# Patient Record
Sex: FEMALE | Race: WHITE | Employment: OTHER | URBAN - NONMETROPOLITAN AREA
[De-identification: names, ages, dates, MRNs, and addresses within clinical notes are randomized per-mention and may not be internally consistent; named-entity substitution may affect disease eponyms.]

---

## 2017-10-02 ENCOUNTER — DOCTOR'S OFFICE (OUTPATIENT)
Dept: URBAN - NONMETROPOLITAN AREA CLINIC 1 | Facility: CLINIC | Age: 77
Setting detail: OPHTHALMOLOGY
End: 2017-10-02

## 2017-10-02 ENCOUNTER — DOCTOR'S OFFICE (OUTPATIENT)
Dept: URBAN - NONMETROPOLITAN AREA CLINIC 1 | Facility: CLINIC | Age: 77
Setting detail: OPHTHALMOLOGY
End: 2017-10-02
Payer: COMMERCIAL

## 2017-10-02 ENCOUNTER — RX ONLY (RX ONLY)
Age: 77
End: 2017-10-02

## 2017-10-02 DIAGNOSIS — H52.4: ICD-10-CM

## 2017-10-02 DIAGNOSIS — H35.3221: ICD-10-CM

## 2017-10-02 DIAGNOSIS — H35.3112: ICD-10-CM

## 2017-10-02 DIAGNOSIS — H25.13: ICD-10-CM

## 2017-10-02 PROCEDURE — 99205 OFFICE O/P NEW HI 60 MIN: CPT | Performed by: OPHTHALMOLOGY

## 2017-10-02 PROCEDURE — VITAEYE VITA EYE VITAMINS: Performed by: OPHTHALMOLOGY

## 2017-10-02 PROCEDURE — 92134 CPTRZ OPH DX IMG PST SGM RTA: CPT | Performed by: OPHTHALMOLOGY

## 2017-10-02 ASSESSMENT — SPHEQUIV_DERIVED
OD_SPHEQUIV: 2.5
OS_SPHEQUIV: 0.5

## 2017-10-02 ASSESSMENT — REFRACTION_CURRENTRX
OD_AXIS: 146
OS_AXIS: 052
OD_OVR_VA: 20/
OD_VPRISM_DIRECTION: PROGS
OS_OVR_VA: 20/
OS_ADD: +2.50
OS_OVR_VA: 20/
OD_CYLINDER: -2.00
OD_OVR_VA: 20/
OS_VPRISM_DIRECTION: PROGS
OS_SPHERE: +2.00
OD_SPHERE: +2.25
OS_OVR_VA: 20/
OD_ADD: +2.50
OD_OVR_VA: 20/
OS_CYLINDER: -1.75

## 2017-10-02 ASSESSMENT — REFRACTION_AUTOREFRACTION
OD_AXIS: 118
OS_CYLINDER: -1.50
OD_CYLINDER: -2.50
OS_AXIS: 175
OS_SPHERE: +1.25
OD_SPHERE: +3.75

## 2017-10-02 ASSESSMENT — REFRACTION_MANIFEST
OS_VA1: 20/
OS_VA1: 20/
OU_VA: 20/
OD_VA3: 20/
OD_VA2: 20/
OU_VA: 20/
OD_VA2: 20/
OS_VA3: 20/
OS_VA2: 20/
OU_VA: 20/
OD_VA2: 20/
OD_VA1: 20/
OS_VA2: 20/
OS_VA3: 20/
OS_VA1: 20/
OS_VA3: 20/
OS_VA2: 20/
OD_VA3: 20/
OD_VA1: 20/
OD_VA1: 20/
OD_VA3: 20/

## 2017-10-02 ASSESSMENT — VISUAL ACUITY
OS_BCVA: 20/40+1
OD_BCVA: 20/70+1

## 2017-10-02 ASSESSMENT — CONFRONTATIONAL VISUAL FIELD TEST (CVF)
OD_FINDINGS: FULL
OS_FINDINGS: FULL

## 2017-10-09 ENCOUNTER — DOCTOR'S OFFICE (OUTPATIENT)
Dept: URBAN - NONMETROPOLITAN AREA CLINIC 1 | Facility: CLINIC | Age: 77
Setting detail: OPHTHALMOLOGY
End: 2017-10-09
Payer: COMMERCIAL

## 2017-10-09 DIAGNOSIS — H35.3232: ICD-10-CM

## 2017-10-09 DIAGNOSIS — H25.13: ICD-10-CM

## 2017-10-09 PROCEDURE — 92235 FLUORESCEIN ANGRPH MLTIFRAME: CPT | Performed by: OPHTHALMOLOGY

## 2017-10-09 PROCEDURE — 92250 FUNDUS PHOTOGRAPHY W/I&R: CPT | Performed by: OPHTHALMOLOGY

## 2017-10-09 PROCEDURE — 92014 COMPRE OPH EXAM EST PT 1/>: CPT | Performed by: OPHTHALMOLOGY

## 2017-10-09 ASSESSMENT — SPHEQUIV_DERIVED
OD_SPHEQUIV: 2.5
OS_SPHEQUIV: 0.5

## 2017-10-09 ASSESSMENT — CONFRONTATIONAL VISUAL FIELD TEST (CVF)
OS_FINDINGS: FULL
OD_FINDINGS: FULL

## 2017-10-09 ASSESSMENT — REFRACTION_CURRENTRX
OD_OVR_VA: 20/
OD_SPHERE: +2.25
OD_OVR_VA: 20/
OS_OVR_VA: 20/
OS_VPRISM_DIRECTION: PROGS
OD_ADD: +2.50
OD_OVR_VA: 20/
OS_CYLINDER: -1.75
OS_AXIS: 052
OS_OVR_VA: 20/
OS_SPHERE: +2.00
OD_VPRISM_DIRECTION: PROGS
OD_AXIS: 146
OD_CYLINDER: -2.00
OS_ADD: +2.50
OS_OVR_VA: 20/

## 2017-10-09 ASSESSMENT — REFRACTION_MANIFEST
OD_VA1: 20/
OD_VA2: 20/
OS_VA2: 20/
OS_VA3: 20/
OS_VA3: 20/
OU_VA: 20/
OD_VA2: 20/
OS_VA1: 20/
OS_VA1: 20/
OD_VA3: 20/
OU_VA: 20/
OS_VA2: 20/
OS_VA3: 20/
OD_VA1: 20/
OU_VA: 20/
OD_VA3: 20/
OD_VA3: 20/
OD_VA1: 20/
OS_VA2: 20/
OD_VA2: 20/
OS_VA1: 20/

## 2017-10-09 ASSESSMENT — VISUAL ACUITY
OS_BCVA: 20/20-1
OD_BCVA: 20/50+2

## 2017-10-09 ASSESSMENT — REFRACTION_AUTOREFRACTION
OS_CYLINDER: -1.50
OD_SPHERE: +3.75
OS_AXIS: 175
OD_AXIS: 118
OS_SPHERE: +1.25
OD_CYLINDER: -2.50

## 2017-11-17 ENCOUNTER — DOCTOR'S OFFICE (OUTPATIENT)
Dept: URBAN - NONMETROPOLITAN AREA CLINIC 1 | Facility: CLINIC | Age: 77
Setting detail: OPHTHALMOLOGY
End: 2017-11-17
Payer: COMMERCIAL

## 2017-11-17 DIAGNOSIS — H35.3112: ICD-10-CM

## 2017-11-17 DIAGNOSIS — H35.3222: ICD-10-CM

## 2017-11-17 DIAGNOSIS — H25.13: ICD-10-CM

## 2017-11-17 DIAGNOSIS — H43.813: ICD-10-CM

## 2017-11-17 DIAGNOSIS — H43.811: ICD-10-CM

## 2017-11-17 DIAGNOSIS — H43.812: ICD-10-CM

## 2017-11-17 PROCEDURE — 99214 OFFICE O/P EST MOD 30 MIN: CPT | Performed by: OPHTHALMOLOGY

## 2017-11-17 PROCEDURE — 92225 OPHTHALMOSCOPY EXTENDED INITIAL: CPT | Performed by: OPHTHALMOLOGY

## 2017-11-17 PROCEDURE — 92134 CPTRZ OPH DX IMG PST SGM RTA: CPT | Performed by: OPHTHALMOLOGY

## 2017-11-17 ASSESSMENT — REFRACTION_CURRENTRX
OS_OVR_VA: 20/
OD_VPRISM_DIRECTION: PROGS
OD_CYLINDER: -2.00
OS_CYLINDER: -1.75
OD_AXIS: 146
OS_VPRISM_DIRECTION: PROGS
OS_AXIS: 052
OS_OVR_VA: 20/
OS_OVR_VA: 20/
OS_SPHERE: +2.00
OD_OVR_VA: 20/
OD_OVR_VA: 20/
OD_SPHERE: +2.25
OD_OVR_VA: 20/
OS_ADD: +2.50
OD_ADD: +2.50

## 2017-11-17 ASSESSMENT — REFRACTION_MANIFEST
OD_VA1: 20/
OU_VA: 20/
OD_VA1: 20/
OD_VA3: 20/
OS_VA1: 20/
OD_VA3: 20/
OS_VA2: 20/
OS_VA3: 20/
OD_VA1: 20/
OS_VA1: 20/
OS_VA1: 20/
OS_VA3: 20/
OS_VA3: 20/
OD_VA2: 20/
OS_VA2: 20/
OD_VA3: 20/
OS_VA2: 20/
OD_VA2: 20/
OD_VA2: 20/

## 2017-11-17 ASSESSMENT — CONFRONTATIONAL VISUAL FIELD TEST (CVF)
OD_FINDINGS: FULL
OS_FINDINGS: FULL

## 2017-11-17 ASSESSMENT — REFRACTION_AUTOREFRACTION
OS_AXIS: 175
OS_CYLINDER: -1.50
OD_AXIS: 118
OS_SPHERE: +1.25
OD_CYLINDER: -2.50
OD_SPHERE: +3.75

## 2017-11-17 ASSESSMENT — VISUAL ACUITY
OS_BCVA: 20/30+2
OD_BCVA: 20/40

## 2017-11-17 ASSESSMENT — SPHEQUIV_DERIVED
OS_SPHEQUIV: 0.5
OD_SPHEQUIV: 2.5

## 2018-01-19 ENCOUNTER — DOCTOR'S OFFICE (OUTPATIENT)
Dept: URBAN - NONMETROPOLITAN AREA CLINIC 1 | Facility: CLINIC | Age: 78
Setting detail: OPHTHALMOLOGY
End: 2018-01-19

## 2018-01-19 DIAGNOSIS — H52.7: ICD-10-CM

## 2018-01-19 PROCEDURE — VITAEYE VITA EYE VITAMINS: Performed by: OPHTHALMOLOGY

## 2018-02-19 ENCOUNTER — DOCTOR'S OFFICE (OUTPATIENT)
Dept: URBAN - NONMETROPOLITAN AREA CLINIC 1 | Facility: CLINIC | Age: 78
Setting detail: OPHTHALMOLOGY
End: 2018-02-19
Payer: COMMERCIAL

## 2018-02-19 DIAGNOSIS — H35.3232: ICD-10-CM

## 2018-02-19 DIAGNOSIS — H04.122: ICD-10-CM

## 2018-02-19 DIAGNOSIS — H04.121: ICD-10-CM

## 2018-02-19 DIAGNOSIS — H04.123: ICD-10-CM

## 2018-02-19 DIAGNOSIS — H43.813: ICD-10-CM

## 2018-02-19 DIAGNOSIS — H43.812: ICD-10-CM

## 2018-02-19 DIAGNOSIS — H25.13: ICD-10-CM

## 2018-02-19 DIAGNOSIS — H43.811: ICD-10-CM

## 2018-02-19 PROCEDURE — 83861 MICROFLUID ANALY TEARS: CPT | Performed by: OPHTHALMOLOGY

## 2018-02-19 PROCEDURE — 92014 COMPRE OPH EXAM EST PT 1/>: CPT | Performed by: OPHTHALMOLOGY

## 2018-02-19 PROCEDURE — 92134 CPTRZ OPH DX IMG PST SGM RTA: CPT | Performed by: OPHTHALMOLOGY

## 2018-02-19 PROCEDURE — 92226 OPHTHALMOSCOPY EXT SUBSEQUENT: CPT | Performed by: OPHTHALMOLOGY

## 2018-02-19 ASSESSMENT — VISUAL ACUITY
OD_BCVA: 20/50+2
OS_BCVA: 20/30+2

## 2018-02-19 ASSESSMENT — REFRACTION_CURRENTRX
OD_OVR_VA: 20/
OD_SPHERE: +2.25
OS_OVR_VA: 20/
OS_OVR_VA: 20/
OD_ADD: +2.50
OD_AXIS: 146
OD_OVR_VA: 20/
OD_VPRISM_DIRECTION: PROGS
OS_AXIS: 052
OS_CYLINDER: -1.75
OD_OVR_VA: 20/
OS_SPHERE: +2.00
OS_ADD: +2.50
OD_CYLINDER: -2.00
OS_VPRISM_DIRECTION: PROGS
OS_OVR_VA: 20/

## 2018-02-19 ASSESSMENT — REFRACTION_AUTOREFRACTION
OD_CYLINDER: -2.50
OS_SPHERE: +1.25
OS_AXIS: 175
OD_SPHERE: +3.75
OD_AXIS: 118
OS_CYLINDER: -1.50

## 2018-02-19 ASSESSMENT — SPHEQUIV_DERIVED
OS_SPHEQUIV: 0.5
OD_SPHEQUIV: 2.5

## 2018-02-19 ASSESSMENT — REFRACTION_MANIFEST
OD_VA1: 20/
OU_VA: 20/
OS_VA1: 20/
OS_VA2: 20/
OS_VA1: 20/
OS_VA2: 20/
OU_VA: 20/
OD_VA3: 20/
OD_VA2: 20/
OS_VA2: 20/
OS_VA3: 20/
OU_VA: 20/
OS_VA3: 20/
OS_VA3: 20/
OD_VA1: 20/
OD_VA2: 20/
OS_VA1: 20/
OD_VA2: 20/
OD_VA1: 20/

## 2018-02-19 ASSESSMENT — CONFRONTATIONAL VISUAL FIELD TEST (CVF)
OS_FINDINGS: FULL
OD_FINDINGS: FULL

## 2018-04-23 ENCOUNTER — DOCTOR'S OFFICE (OUTPATIENT)
Dept: URBAN - NONMETROPOLITAN AREA CLINIC 1 | Facility: CLINIC | Age: 78
Setting detail: OPHTHALMOLOGY
End: 2018-04-23

## 2018-04-23 DIAGNOSIS — H52.7: ICD-10-CM

## 2018-04-23 PROCEDURE — VITAEYE VITA EYE VITAMINS: Performed by: OPHTHALMOLOGY

## 2018-06-21 ENCOUNTER — DOCTOR'S OFFICE (OUTPATIENT)
Dept: URBAN - NONMETROPOLITAN AREA CLINIC 1 | Facility: CLINIC | Age: 78
Setting detail: OPHTHALMOLOGY
End: 2018-06-21
Payer: COMMERCIAL

## 2018-06-21 DIAGNOSIS — H04.123: ICD-10-CM

## 2018-06-21 DIAGNOSIS — H35.3232: ICD-10-CM

## 2018-06-21 DIAGNOSIS — H04.121: ICD-10-CM

## 2018-06-21 DIAGNOSIS — H25.13: ICD-10-CM

## 2018-06-21 DIAGNOSIS — H43.812: ICD-10-CM

## 2018-06-21 DIAGNOSIS — H43.811: ICD-10-CM

## 2018-06-21 DIAGNOSIS — H04.122: ICD-10-CM

## 2018-06-21 DIAGNOSIS — H43.813: ICD-10-CM

## 2018-06-21 PROCEDURE — 92014 COMPRE OPH EXAM EST PT 1/>: CPT | Performed by: OPHTHALMOLOGY

## 2018-06-21 PROCEDURE — 83861 MICROFLUID ANALY TEARS: CPT | Performed by: OPHTHALMOLOGY

## 2018-06-21 PROCEDURE — 92226 OPHTHALMOSCOPY EXT SUBSEQUENT: CPT | Performed by: OPHTHALMOLOGY

## 2018-06-21 PROCEDURE — 92134 CPTRZ OPH DX IMG PST SGM RTA: CPT | Performed by: OPHTHALMOLOGY

## 2018-06-21 ASSESSMENT — REFRACTION_CURRENTRX
OS_ADD: +2.50
OS_AXIS: 052
OD_OVR_VA: 20/
OD_SPHERE: +2.25
OS_OVR_VA: 20/
OS_VPRISM_DIRECTION: PROGS
OS_CYLINDER: -1.75
OS_OVR_VA: 20/
OS_SPHERE: +2.00
OD_ADD: +2.50
OD_AXIS: 146
OD_OVR_VA: 20/
OD_OVR_VA: 20/
OS_OVR_VA: 20/
OD_VPRISM_DIRECTION: PROGS
OD_CYLINDER: -2.00

## 2018-06-21 ASSESSMENT — REFRACTION_MANIFEST
OU_VA: 20/
OD_VA3: 20/
OS_VA3: 20/
OU_VA: 20/
OD_VA2: 20/
OD_VA3: 20/
OD_VA1: 20/
OD_VA1: 20/
OS_VA1: 20/
OS_VA2: 20/
OS_VA1: 20/
OD_VA2: 20/
OD_VA1: 20/
OS_VA3: 20/
OS_VA2: 20/
OU_VA: 20/
OS_VA1: 20/
OS_VA3: 20/
OD_VA3: 20/
OD_VA2: 20/
OS_VA2: 20/

## 2018-06-21 ASSESSMENT — CONFRONTATIONAL VISUAL FIELD TEST (CVF)
OS_FINDINGS: FULL
OD_FINDINGS: FULL

## 2018-06-21 ASSESSMENT — REFRACTION_AUTOREFRACTION
OD_CYLINDER: -2.50
OS_AXIS: 175
OS_CYLINDER: -1.50
OD_SPHERE: +3.75
OD_AXIS: 118
OS_SPHERE: +1.25

## 2018-06-21 ASSESSMENT — SPHEQUIV_DERIVED
OD_SPHEQUIV: 2.5
OS_SPHEQUIV: 0.5

## 2018-06-21 ASSESSMENT — VISUAL ACUITY
OS_BCVA: 20/25-1
OD_BCVA: 20/50-2

## 2018-08-17 ENCOUNTER — DOCTOR'S OFFICE (OUTPATIENT)
Dept: URBAN - NONMETROPOLITAN AREA CLINIC 1 | Facility: CLINIC | Age: 78
Setting detail: OPHTHALMOLOGY
End: 2018-08-17
Payer: COMMERCIAL

## 2018-08-17 DIAGNOSIS — H04.121: ICD-10-CM

## 2018-08-17 DIAGNOSIS — H04.122: ICD-10-CM

## 2018-08-17 DIAGNOSIS — H35.3222: ICD-10-CM

## 2018-08-17 DIAGNOSIS — H35.3112: ICD-10-CM

## 2018-08-17 PROCEDURE — 83861 MICROFLUID ANALY TEARS: CPT | Performed by: OPHTHALMOLOGY

## 2018-08-17 PROCEDURE — 92014 COMPRE OPH EXAM EST PT 1/>: CPT | Performed by: OPHTHALMOLOGY

## 2018-08-17 PROCEDURE — 92235 FLUORESCEIN ANGRPH MLTIFRAME: CPT | Performed by: OPHTHALMOLOGY

## 2018-08-17 PROCEDURE — 92250 FUNDUS PHOTOGRAPHY W/I&R: CPT | Performed by: OPHTHALMOLOGY

## 2018-08-17 ASSESSMENT — CONFRONTATIONAL VISUAL FIELD TEST (CVF)
OD_FINDINGS: FULL
OS_FINDINGS: FULL

## 2018-08-17 ASSESSMENT — REFRACTION_MANIFEST
OD_VA3: 20/
OD_VA3: 20/
OD_VA2: 20/
OS_VA2: 20/
OU_VA: 20/
OS_VA3: 20/
OU_VA: 20/
OS_VA2: 20/
OD_VA1: 20/
OD_VA1: 20/
OD_VA3: 20/
OS_VA3: 20/
OS_VA3: 20/
OD_VA2: 20/
OS_VA1: 20/
OS_VA1: 20/
OS_VA2: 20/
OU_VA: 20/
OD_VA2: 20/
OD_VA1: 20/
OS_VA1: 20/

## 2018-08-17 ASSESSMENT — REFRACTION_CURRENTRX
OD_VPRISM_DIRECTION: PROGS
OS_CYLINDER: -1.75
OS_VPRISM_DIRECTION: PROGS
OS_AXIS: 052
OS_OVR_VA: 20/
OS_OVR_VA: 20/
OD_OVR_VA: 20/
OD_SPHERE: +2.25
OD_CYLINDER: -2.00
OD_AXIS: 146
OD_ADD: +2.50
OS_OVR_VA: 20/
OS_SPHERE: +2.00
OD_OVR_VA: 20/
OD_OVR_VA: 20/
OS_ADD: +2.50

## 2018-08-17 ASSESSMENT — REFRACTION_AUTOREFRACTION
OS_CYLINDER: -1.50
OD_SPHERE: +3.75
OS_AXIS: 175
OS_SPHERE: +1.25
OD_AXIS: 118
OD_CYLINDER: -2.50

## 2018-08-17 ASSESSMENT — SPHEQUIV_DERIVED
OS_SPHEQUIV: 0.5
OD_SPHEQUIV: 2.5

## 2018-08-17 ASSESSMENT — VISUAL ACUITY
OS_BCVA: 20/25-1
OD_BCVA: 20/30-2

## 2018-09-21 ENCOUNTER — DOCTOR'S OFFICE (OUTPATIENT)
Dept: URBAN - NONMETROPOLITAN AREA CLINIC 1 | Facility: CLINIC | Age: 78
Setting detail: OPHTHALMOLOGY
End: 2018-09-21
Payer: COMMERCIAL

## 2018-09-21 ENCOUNTER — RX ONLY (RX ONLY)
Age: 78
End: 2018-09-21

## 2018-09-21 DIAGNOSIS — H04.122: ICD-10-CM

## 2018-09-21 DIAGNOSIS — H35.3232: ICD-10-CM

## 2018-09-21 DIAGNOSIS — H04.123: ICD-10-CM

## 2018-09-21 DIAGNOSIS — H53.123: ICD-10-CM

## 2018-09-21 DIAGNOSIS — H43.811: ICD-10-CM

## 2018-09-21 DIAGNOSIS — H04.121: ICD-10-CM

## 2018-09-21 DIAGNOSIS — H43.813: ICD-10-CM

## 2018-09-21 PROCEDURE — 92226 OPHTHALMOSCOPY EXT SUBSEQUENT: CPT | Performed by: OPHTHALMOLOGY

## 2018-09-21 PROCEDURE — 83861 MICROFLUID ANALY TEARS: CPT | Performed by: OPHTHALMOLOGY

## 2018-09-21 PROCEDURE — 92134 CPTRZ OPH DX IMG PST SGM RTA: CPT | Performed by: OPHTHALMOLOGY

## 2018-09-21 PROCEDURE — 92014 COMPRE OPH EXAM EST PT 1/>: CPT | Performed by: OPHTHALMOLOGY

## 2018-09-21 ASSESSMENT — REFRACTION_CURRENTRX
OS_OVR_VA: 20/
OD_OVR_VA: 20/
OS_CYLINDER: -1.75
OS_VPRISM_DIRECTION: PROGS
OD_OVR_VA: 20/
OD_OVR_VA: 20/
OD_AXIS: 146
OS_OVR_VA: 20/
OD_SPHERE: +2.25
OS_SPHERE: +2.00
OD_VPRISM_DIRECTION: PROGS
OS_OVR_VA: 20/
OS_AXIS: 052
OD_ADD: +2.50
OD_CYLINDER: -2.00
OS_ADD: +2.50

## 2018-09-21 ASSESSMENT — REFRACTION_MANIFEST
OD_VA1: 20/
OD_VA1: 20/
OS_VA2: 20/
OD_VA3: 20/
OS_VA3: 20/
OD_VA3: 20/
OD_VA2: 20/
OS_VA3: 20/
OS_VA1: 20/
OS_VA1: 20/
OS_VA2: 20/
OD_VA2: 20/
OU_VA: 20/
OU_VA: 20/

## 2018-09-21 ASSESSMENT — VISUAL ACUITY
OS_BCVA: 20/30-2
OD_BCVA: 20/40-1

## 2018-09-21 ASSESSMENT — REFRACTION_AUTOREFRACTION
OS_AXIS: 175
OS_CYLINDER: -1.50
OD_CYLINDER: -2.50
OD_SPHERE: +3.75
OD_AXIS: 118
OS_SPHERE: +1.25

## 2018-09-21 ASSESSMENT — SPHEQUIV_DERIVED
OD_SPHEQUIV: 2.5
OS_SPHEQUIV: 0.5

## 2018-09-21 ASSESSMENT — CONFRONTATIONAL VISUAL FIELD TEST (CVF)
OD_FINDINGS: FULL
OS_FINDINGS: FULL

## 2018-10-22 ENCOUNTER — DOCTOR'S OFFICE (OUTPATIENT)
Dept: URBAN - NONMETROPOLITAN AREA CLINIC 1 | Facility: CLINIC | Age: 78
Setting detail: OPHTHALMOLOGY
End: 2018-10-22
Payer: COMMERCIAL

## 2018-10-22 ENCOUNTER — RX ONLY (RX ONLY)
Age: 78
End: 2018-10-22

## 2018-10-22 DIAGNOSIS — H35.3232: ICD-10-CM

## 2018-10-22 DIAGNOSIS — H04.122: ICD-10-CM

## 2018-10-22 DIAGNOSIS — H04.123: ICD-10-CM

## 2018-10-22 DIAGNOSIS — H04.121: ICD-10-CM

## 2018-10-22 DIAGNOSIS — H43.813: ICD-10-CM

## 2018-10-22 PROCEDURE — 92014 COMPRE OPH EXAM EST PT 1/>: CPT | Performed by: OPHTHALMOLOGY

## 2018-10-22 PROCEDURE — 92134 CPTRZ OPH DX IMG PST SGM RTA: CPT | Performed by: OPHTHALMOLOGY

## 2018-10-22 PROCEDURE — 83861 MICROFLUID ANALY TEARS: CPT | Performed by: OPHTHALMOLOGY

## 2018-10-22 ASSESSMENT — REFRACTION_MANIFEST
OU_VA: 20/
OD_VA3: 20/
OD_VA1: 20/
OS_VA2: 20/
OS_VA3: 20/
OS_VA1: 20/
OU_VA: 20/
OD_VA2: 20/
OS_VA3: 20/
OS_VA2: 20/
OD_VA2: 20/
OS_VA1: 20/
OD_VA1: 20/
OD_VA3: 20/

## 2018-10-22 ASSESSMENT — REFRACTION_AUTOREFRACTION
OS_SPHERE: +1.25
OD_AXIS: 118
OS_CYLINDER: -1.50
OD_CYLINDER: -2.50
OD_SPHERE: +3.75
OS_AXIS: 175

## 2018-10-22 ASSESSMENT — REFRACTION_CURRENTRX
OD_OVR_VA: 20/
OD_ADD: +2.50
OS_OVR_VA: 20/
OD_OVR_VA: 20/
OS_OVR_VA: 20/
OS_VPRISM_DIRECTION: PROGS
OD_AXIS: 146
OS_AXIS: 052
OD_VPRISM_DIRECTION: PROGS
OD_OVR_VA: 20/
OD_CYLINDER: -2.00
OS_SPHERE: +2.00
OS_CYLINDER: -1.75
OS_OVR_VA: 20/
OS_ADD: +2.50
OD_SPHERE: +2.25

## 2018-10-22 ASSESSMENT — SPHEQUIV_DERIVED
OS_SPHEQUIV: 0.5
OD_SPHEQUIV: 2.5

## 2018-10-22 ASSESSMENT — VISUAL ACUITY
OD_BCVA: 20/50-2
OS_BCVA: 20/25-1

## 2018-10-22 ASSESSMENT — CONFRONTATIONAL VISUAL FIELD TEST (CVF)
OS_FINDINGS: FULL
OD_FINDINGS: FULL

## 2018-11-07 ENCOUNTER — DOCTOR'S OFFICE (OUTPATIENT)
Dept: URBAN - NONMETROPOLITAN AREA CLINIC 1 | Facility: CLINIC | Age: 78
Setting detail: OPHTHALMOLOGY
End: 2018-11-07
Payer: COMMERCIAL

## 2018-11-07 DIAGNOSIS — H04.122: ICD-10-CM

## 2018-11-07 DIAGNOSIS — H04.121: ICD-10-CM

## 2018-11-07 PROCEDURE — 68761 CLOSE TEAR DUCT OPENING: CPT | Performed by: PHYSICIAN ASSISTANT

## 2018-11-07 ASSESSMENT — PUNCTA - ASSESSMENT
OD_PUNCTA: COL PLUG RLL RUL MED
OS_PUNCTA: COL PLUG LLL LUL MED

## 2018-11-08 ASSESSMENT — REFRACTION_CURRENTRX
OD_AXIS: 146
OS_OVR_VA: 20/
OS_VPRISM_DIRECTION: PROGS
OD_ADD: +2.50
OS_OVR_VA: 20/
OD_VPRISM_DIRECTION: PROGS
OD_OVR_VA: 20/
OS_SPHERE: +2.00
OD_OVR_VA: 20/
OS_AXIS: 052
OD_CYLINDER: -2.00
OS_ADD: +2.50
OS_OVR_VA: 20/
OD_OVR_VA: 20/
OD_SPHERE: +2.25
OS_CYLINDER: -1.75

## 2018-11-08 ASSESSMENT — SPHEQUIV_DERIVED
OS_SPHEQUIV: 0.5
OD_SPHEQUIV: 2.5

## 2018-11-08 ASSESSMENT — REFRACTION_AUTOREFRACTION
OD_AXIS: 118
OD_SPHERE: +3.75
OS_AXIS: 175
OS_CYLINDER: -1.50
OS_SPHERE: +1.25
OD_CYLINDER: -2.50

## 2018-11-08 ASSESSMENT — REFRACTION_MANIFEST
OD_VA1: 20/
OS_VA3: 20/
OD_VA2: 20/
OD_VA3: 20/
OD_VA2: 20/
OU_VA: 20/
OD_VA3: 20/
OU_VA: 20/
OS_VA1: 20/
OS_VA1: 20/
OS_VA2: 20/
OS_VA3: 20/
OD_VA1: 20/
OS_VA2: 20/

## 2018-11-08 ASSESSMENT — VISUAL ACUITY
OS_BCVA: 20/30
OD_BCVA: 20/50-2

## 2018-11-26 ENCOUNTER — DOCTOR'S OFFICE (OUTPATIENT)
Dept: URBAN - NONMETROPOLITAN AREA CLINIC 1 | Facility: CLINIC | Age: 78
Setting detail: OPHTHALMOLOGY
End: 2018-11-26
Payer: COMMERCIAL

## 2018-11-26 DIAGNOSIS — H43.813: ICD-10-CM

## 2018-11-26 DIAGNOSIS — H04.121: ICD-10-CM

## 2018-11-26 DIAGNOSIS — H25.13: ICD-10-CM

## 2018-11-26 DIAGNOSIS — H04.122: ICD-10-CM

## 2018-11-26 DIAGNOSIS — H35.3232: ICD-10-CM

## 2018-11-26 DIAGNOSIS — H04.123: ICD-10-CM

## 2018-11-26 PROCEDURE — 92014 COMPRE OPH EXAM EST PT 1/>: CPT | Performed by: OPHTHALMOLOGY

## 2018-11-26 PROCEDURE — 83861 MICROFLUID ANALY TEARS: CPT | Performed by: OPHTHALMOLOGY

## 2018-11-26 PROCEDURE — 92134 CPTRZ OPH DX IMG PST SGM RTA: CPT | Performed by: OPHTHALMOLOGY

## 2018-11-26 ASSESSMENT — REFRACTION_MANIFEST
OU_VA: 20/
OD_VA2: 20/
OS_VA1: 20/
OD_VA3: 20/
OS_VA2: 20/
OS_VA2: 20/
OD_VA3: 20/
OD_VA1: 20/
OD_VA1: 20/
OS_VA1: 20/
OS_VA3: 20/
OS_VA3: 20/
OU_VA: 20/
OD_VA2: 20/

## 2018-11-26 ASSESSMENT — REFRACTION_CURRENTRX
OS_SPHERE: +2.00
OS_VPRISM_DIRECTION: PROGS
OD_SPHERE: +2.25
OD_OVR_VA: 20/
OD_OVR_VA: 20/
OS_AXIS: 052
OS_CYLINDER: -1.75
OS_ADD: +2.50
OD_OVR_VA: 20/
OD_VPRISM_DIRECTION: PROGS
OD_CYLINDER: -2.00
OS_OVR_VA: 20/
OD_AXIS: 146
OS_OVR_VA: 20/
OS_OVR_VA: 20/
OD_ADD: +2.50

## 2018-11-26 ASSESSMENT — CONFRONTATIONAL VISUAL FIELD TEST (CVF)
OS_FINDINGS: FULL
OD_FINDINGS: FULL

## 2018-11-26 ASSESSMENT — VISUAL ACUITY
OS_BCVA: 20/30
OD_BCVA: 20/50-2

## 2018-11-26 ASSESSMENT — REFRACTION_AUTOREFRACTION
OD_SPHERE: +3.75
OD_AXIS: 118
OD_CYLINDER: -2.50
OS_CYLINDER: -1.50
OS_SPHERE: +1.25
OS_AXIS: 175

## 2018-11-26 ASSESSMENT — SPHEQUIV_DERIVED
OD_SPHEQUIV: 2.5
OS_SPHEQUIV: 0.5

## 2018-11-26 ASSESSMENT — LID EXAM ASSESSMENTS
OD_BLEPHARITIS: 1+ 2+
OS_BLEPHARITIS: 1+ 2+

## 2018-11-26 ASSESSMENT — TEAR BREAK UP TIME (TBUT)
OD_TBUT: 1+ 2+
OS_TBUT: 1+ 2+

## 2018-12-03 ENCOUNTER — DOCTOR'S OFFICE (OUTPATIENT)
Dept: URBAN - NONMETROPOLITAN AREA CLINIC 1 | Facility: CLINIC | Age: 78
Setting detail: OPHTHALMOLOGY
End: 2018-12-03
Payer: COMMERCIAL

## 2018-12-03 DIAGNOSIS — H01.002: ICD-10-CM

## 2018-12-03 DIAGNOSIS — H04.123: ICD-10-CM

## 2018-12-03 DIAGNOSIS — H01.004: ICD-10-CM

## 2018-12-03 DIAGNOSIS — H01.001: ICD-10-CM

## 2018-12-03 PROCEDURE — 99214 OFFICE O/P EST MOD 30 MIN: CPT | Performed by: PHYSICIAN ASSISTANT

## 2018-12-03 ASSESSMENT — DRY EYES - PHYSICIAN NOTES
OD_GENERALCOMMENTS: INFERIOR
OS_GENERALCOMMENTS: VERY TRACE INFERIOR SPK

## 2018-12-03 ASSESSMENT — TEAR BREAK UP TIME (TBUT)
OS_TBUT: 1+ 2+
OD_TBUT: 1+ 2+

## 2018-12-03 ASSESSMENT — LID EXAM ASSESSMENTS
OS_BLEPHARITIS: LLL LUL T
OD_BLEPHARITIS: RLL RUL T

## 2018-12-03 ASSESSMENT — SUPERFICIAL PUNCTATE KERATITIS (SPK): OD_SPK: T

## 2018-12-04 ASSESSMENT — REFRACTION_CURRENTRX
OS_ADD: +2.50
OD_OVR_VA: 20/
OS_OVR_VA: 20/
OD_OVR_VA: 20/
OS_SPHERE: +2.00
OD_OVR_VA: 20/
OS_OVR_VA: 20/
OS_VPRISM_DIRECTION: PROGS
OS_CYLINDER: -1.75
OS_AXIS: 052
OD_ADD: +2.50
OD_VPRISM_DIRECTION: PROGS
OD_CYLINDER: -2.00
OD_SPHERE: +2.25
OS_OVR_VA: 20/
OD_AXIS: 146

## 2018-12-04 ASSESSMENT — VISUAL ACUITY
OS_BCVA: 20/30
OD_BCVA: 20/50-2

## 2018-12-04 ASSESSMENT — REFRACTION_MANIFEST
OD_VA1: 20/
OS_VA1: 20/
OS_VA3: 20/
OD_VA3: 20/
OD_VA2: 20/
OU_VA: 20/
OD_VA2: 20/
OS_VA1: 20/
OD_VA3: 20/
OS_VA2: 20/
OS_VA3: 20/
OS_VA2: 20/
OD_VA1: 20/
OU_VA: 20/

## 2018-12-04 ASSESSMENT — REFRACTION_AUTOREFRACTION
OS_AXIS: 175
OD_AXIS: 118
OS_CYLINDER: -1.50
OS_SPHERE: +1.25
OD_CYLINDER: -2.50
OD_SPHERE: +3.75

## 2018-12-04 ASSESSMENT — SPHEQUIV_DERIVED
OD_SPHEQUIV: 2.5
OS_SPHEQUIV: 0.5

## 2019-01-28 ENCOUNTER — DOCTOR'S OFFICE (OUTPATIENT)
Dept: URBAN - NONMETROPOLITAN AREA CLINIC 1 | Facility: CLINIC | Age: 79
Setting detail: OPHTHALMOLOGY
End: 2019-01-28
Payer: COMMERCIAL

## 2019-01-28 DIAGNOSIS — H35.3112: ICD-10-CM

## 2019-01-28 DIAGNOSIS — H43.813: ICD-10-CM

## 2019-01-28 DIAGNOSIS — H04.121: ICD-10-CM

## 2019-01-28 DIAGNOSIS — H04.123: ICD-10-CM

## 2019-01-28 DIAGNOSIS — H04.122: ICD-10-CM

## 2019-01-28 DIAGNOSIS — H35.3222: ICD-10-CM

## 2019-01-28 PROCEDURE — 92134 CPTRZ OPH DX IMG PST SGM RTA: CPT | Performed by: OPHTHALMOLOGY

## 2019-01-28 PROCEDURE — 83861 MICROFLUID ANALY TEARS: CPT | Performed by: OPHTHALMOLOGY

## 2019-01-28 PROCEDURE — 92014 COMPRE OPH EXAM EST PT 1/>: CPT | Performed by: OPHTHALMOLOGY

## 2019-01-28 ASSESSMENT — REFRACTION_AUTOREFRACTION
OD_AXIS: 118
OD_CYLINDER: -2.50
OD_SPHERE: +3.75
OS_AXIS: 175
OS_CYLINDER: -1.50
OS_SPHERE: +1.25

## 2019-01-28 ASSESSMENT — SPHEQUIV_DERIVED
OS_SPHEQUIV: 0.5
OD_SPHEQUIV: 2.5

## 2019-01-28 ASSESSMENT — REFRACTION_CURRENTRX
OD_OVR_VA: 20/
OS_VPRISM_DIRECTION: PROGS
OS_ADD: +2.50
OS_OVR_VA: 20/
OS_AXIS: 052
OD_AXIS: 146
OD_CYLINDER: -2.00
OS_OVR_VA: 20/
OS_CYLINDER: -1.75
OS_SPHERE: +2.00
OD_OVR_VA: 20/
OD_VPRISM_DIRECTION: PROGS
OD_ADD: +2.50
OD_SPHERE: +2.25
OD_OVR_VA: 20/
OS_OVR_VA: 20/

## 2019-01-28 ASSESSMENT — REFRACTION_MANIFEST
OD_VA3: 20/
OS_VA1: 20/
OD_VA2: 20/
OD_VA1: 20/
OD_VA3: 20/
OS_VA2: 20/
OU_VA: 20/
OS_VA2: 20/
OD_VA2: 20/
OS_VA1: 20/
OD_VA1: 20/
OU_VA: 20/
OS_VA3: 20/
OS_VA3: 20/

## 2019-01-28 ASSESSMENT — LID EXAM ASSESSMENTS
OD_BLEPHARITIS: RLL RUL T
OS_BLEPHARITIS: LLL LUL T

## 2019-01-28 ASSESSMENT — DRY EYES - PHYSICIAN NOTES
OS_GENERALCOMMENTS: VERY TRACE INFERIOR SPK
OD_GENERALCOMMENTS: INFERIOR

## 2019-01-28 ASSESSMENT — TEAR BREAK UP TIME (TBUT)
OD_TBUT: 1+ 2+
OS_TBUT: 1+ 2+

## 2019-01-28 ASSESSMENT — CONFRONTATIONAL VISUAL FIELD TEST (CVF)
OS_FINDINGS: FULL
OD_FINDINGS: FULL

## 2019-01-28 ASSESSMENT — VISUAL ACUITY
OD_BCVA: 20/50-2
OS_BCVA: 20/30+2

## 2019-01-28 ASSESSMENT — SUPERFICIAL PUNCTATE KERATITIS (SPK): OD_SPK: T

## 2019-03-11 ENCOUNTER — DOCTOR'S OFFICE (OUTPATIENT)
Dept: URBAN - NONMETROPOLITAN AREA CLINIC 1 | Facility: CLINIC | Age: 79
Setting detail: OPHTHALMOLOGY
End: 2019-03-11
Payer: COMMERCIAL

## 2019-03-11 DIAGNOSIS — H25.13: ICD-10-CM

## 2019-03-11 DIAGNOSIS — H43.811: ICD-10-CM

## 2019-03-11 DIAGNOSIS — H04.123: ICD-10-CM

## 2019-03-11 DIAGNOSIS — H43.813: ICD-10-CM

## 2019-03-11 DIAGNOSIS — H04.122: ICD-10-CM

## 2019-03-11 DIAGNOSIS — H35.3232: ICD-10-CM

## 2019-03-11 DIAGNOSIS — H04.121: ICD-10-CM

## 2019-03-11 PROCEDURE — 92226 OPHTHALMOSCOPY EXT SUBSEQUENT: CPT | Performed by: OPHTHALMOLOGY

## 2019-03-11 PROCEDURE — 83861 MICROFLUID ANALY TEARS: CPT | Performed by: OPHTHALMOLOGY

## 2019-03-11 PROCEDURE — 92235 FLUORESCEIN ANGRPH MLTIFRAME: CPT | Performed by: OPHTHALMOLOGY

## 2019-03-11 PROCEDURE — 92250 FUNDUS PHOTOGRAPHY W/I&R: CPT | Performed by: OPHTHALMOLOGY

## 2019-03-11 PROCEDURE — 92014 COMPRE OPH EXAM EST PT 1/>: CPT | Performed by: OPHTHALMOLOGY

## 2019-03-11 ASSESSMENT — REFRACTION_CURRENTRX
OD_VPRISM_DIRECTION: PROGS
OD_OVR_VA: 20/
OD_SPHERE: +2.25
OS_AXIS: 052
OD_OVR_VA: 20/
OD_OVR_VA: 20/
OD_ADD: +2.50
OS_VPRISM_DIRECTION: PROGS
OS_OVR_VA: 20/
OS_ADD: +2.50
OS_OVR_VA: 20/
OD_CYLINDER: -2.00
OS_CYLINDER: -1.75
OD_AXIS: 146
OS_OVR_VA: 20/
OS_SPHERE: +2.00

## 2019-03-11 ASSESSMENT — REFRACTION_MANIFEST
OD_VA3: 20/
OD_VA2: 20/
OS_VA3: 20/
OD_VA3: 20/
OS_VA2: 20/
OS_VA2: 20/
OS_VA3: 20/
OU_VA: 20/
OS_VA1: 20/
OD_VA2: 20/
OS_VA1: 20/
OD_VA1: 20/
OD_VA1: 20/
OU_VA: 20/

## 2019-03-11 ASSESSMENT — CONFRONTATIONAL VISUAL FIELD TEST (CVF)
OD_FINDINGS: FULL
OS_FINDINGS: FULL

## 2019-03-11 ASSESSMENT — REFRACTION_AUTOREFRACTION
OS_SPHERE: +1.25
OS_CYLINDER: -1.50
OD_SPHERE: +3.75
OS_AXIS: 175
OD_AXIS: 118
OD_CYLINDER: -2.50

## 2019-03-11 ASSESSMENT — DRY EYES - PHYSICIAN NOTES
OD_GENERALCOMMENTS: INFERIOR
OS_GENERALCOMMENTS: VERY TRACE INFERIOR SPK

## 2019-03-11 ASSESSMENT — SUPERFICIAL PUNCTATE KERATITIS (SPK): OD_SPK: T

## 2019-03-11 ASSESSMENT — SPHEQUIV_DERIVED
OD_SPHEQUIV: 2.5
OS_SPHEQUIV: 0.5

## 2019-03-11 ASSESSMENT — LID EXAM ASSESSMENTS
OS_BLEPHARITIS: LLL LUL T
OD_BLEPHARITIS: RLL RUL T

## 2019-03-11 ASSESSMENT — VISUAL ACUITY
OD_BCVA: 20/40-2
OS_BCVA: 20/30-1

## 2019-03-11 ASSESSMENT — TEAR BREAK UP TIME (TBUT)
OD_TBUT: 1+ 2+
OS_TBUT: 1+ 2+

## 2019-04-15 ENCOUNTER — DOCTOR'S OFFICE (OUTPATIENT)
Dept: URBAN - NONMETROPOLITAN AREA CLINIC 1 | Facility: CLINIC | Age: 79
Setting detail: OPHTHALMOLOGY
End: 2019-04-15
Payer: COMMERCIAL

## 2019-04-15 ENCOUNTER — RX ONLY (RX ONLY)
Age: 79
End: 2019-04-15

## 2019-04-15 DIAGNOSIS — H35.3222: ICD-10-CM

## 2019-04-15 DIAGNOSIS — H43.811: ICD-10-CM

## 2019-04-15 DIAGNOSIS — H35.3112: ICD-10-CM

## 2019-04-15 DIAGNOSIS — H43.812: ICD-10-CM

## 2019-04-15 DIAGNOSIS — H04.122: ICD-10-CM

## 2019-04-15 DIAGNOSIS — H43.813: ICD-10-CM

## 2019-04-15 DIAGNOSIS — H25.13: ICD-10-CM

## 2019-04-15 DIAGNOSIS — H04.121: ICD-10-CM

## 2019-04-15 PROCEDURE — 92226 OPHTHALMOSCOPY EXT SUBSEQUENT: CPT | Performed by: OPHTHALMOLOGY

## 2019-04-15 PROCEDURE — 92134 CPTRZ OPH DX IMG PST SGM RTA: CPT | Performed by: OPHTHALMOLOGY

## 2019-04-15 PROCEDURE — 83861 MICROFLUID ANALY TEARS: CPT | Performed by: OPHTHALMOLOGY

## 2019-04-15 PROCEDURE — 92014 COMPRE OPH EXAM EST PT 1/>: CPT | Performed by: OPHTHALMOLOGY

## 2019-04-15 ASSESSMENT — REFRACTION_CURRENTRX
OS_CYLINDER: -1.75
OS_OVR_VA: 20/
OS_ADD: +2.50
OD_OVR_VA: 20/
OD_SPHERE: +2.25
OD_AXIS: 146
OS_AXIS: 052
OD_ADD: +2.50
OS_OVR_VA: 20/
OS_SPHERE: +2.00
OD_CYLINDER: -2.00
OD_VPRISM_DIRECTION: PROGS
OS_OVR_VA: 20/
OS_VPRISM_DIRECTION: PROGS

## 2019-04-15 ASSESSMENT — REFRACTION_MANIFEST
OD_VA2: 20/
OS_VA3: 20/
OD_VA1: 20/
OS_VA3: 20/
OS_VA1: 20/
OD_VA3: 20/
OS_VA1: 20/
OD_VA2: 20/
OU_VA: 20/
OS_VA2: 20/
OD_VA1: 20/
OD_VA3: 20/
OU_VA: 20/
OS_VA2: 20/

## 2019-04-15 ASSESSMENT — SPHEQUIV_DERIVED
OD_SPHEQUIV: 2.5
OS_SPHEQUIV: 0.5

## 2019-04-15 ASSESSMENT — VISUAL ACUITY
OS_BCVA: 20/25-2
OD_BCVA: 20/40-2

## 2019-04-15 ASSESSMENT — REFRACTION_AUTOREFRACTION
OD_AXIS: 118
OS_AXIS: 175
OD_CYLINDER: -2.50
OS_CYLINDER: -1.50
OS_SPHERE: +1.25
OD_SPHERE: +3.75

## 2019-04-15 ASSESSMENT — SUPERFICIAL PUNCTATE KERATITIS (SPK): OD_SPK: T

## 2019-04-15 ASSESSMENT — LID EXAM ASSESSMENTS
OD_BLEPHARITIS: RLL RUL T
OS_BLEPHARITIS: LLL LUL T

## 2019-04-15 ASSESSMENT — TEAR BREAK UP TIME (TBUT)
OS_TBUT: 1+ 2+
OD_TBUT: 1+ 2+

## 2019-04-15 ASSESSMENT — CONFRONTATIONAL VISUAL FIELD TEST (CVF)
OD_FINDINGS: FULL
OS_FINDINGS: FULL

## 2019-04-15 ASSESSMENT — DRY EYES - PHYSICIAN NOTES
OD_GENERALCOMMENTS: INFERIOR
OS_GENERALCOMMENTS: VERY TRACE INFERIOR SPK

## 2019-06-07 ENCOUNTER — DOCTOR'S OFFICE (OUTPATIENT)
Dept: URBAN - NONMETROPOLITAN AREA CLINIC 1 | Facility: CLINIC | Age: 79
Setting detail: OPHTHALMOLOGY
End: 2019-06-07
Payer: COMMERCIAL

## 2019-06-07 DIAGNOSIS — H04.121: ICD-10-CM

## 2019-06-07 DIAGNOSIS — H04.123: ICD-10-CM

## 2019-06-07 DIAGNOSIS — H35.3222: ICD-10-CM

## 2019-06-07 DIAGNOSIS — H43.811: ICD-10-CM

## 2019-06-07 DIAGNOSIS — H35.3112: ICD-10-CM

## 2019-06-07 DIAGNOSIS — H43.812: ICD-10-CM

## 2019-06-07 DIAGNOSIS — H04.122: ICD-10-CM

## 2019-06-07 DIAGNOSIS — H43.813: ICD-10-CM

## 2019-06-07 PROCEDURE — 83861 MICROFLUID ANALY TEARS: CPT | Performed by: OPHTHALMOLOGY

## 2019-06-07 PROCEDURE — 92134 CPTRZ OPH DX IMG PST SGM RTA: CPT | Performed by: OPHTHALMOLOGY

## 2019-06-07 PROCEDURE — 92226 OPHTHALMOSCOPY EXT SUBSEQUENT: CPT | Performed by: OPHTHALMOLOGY

## 2019-06-07 PROCEDURE — 92014 COMPRE OPH EXAM EST PT 1/>: CPT | Performed by: OPHTHALMOLOGY

## 2019-06-07 ASSESSMENT — REFRACTION_CURRENTRX
OS_VPRISM_DIRECTION: PROGS
OS_OVR_VA: 20/
OS_OVR_VA: 20/
OD_VPRISM_DIRECTION: PROGS
OD_ADD: +2.50
OD_OVR_VA: 20/
OS_SPHERE: +2.00
OD_OVR_VA: 20/
OD_CYLINDER: -2.00
OS_CYLINDER: -1.75
OS_OVR_VA: 20/
OS_ADD: +2.50
OD_SPHERE: +2.25
OD_AXIS: 146
OD_OVR_VA: 20/
OS_AXIS: 052

## 2019-06-07 ASSESSMENT — REFRACTION_MANIFEST
OS_VA2: 20/
OU_VA: 20/
OD_VA1: 20/
OD_VA2: 20/
OD_VA2: 20/
OD_VA3: 20/
OD_VA1: 20/
OS_VA3: 20/
OU_VA: 20/
OD_VA3: 20/
OS_VA2: 20/
OS_VA3: 20/
OS_VA1: 20/
OS_VA1: 20/

## 2019-06-07 ASSESSMENT — VISUAL ACUITY
OS_BCVA: 20/30-2
OD_BCVA: 20/50-2

## 2019-06-07 ASSESSMENT — REFRACTION_AUTOREFRACTION
OD_SPHERE: +3.75
OD_CYLINDER: -2.50
OS_AXIS: 175
OS_CYLINDER: -1.50
OD_AXIS: 118
OS_SPHERE: +1.25

## 2019-06-07 ASSESSMENT — CONFRONTATIONAL VISUAL FIELD TEST (CVF)
OS_FINDINGS: FULL
OD_FINDINGS: FULL

## 2019-06-07 ASSESSMENT — SPHEQUIV_DERIVED
OS_SPHEQUIV: 0.5
OD_SPHEQUIV: 2.5

## 2019-06-07 ASSESSMENT — LID EXAM ASSESSMENTS
OD_BLEPHARITIS: RLL RUL T
OS_BLEPHARITIS: LLL LUL T

## 2019-06-07 ASSESSMENT — DRY EYES - PHYSICIAN NOTES
OD_GENERALCOMMENTS: INFERIOR
OS_GENERALCOMMENTS: VERY TRACE INFERIOR SPK

## 2019-06-07 ASSESSMENT — SUPERFICIAL PUNCTATE KERATITIS (SPK): OD_SPK: T

## 2019-06-07 ASSESSMENT — TEAR BREAK UP TIME (TBUT)
OS_TBUT: 1+ 2+
OD_TBUT: 1+ 2+

## 2019-06-12 ENCOUNTER — DOCTOR'S OFFICE (OUTPATIENT)
Dept: URBAN - NONMETROPOLITAN AREA CLINIC 1 | Facility: CLINIC | Age: 79
Setting detail: OPHTHALMOLOGY
End: 2019-06-12
Payer: COMMERCIAL

## 2019-06-12 DIAGNOSIS — H04.121: ICD-10-CM

## 2019-06-12 DIAGNOSIS — H04.122: ICD-10-CM

## 2019-06-12 PROCEDURE — 68761 CLOSE TEAR DUCT OPENING: CPT | Performed by: PHYSICIAN ASSISTANT

## 2019-06-13 ASSESSMENT — REFRACTION_CURRENTRX
OS_SPHERE: +2.00
OS_AXIS: 052
OD_SPHERE: +2.25
OD_OVR_VA: 20/
OS_VPRISM_DIRECTION: PROGS
OD_ADD: +2.50
OD_OVR_VA: 20/
OS_OVR_VA: 20/
OD_OVR_VA: 20/
OS_ADD: +2.50
OD_AXIS: 146
OS_OVR_VA: 20/
OS_CYLINDER: -1.75
OD_CYLINDER: -2.00
OD_VPRISM_DIRECTION: PROGS
OS_OVR_VA: 20/

## 2019-06-13 ASSESSMENT — REFRACTION_MANIFEST
OU_VA: 20/
OS_VA1: 20/
OD_VA2: 20/
OS_VA1: 20/
OS_VA3: 20/
OD_VA3: 20/
OD_VA1: 20/
OS_VA2: 20/
OD_VA1: 20/
OD_VA3: 20/
OU_VA: 20/
OD_VA2: 20/
OS_VA2: 20/
OS_VA3: 20/

## 2019-06-13 ASSESSMENT — SPHEQUIV_DERIVED
OS_SPHEQUIV: 0.5
OD_SPHEQUIV: 2.5

## 2019-06-13 ASSESSMENT — REFRACTION_AUTOREFRACTION
OS_SPHERE: +1.25
OD_CYLINDER: -2.50
OD_SPHERE: +3.75
OS_AXIS: 175
OS_CYLINDER: -1.50
OD_AXIS: 118

## 2019-06-13 ASSESSMENT — VISUAL ACUITY
OS_BCVA: 20/30-2
OD_BCVA: 20/50-2

## 2019-07-29 ENCOUNTER — DOCTOR'S OFFICE (OUTPATIENT)
Dept: URBAN - NONMETROPOLITAN AREA CLINIC 1 | Facility: CLINIC | Age: 79
Setting detail: OPHTHALMOLOGY
End: 2019-07-29
Payer: COMMERCIAL

## 2019-07-29 DIAGNOSIS — H04.121: ICD-10-CM

## 2019-07-29 DIAGNOSIS — H43.813: ICD-10-CM

## 2019-07-29 DIAGNOSIS — H35.3222: ICD-10-CM

## 2019-07-29 DIAGNOSIS — H35.3112: ICD-10-CM

## 2019-07-29 DIAGNOSIS — H04.122: ICD-10-CM

## 2019-07-29 DIAGNOSIS — H35.052: ICD-10-CM

## 2019-07-29 PROCEDURE — 92134 CPTRZ OPH DX IMG PST SGM RTA: CPT | Performed by: OPHTHALMOLOGY

## 2019-07-29 PROCEDURE — 92014 COMPRE OPH EXAM EST PT 1/>: CPT | Performed by: OPHTHALMOLOGY

## 2019-07-29 PROCEDURE — 83861 MICROFLUID ANALY TEARS: CPT | Performed by: OPHTHALMOLOGY

## 2019-07-29 ASSESSMENT — SUPERFICIAL PUNCTATE KERATITIS (SPK): OD_SPK: T

## 2019-07-29 ASSESSMENT — CONFRONTATIONAL VISUAL FIELD TEST (CVF)
OS_FINDINGS: FULL
OD_FINDINGS: FULL

## 2019-07-29 ASSESSMENT — REFRACTION_MANIFEST
OU_VA: 20/
OS_VA1: 20/
OD_VA1: 20/
OS_VA3: 20/
OD_VA1: 20/
OS_VA3: 20/
OD_VA2: 20/
OS_VA2: 20/
OS_VA1: 20/
OD_VA3: 20/
OD_VA2: 20/
OD_VA3: 20/
OU_VA: 20/
OS_VA2: 20/

## 2019-07-29 ASSESSMENT — DRY EYES - PHYSICIAN NOTES
OD_GENERALCOMMENTS: INFERIOR
OS_GENERALCOMMENTS: VERY TRACE INFERIOR SPK

## 2019-07-29 ASSESSMENT — REFRACTION_CURRENTRX
OS_OVR_VA: 20/
OD_OVR_VA: 20/
OS_AXIS: 052
OD_CYLINDER: -2.00
OS_SPHERE: +2.00
OS_OVR_VA: 20/
OD_OVR_VA: 20/
OD_SPHERE: +2.25
OD_AXIS: 146
OS_CYLINDER: -1.75
OS_ADD: +2.50
OS_OVR_VA: 20/
OD_OVR_VA: 20/
OS_VPRISM_DIRECTION: PROGS
OD_ADD: +2.50
OD_VPRISM_DIRECTION: PROGS

## 2019-07-29 ASSESSMENT — LID EXAM ASSESSMENTS
OD_BLEPHARITIS: RLL RUL T
OS_BLEPHARITIS: LLL LUL T

## 2019-07-29 ASSESSMENT — REFRACTION_AUTOREFRACTION
OS_SPHERE: +1.25
OD_SPHERE: +3.75
OS_CYLINDER: -1.50
OD_AXIS: 118
OD_CYLINDER: -2.50
OS_AXIS: 175

## 2019-07-29 ASSESSMENT — VISUAL ACUITY
OD_BCVA: 20/40-2
OS_BCVA: 20/30-2

## 2019-07-29 ASSESSMENT — SPHEQUIV_DERIVED
OS_SPHEQUIV: 0.5
OD_SPHEQUIV: 2.5

## 2019-07-29 ASSESSMENT — TEAR BREAK UP TIME (TBUT)
OS_TBUT: 1+ 2+
OD_TBUT: 1+ 2+

## 2019-08-02 ENCOUNTER — DOCTOR'S OFFICE (OUTPATIENT)
Dept: URBAN - NONMETROPOLITAN AREA CLINIC 1 | Facility: CLINIC | Age: 79
Setting detail: OPHTHALMOLOGY
End: 2019-08-02
Payer: COMMERCIAL

## 2019-08-02 DIAGNOSIS — H04.121: ICD-10-CM

## 2019-08-02 DIAGNOSIS — H04.122: ICD-10-CM

## 2019-08-02 PROCEDURE — 68761 CLOSE TEAR DUCT OPENING: CPT | Performed by: OPHTHALMOLOGY

## 2019-08-02 ASSESSMENT — REFRACTION_MANIFEST
OU_VA: 20/
OD_VA1: 20/
OS_VA3: 20/
OS_VA3: 20/
OS_VA1: 20/
OS_VA2: 20/
OD_VA3: 20/
OD_VA3: 20/
OS_VA1: 20/
OU_VA: 20/
OS_VA2: 20/
OD_VA2: 20/
OD_VA1: 20/
OD_VA2: 20/

## 2019-08-02 ASSESSMENT — SPHEQUIV_DERIVED
OS_SPHEQUIV: 0.5
OD_SPHEQUIV: 2.5

## 2019-08-02 ASSESSMENT — REFRACTION_CURRENTRX
OS_CYLINDER: -1.75
OS_OVR_VA: 20/
OS_AXIS: 052
OS_OVR_VA: 20/
OS_ADD: +2.50
OD_AXIS: 146
OS_OVR_VA: 20/
OD_OVR_VA: 20/
OD_ADD: +2.50
OS_SPHERE: +2.00
OD_SPHERE: +2.25
OD_OVR_VA: 20/
OD_OVR_VA: 20/
OD_VPRISM_DIRECTION: PROGS
OS_VPRISM_DIRECTION: PROGS
OD_CYLINDER: -2.00

## 2019-08-02 ASSESSMENT — REFRACTION_AUTOREFRACTION
OS_CYLINDER: -1.50
OD_SPHERE: +3.75
OS_SPHERE: +1.25
OD_AXIS: 118
OD_CYLINDER: -2.50
OS_AXIS: 175

## 2019-08-02 ASSESSMENT — VISUAL ACUITY
OD_BCVA: 20/40-2
OS_BCVA: 20/30-2

## 2019-09-12 ENCOUNTER — DOCTOR'S OFFICE (OUTPATIENT)
Dept: URBAN - NONMETROPOLITAN AREA CLINIC 1 | Facility: CLINIC | Age: 79
Setting detail: OPHTHALMOLOGY
End: 2019-09-12
Payer: COMMERCIAL

## 2019-09-12 DIAGNOSIS — H43.813: ICD-10-CM

## 2019-09-12 DIAGNOSIS — H04.121: ICD-10-CM

## 2019-09-12 DIAGNOSIS — H43.811: ICD-10-CM

## 2019-09-12 DIAGNOSIS — H35.3112: ICD-10-CM

## 2019-09-12 DIAGNOSIS — H43.812: ICD-10-CM

## 2019-09-12 DIAGNOSIS — H04.122: ICD-10-CM

## 2019-09-12 DIAGNOSIS — H25.13: ICD-10-CM

## 2019-09-12 DIAGNOSIS — H35.3222: ICD-10-CM

## 2019-09-12 PROCEDURE — 83861 MICROFLUID ANALY TEARS: CPT | Performed by: OPHTHALMOLOGY

## 2019-09-12 PROCEDURE — 92014 COMPRE OPH EXAM EST PT 1/>: CPT | Performed by: OPHTHALMOLOGY

## 2019-09-12 PROCEDURE — 92226 OPHTHALMOSCOPY EXT SUBSEQUENT: CPT | Performed by: OPHTHALMOLOGY

## 2019-09-12 PROCEDURE — 92134 CPTRZ OPH DX IMG PST SGM RTA: CPT | Performed by: OPHTHALMOLOGY

## 2019-09-12 ASSESSMENT — LID EXAM ASSESSMENTS
OS_BLEPHARITIS: LLL LUL T
OD_BLEPHARITIS: RLL RUL T

## 2019-09-12 ASSESSMENT — CONFRONTATIONAL VISUAL FIELD TEST (CVF)
OS_FINDINGS: FULL
OD_FINDINGS: FULL

## 2019-09-12 ASSESSMENT — REFRACTION_CURRENTRX
OS_VPRISM_DIRECTION: PROGS
OS_ADD: +2.50
OD_ADD: +2.50
OD_OVR_VA: 20/
OS_OVR_VA: 20/
OD_AXIS: 146
OS_CYLINDER: -1.75
OS_OVR_VA: 20/
OD_VPRISM_DIRECTION: PROGS
OD_OVR_VA: 20/
OD_SPHERE: +2.25
OD_OVR_VA: 20/
OS_SPHERE: +2.00
OS_AXIS: 052
OS_OVR_VA: 20/
OD_CYLINDER: -2.00

## 2019-09-12 ASSESSMENT — REFRACTION_AUTOREFRACTION
OS_CYLINDER: -1.50
OS_SPHERE: +1.25
OD_SPHERE: +3.75
OD_CYLINDER: -2.50
OS_AXIS: 175
OD_AXIS: 118

## 2019-09-12 ASSESSMENT — SPHEQUIV_DERIVED
OD_SPHEQUIV: 2.5
OS_SPHEQUIV: 0.5

## 2019-09-12 ASSESSMENT — REFRACTION_MANIFEST
OD_VA1: 20/
OD_VA1: 20/
OS_VA1: 20/
OD_VA2: 20/
OS_VA2: 20/
OS_VA3: 20/
OS_VA1: 20/
OS_VA3: 20/
OD_VA3: 20/
OS_VA2: 20/
OD_VA3: 20/
OU_VA: 20/
OU_VA: 20/
OD_VA2: 20/

## 2019-09-12 ASSESSMENT — DRY EYES - PHYSICIAN NOTES
OS_GENERALCOMMENTS: VERY TRACE INFERIOR SPK
OD_GENERALCOMMENTS: INFERIOR

## 2019-09-12 ASSESSMENT — TEAR BREAK UP TIME (TBUT)
OS_TBUT: 1+ 2+
OD_TBUT: 1+ 2+

## 2019-09-12 ASSESSMENT — VISUAL ACUITY
OS_BCVA: 20/40+2
OD_BCVA: 20/40-2

## 2019-09-12 ASSESSMENT — SUPERFICIAL PUNCTATE KERATITIS (SPK): OD_SPK: T

## 2019-09-19 ENCOUNTER — DOCTOR'S OFFICE (OUTPATIENT)
Dept: URBAN - NONMETROPOLITAN AREA CLINIC 1 | Facility: CLINIC | Age: 79
Setting detail: OPHTHALMOLOGY
End: 2019-09-19
Payer: COMMERCIAL

## 2019-09-19 DIAGNOSIS — H04.123: ICD-10-CM

## 2019-09-19 PROCEDURE — 68761 CLOSE TEAR DUCT OPENING: CPT | Performed by: OPHTHALMOLOGY

## 2019-09-19 ASSESSMENT — REFRACTION_CURRENTRX
OS_AXIS: 052
OD_CYLINDER: -2.00
OS_OVR_VA: 20/
OD_OVR_VA: 20/
OD_VPRISM_DIRECTION: PROGS
OS_ADD: +2.50
OD_OVR_VA: 20/
OS_SPHERE: +2.00
OD_AXIS: 146
OS_CYLINDER: -1.75
OD_ADD: +2.50
OD_SPHERE: +2.25
OS_OVR_VA: 20/
OS_VPRISM_DIRECTION: PROGS
OD_OVR_VA: 20/
OS_OVR_VA: 20/

## 2019-09-19 ASSESSMENT — REFRACTION_MANIFEST
OS_VA3: 20/
OS_VA2: 20/
OD_VA2: 20/
OS_VA3: 20/
OD_VA3: 20/
OS_VA2: 20/
OD_VA1: 20/
OS_VA1: 20/
OS_VA1: 20/
OD_VA3: 20/
OU_VA: 20/
OU_VA: 20/
OD_VA2: 20/
OD_VA1: 20/

## 2019-09-19 ASSESSMENT — REFRACTION_AUTOREFRACTION
OD_CYLINDER: -2.50
OD_SPHERE: +3.75
OS_CYLINDER: -1.50
OS_AXIS: 175
OS_SPHERE: +1.25
OD_AXIS: 118

## 2019-09-19 ASSESSMENT — SPHEQUIV_DERIVED
OS_SPHEQUIV: 0.5
OD_SPHEQUIV: 2.5

## 2019-09-19 ASSESSMENT — VISUAL ACUITY
OS_BCVA: 20/40+2
OD_BCVA: 20/40-2

## 2019-10-14 ENCOUNTER — DOCTOR'S OFFICE (OUTPATIENT)
Dept: URBAN - NONMETROPOLITAN AREA CLINIC 1 | Facility: CLINIC | Age: 79
Setting detail: OPHTHALMOLOGY
End: 2019-10-14
Payer: COMMERCIAL

## 2019-10-14 DIAGNOSIS — H04.122: ICD-10-CM

## 2019-10-14 DIAGNOSIS — H43.813: ICD-10-CM

## 2019-10-14 DIAGNOSIS — H35.3112: ICD-10-CM

## 2019-10-14 DIAGNOSIS — H35.3222: ICD-10-CM

## 2019-10-14 DIAGNOSIS — H43.812: ICD-10-CM

## 2019-10-14 DIAGNOSIS — H43.811: ICD-10-CM

## 2019-10-14 DIAGNOSIS — H35.052: ICD-10-CM

## 2019-10-14 DIAGNOSIS — H04.121: ICD-10-CM

## 2019-10-14 PROCEDURE — 92014 COMPRE OPH EXAM EST PT 1/>: CPT | Performed by: OPHTHALMOLOGY

## 2019-10-14 PROCEDURE — 83861 MICROFLUID ANALY TEARS: CPT | Performed by: OPHTHALMOLOGY

## 2019-10-14 PROCEDURE — 92226 OPHTHALMOSCOPY EXT SUBSEQUENT: CPT | Performed by: OPHTHALMOLOGY

## 2019-10-14 PROCEDURE — 92134 CPTRZ OPH DX IMG PST SGM RTA: CPT | Performed by: OPHTHALMOLOGY

## 2019-10-14 ASSESSMENT — REFRACTION_CURRENTRX
OD_SPHERE: +2.25
OD_OVR_VA: 20/
OD_CYLINDER: -2.00
OD_AXIS: 146
OS_OVR_VA: 20/
OS_OVR_VA: 20/
OD_ADD: +2.50
OD_OVR_VA: 20/
OS_ADD: +2.50
OS_VPRISM_DIRECTION: PROGS
OD_OVR_VA: 20/
OD_VPRISM_DIRECTION: PROGS
OS_SPHERE: +2.00
OS_AXIS: 052
OS_OVR_VA: 20/
OS_CYLINDER: -1.75

## 2019-10-14 ASSESSMENT — REFRACTION_MANIFEST
OS_VA2: 20/
OS_VA3: 20/
OD_VA1: 20/
OD_VA3: 20/
OS_VA2: 20/
OS_VA1: 20/
OS_VA1: 20/
OD_VA2: 20/
OU_VA: 20/
OU_VA: 20/
OD_VA3: 20/
OD_VA1: 20/
OD_VA2: 20/
OS_VA3: 20/

## 2019-10-14 ASSESSMENT — REFRACTION_AUTOREFRACTION
OD_AXIS: 118
OD_CYLINDER: -2.50
OS_AXIS: 175
OD_SPHERE: +3.75
OS_SPHERE: +1.25
OS_CYLINDER: -1.50

## 2019-10-14 ASSESSMENT — LID EXAM ASSESSMENTS
OS_BLEPHARITIS: LLL LUL T
OD_BLEPHARITIS: RLL RUL T

## 2019-10-14 ASSESSMENT — DRY EYES - PHYSICIAN NOTES
OD_GENERALCOMMENTS: INFERIOR
OS_GENERALCOMMENTS: VERY TRACE INFERIOR SPK

## 2019-10-14 ASSESSMENT — TEAR BREAK UP TIME (TBUT)
OD_TBUT: 1+ 2+
OS_TBUT: 1+ 2+

## 2019-10-14 ASSESSMENT — VISUAL ACUITY
OD_BCVA: 20/50-2
OS_BCVA: 20/40-2

## 2019-10-14 ASSESSMENT — SPHEQUIV_DERIVED
OD_SPHEQUIV: 2.5
OS_SPHEQUIV: 0.5

## 2019-10-14 ASSESSMENT — CONFRONTATIONAL VISUAL FIELD TEST (CVF)
OD_FINDINGS: FULL
OS_FINDINGS: FULL

## 2019-10-14 ASSESSMENT — SUPERFICIAL PUNCTATE KERATITIS (SPK): OD_SPK: T

## 2020-01-20 ENCOUNTER — DOCTOR'S OFFICE (OUTPATIENT)
Dept: URBAN - NONMETROPOLITAN AREA CLINIC 1 | Facility: CLINIC | Age: 80
Setting detail: OPHTHALMOLOGY
End: 2020-01-20
Payer: COMMERCIAL

## 2020-01-20 DIAGNOSIS — S05.02XA: ICD-10-CM

## 2020-01-20 DIAGNOSIS — H35.3222: ICD-10-CM

## 2020-01-20 DIAGNOSIS — H43.813: ICD-10-CM

## 2020-01-20 DIAGNOSIS — H35.3112: ICD-10-CM

## 2020-01-20 DIAGNOSIS — H35.052: ICD-10-CM

## 2020-01-20 PROCEDURE — 92134 CPTRZ OPH DX IMG PST SGM RTA: CPT | Performed by: OPHTHALMOLOGY

## 2020-01-20 PROCEDURE — 92014 COMPRE OPH EXAM EST PT 1/>: CPT | Performed by: OPHTHALMOLOGY

## 2020-01-20 ASSESSMENT — REFRACTION_CURRENTRX
OS_ADD: +2.50
OD_SPHERE: +2.25
OD_ADD: +2.50
OD_OVR_VA: 20/
OD_CYLINDER: -2.00
OD_AXIS: 146
OS_OVR_VA: 20/
OS_SPHERE: +2.00
OS_VPRISM_DIRECTION: PROGS
OS_CYLINDER: -1.75
OD_VPRISM_DIRECTION: PROGS
OS_AXIS: 052

## 2020-01-20 ASSESSMENT — REFRACTION_AUTOREFRACTION
OD_CYLINDER: -2.50
OS_AXIS: 175
OD_AXIS: 118
OS_SPHERE: +1.25
OS_CYLINDER: -1.50
OD_SPHERE: +3.75

## 2020-01-20 ASSESSMENT — LID EXAM ASSESSMENTS
OS_BLEPHARITIS: LLL LUL T
OD_BLEPHARITIS: RLL RUL T

## 2020-01-20 ASSESSMENT — CONFRONTATIONAL VISUAL FIELD TEST (CVF)
OS_FINDINGS: FULL
OD_FINDINGS: FULL

## 2020-01-20 ASSESSMENT — TEAR BREAK UP TIME (TBUT)
OS_TBUT: 1+ 2+
OD_TBUT: 1+ 2+

## 2020-01-20 ASSESSMENT — VISUAL ACUITY
OS_BCVA: 20/40-1
OD_BCVA: 20/70

## 2020-01-20 ASSESSMENT — SUPERFICIAL PUNCTATE KERATITIS (SPK): OD_SPK: T

## 2020-01-20 ASSESSMENT — DRY EYES - PHYSICIAN NOTES
OD_GENERALCOMMENTS: INFERIOR
OS_GENERALCOMMENTS: VERY TRACE INFERIOR SPK

## 2020-01-20 ASSESSMENT — SPHEQUIV_DERIVED
OS_SPHEQUIV: 0.5
OD_SPHEQUIV: 2.5

## 2020-01-23 ENCOUNTER — DOCTOR'S OFFICE (OUTPATIENT)
Dept: URBAN - NONMETROPOLITAN AREA CLINIC 1 | Facility: CLINIC | Age: 80
Setting detail: OPHTHALMOLOGY
End: 2020-01-23
Payer: COMMERCIAL

## 2020-01-23 ENCOUNTER — RX ONLY (RX ONLY)
Age: 80
End: 2020-01-23

## 2020-01-23 DIAGNOSIS — H40.032: ICD-10-CM

## 2020-01-23 DIAGNOSIS — H04.121: ICD-10-CM

## 2020-01-23 DIAGNOSIS — H04.122: ICD-10-CM

## 2020-01-23 DIAGNOSIS — S05.02XA: ICD-10-CM

## 2020-01-23 PROCEDURE — 83861 MICROFLUID ANALY TEARS: CPT | Performed by: OPHTHALMOLOGY

## 2020-01-23 PROCEDURE — 92012 INTRM OPH EXAM EST PATIENT: CPT | Performed by: OPHTHALMOLOGY

## 2020-01-23 PROCEDURE — 92133 CPTRZD OPH DX IMG PST SGM ON: CPT | Performed by: OPHTHALMOLOGY

## 2020-01-23 ASSESSMENT — DRY EYES - PHYSICIAN NOTES
OS_GENERALCOMMENTS: VERY TRACE INFERIOR SPK
OD_GENERALCOMMENTS: INFERIOR

## 2020-01-23 ASSESSMENT — TEAR BREAK UP TIME (TBUT)
OD_TBUT: 1+ 2+
OS_TBUT: 1+ 2+

## 2020-01-23 ASSESSMENT — CONFRONTATIONAL VISUAL FIELD TEST (CVF)
OD_FINDINGS: FULL
OS_FINDINGS: FULL

## 2020-01-23 ASSESSMENT — LID EXAM ASSESSMENTS
OS_BLEPHARITIS: LLL LUL T
OD_BLEPHARITIS: RLL RUL T

## 2020-01-23 ASSESSMENT — SUPERFICIAL PUNCTATE KERATITIS (SPK): OD_SPK: T

## 2020-01-30 ENCOUNTER — DOCTOR'S OFFICE (OUTPATIENT)
Dept: URBAN - NONMETROPOLITAN AREA CLINIC 1 | Facility: CLINIC | Age: 80
Setting detail: OPHTHALMOLOGY
End: 2020-01-30
Payer: COMMERCIAL

## 2020-01-30 DIAGNOSIS — S05.02XA: ICD-10-CM

## 2020-01-30 PROCEDURE — 92014 COMPRE OPH EXAM EST PT 1/>: CPT | Performed by: OPHTHALMOLOGY

## 2020-01-30 ASSESSMENT — REFRACTION_AUTOREFRACTION
OD_AXIS: 118
OS_CYLINDER: -1.50
OD_CYLINDER: -2.50
OS_AXIS: 175
OS_CYLINDER: -1.50
OS_SPHERE: +1.25
OS_SPHERE: +1.25
OD_CYLINDER: -2.50
OS_AXIS: 175
OD_SPHERE: +3.75
OD_AXIS: 118
OD_SPHERE: +3.75

## 2020-01-30 ASSESSMENT — TEAR BREAK UP TIME (TBUT)
OD_TBUT: 1+ 2+
OS_TBUT: 1+ 2+

## 2020-01-30 ASSESSMENT — DRY EYES - PHYSICIAN NOTES
OS_GENERALCOMMENTS: VERY TRACE INFERIOR SPK
OD_GENERALCOMMENTS: INFERIOR

## 2020-01-30 ASSESSMENT — REFRACTION_CURRENTRX
OD_OVR_VA: 20/
OS_AXIS: 052
OD_OVR_VA: 20/
OD_ADD: +2.50
OD_VPRISM_DIRECTION: PROGS
OS_ADD: +2.50
OD_VPRISM_DIRECTION: PROGS
OS_AXIS: 052
OS_OVR_VA: 20/
OS_VPRISM_DIRECTION: PROGS
OS_OVR_VA: 20/
OS_SPHERE: +2.00
OD_AXIS: 146
OS_CYLINDER: -1.75
OD_ADD: +2.50
OS_SPHERE: +2.00
OD_AXIS: 146
OD_SPHERE: +2.25
OS_CYLINDER: -1.75
OD_SPHERE: +2.25
OS_VPRISM_DIRECTION: PROGS
OD_CYLINDER: -2.00
OD_CYLINDER: -2.00
OS_ADD: +2.50

## 2020-01-30 ASSESSMENT — VISUAL ACUITY
OS_BCVA: 20/30-2
OD_BCVA: 20/60+2
OD_BCVA: 20/60-2
OS_BCVA: 20/25-2

## 2020-01-30 ASSESSMENT — SUPERFICIAL PUNCTATE KERATITIS (SPK): OD_SPK: T

## 2020-01-30 ASSESSMENT — LID EXAM ASSESSMENTS
OS_BLEPHARITIS: LLL LUL T
OD_BLEPHARITIS: RLL RUL T

## 2020-01-30 ASSESSMENT — SPHEQUIV_DERIVED
OS_SPHEQUIV: 0.5
OD_SPHEQUIV: 2.5
OD_SPHEQUIV: 2.5
OS_SPHEQUIV: 0.5

## 2020-01-30 ASSESSMENT — CORNEAL TRAUMA - ABRASION: OS_ABRASION: ABSENT

## 2020-01-30 ASSESSMENT — CONFRONTATIONAL VISUAL FIELD TEST (CVF)
OS_FINDINGS: FULL
OD_FINDINGS: FULL

## 2020-02-05 ENCOUNTER — AMBUL SURGICAL CARE (OUTPATIENT)
Dept: URBAN - METROPOLITAN AREA SURGERY 29 | Facility: SURGERY | Age: 80
Setting detail: OPHTHALMOLOGY
End: 2020-02-05
Payer: COMMERCIAL

## 2020-02-05 DIAGNOSIS — H40.031: ICD-10-CM

## 2020-02-05 PROCEDURE — 66761 REVISION OF IRIS: CPT | Performed by: CLINIC/CENTER

## 2020-02-05 PROCEDURE — G8907 PT DOC NO EVENTS ON DISCHARG: HCPCS | Performed by: CLINIC/CENTER

## 2020-02-05 PROCEDURE — G8907 PT DOC NO EVENTS ON DISCHARG: HCPCS | Performed by: OPHTHALMOLOGY

## 2020-02-05 PROCEDURE — 66761 REVISION OF IRIS: CPT | Performed by: OPHTHALMOLOGY

## 2020-02-05 PROCEDURE — G8918 PT W/O PREOP ORDER IV AB PRO: HCPCS | Performed by: OPHTHALMOLOGY

## 2020-02-05 PROCEDURE — G8918 PT W/O PREOP ORDER IV AB PRO: HCPCS | Performed by: CLINIC/CENTER

## 2020-02-12 ENCOUNTER — AMBUL SURGICAL CARE (OUTPATIENT)
Dept: URBAN - METROPOLITAN AREA SURGERY 29 | Facility: SURGERY | Age: 80
Setting detail: OPHTHALMOLOGY
End: 2020-02-12
Payer: COMMERCIAL

## 2020-02-12 DIAGNOSIS — H40.032: ICD-10-CM

## 2020-02-12 PROCEDURE — 66761 REVISION OF IRIS: CPT | Performed by: CLINIC/CENTER

## 2020-02-12 PROCEDURE — 66761 REVISION OF IRIS: CPT | Performed by: OPHTHALMOLOGY

## 2020-02-12 PROCEDURE — G8918 PT W/O PREOP ORDER IV AB PRO: HCPCS | Performed by: CLINIC/CENTER

## 2020-02-12 PROCEDURE — G8918 PT W/O PREOP ORDER IV AB PRO: HCPCS | Performed by: OPHTHALMOLOGY

## 2020-02-12 PROCEDURE — G8907 PT DOC NO EVENTS ON DISCHARG: HCPCS | Performed by: OPHTHALMOLOGY

## 2020-02-12 PROCEDURE — G8907 PT DOC NO EVENTS ON DISCHARG: HCPCS | Performed by: CLINIC/CENTER

## 2020-02-20 ENCOUNTER — DOCTOR'S OFFICE (OUTPATIENT)
Dept: URBAN - NONMETROPOLITAN AREA CLINIC 1 | Facility: CLINIC | Age: 80
Setting detail: OPHTHALMOLOGY
End: 2020-02-20
Payer: COMMERCIAL

## 2020-02-20 VITALS — HEIGHT: 55 IN

## 2020-02-20 DIAGNOSIS — H25.12: ICD-10-CM

## 2020-02-20 DIAGNOSIS — H04.122: ICD-10-CM

## 2020-02-20 DIAGNOSIS — H35.3112: ICD-10-CM

## 2020-02-20 DIAGNOSIS — H35.052: ICD-10-CM

## 2020-02-20 DIAGNOSIS — H25.11: ICD-10-CM

## 2020-02-20 DIAGNOSIS — S05.02XA: ICD-10-CM

## 2020-02-20 DIAGNOSIS — H40.032: ICD-10-CM

## 2020-02-20 DIAGNOSIS — H04.121: ICD-10-CM

## 2020-02-20 DIAGNOSIS — H35.3222: ICD-10-CM

## 2020-02-20 DIAGNOSIS — H43.813: ICD-10-CM

## 2020-02-20 PROCEDURE — 83861 MICROFLUID ANALY TEARS: CPT | Performed by: OPHTHALMOLOGY

## 2020-02-20 PROCEDURE — 99024 POSTOP FOLLOW-UP VISIT: CPT | Performed by: OPHTHALMOLOGY

## 2020-02-20 PROCEDURE — 92134 CPTRZ OPH DX IMG PST SGM RTA: CPT | Performed by: OPHTHALMOLOGY

## 2020-02-20 PROCEDURE — 92201 OPSCPY EXTND RTA DRAW UNI/BI: CPT | Performed by: OPHTHALMOLOGY

## 2020-02-20 ASSESSMENT — REFRACTION_CURRENTRX
OD_SPHERE: +2.25
OD_ADD: +2.50
OD_AXIS: 146
OS_OVR_VA: 20/
OS_AXIS: 052
OD_VPRISM_DIRECTION: PROGS
OS_CYLINDER: -1.75
OD_OVR_VA: 20/
OS_ADD: +2.50
OD_CYLINDER: -2.00
OS_VPRISM_DIRECTION: PROGS
OS_SPHERE: +2.00

## 2020-02-20 ASSESSMENT — REFRACTION_AUTOREFRACTION
OS_SPHERE: +1.25
OS_AXIS: 175
OD_CYLINDER: -2.50
OD_SPHERE: +3.75
OS_CYLINDER: -1.50
OD_AXIS: 118

## 2020-02-20 ASSESSMENT — LID EXAM ASSESSMENTS
OS_BLEPHARITIS: LLL LUL T
OD_BLEPHARITIS: RLL RUL T

## 2020-02-20 ASSESSMENT — DRY EYES - PHYSICIAN NOTES
OS_GENERALCOMMENTS: VERY TRACE INFERIOR SPK
OD_GENERALCOMMENTS: INFERIOR

## 2020-02-20 ASSESSMENT — CONFRONTATIONAL VISUAL FIELD TEST (CVF)
OS_FINDINGS: FULL
OD_FINDINGS: FULL

## 2020-02-20 ASSESSMENT — VISUAL ACUITY
OS_BCVA: 20/30-1
OD_BCVA: 20/50-2

## 2020-02-20 ASSESSMENT — SPHEQUIV_DERIVED
OS_SPHEQUIV: 0.5
OD_SPHEQUIV: 2.5

## 2020-02-20 ASSESSMENT — TEAR BREAK UP TIME (TBUT)
OD_TBUT: 1+ 2+
OS_TBUT: 1+ 2+

## 2020-02-20 ASSESSMENT — CORNEAL TRAUMA - ABRASION: OS_ABRASION: ABSENT

## 2020-02-20 ASSESSMENT — SUPERFICIAL PUNCTATE KERATITIS (SPK): OD_SPK: T

## 2020-06-22 ENCOUNTER — DOCTOR'S OFFICE (OUTPATIENT)
Dept: URBAN - NONMETROPOLITAN AREA CLINIC 1 | Facility: CLINIC | Age: 80
Setting detail: OPHTHALMOLOGY
End: 2020-06-22
Payer: COMMERCIAL

## 2020-06-22 DIAGNOSIS — H35.3222: ICD-10-CM

## 2020-06-22 DIAGNOSIS — H25.11: ICD-10-CM

## 2020-06-22 DIAGNOSIS — H04.121: ICD-10-CM

## 2020-06-22 DIAGNOSIS — H35.3112: ICD-10-CM

## 2020-06-22 DIAGNOSIS — H35.052: ICD-10-CM

## 2020-06-22 DIAGNOSIS — H25.12: ICD-10-CM

## 2020-06-22 DIAGNOSIS — H43.813: ICD-10-CM

## 2020-06-22 DIAGNOSIS — H18.59: ICD-10-CM

## 2020-06-22 DIAGNOSIS — H04.122: ICD-10-CM

## 2020-06-22 PROCEDURE — 83861 MICROFLUID ANALY TEARS: CPT | Performed by: OPHTHALMOLOGY

## 2020-06-22 PROCEDURE — 92201 OPSCPY EXTND RTA DRAW UNI/BI: CPT | Performed by: OPHTHALMOLOGY

## 2020-06-22 PROCEDURE — 92134 CPTRZ OPH DX IMG PST SGM RTA: CPT | Performed by: OPHTHALMOLOGY

## 2020-06-22 PROCEDURE — 92014 COMPRE OPH EXAM EST PT 1/>: CPT | Performed by: OPHTHALMOLOGY

## 2020-06-22 ASSESSMENT — DRY EYES - PHYSICIAN NOTES
OD_GENERALCOMMENTS: INFERIOR
OS_GENERALCOMMENTS: VERY TRACE INFERIOR SPK

## 2020-06-22 ASSESSMENT — REFRACTION_CURRENTRX
OD_ADD: +2.50
OD_SPHERE: +2.25
OD_OVR_VA: 20/
OS_CYLINDER: -1.75
OS_OVR_VA: 20/
OD_CYLINDER: -2.00
OS_AXIS: 052
OS_VPRISM_DIRECTION: PROGS
OS_SPHERE: +2.00
OS_ADD: +2.50
OD_VPRISM_DIRECTION: PROGS
OD_AXIS: 146

## 2020-06-22 ASSESSMENT — SPHEQUIV_DERIVED
OD_SPHEQUIV: 2.5
OS_SPHEQUIV: 0.5

## 2020-06-22 ASSESSMENT — CONFRONTATIONAL VISUAL FIELD TEST (CVF)
OS_FINDINGS: FULL
OD_FINDINGS: FULL

## 2020-06-22 ASSESSMENT — REFRACTION_AUTOREFRACTION
OD_CYLINDER: -2.50
OS_AXIS: 175
OS_CYLINDER: -1.50
OS_SPHERE: +1.25
OD_SPHERE: +3.75
OD_AXIS: 118

## 2020-06-22 ASSESSMENT — LID EXAM ASSESSMENTS
OD_BLEPHARITIS: RLL RUL T
OS_BLEPHARITIS: LLL LUL T

## 2020-06-22 ASSESSMENT — TEAR BREAK UP TIME (TBUT)
OD_TBUT: 1+ 2+
OS_TBUT: 1+ 2+

## 2020-06-22 ASSESSMENT — SUPERFICIAL PUNCTATE KERATITIS (SPK): OD_SPK: T

## 2020-06-22 ASSESSMENT — VISUAL ACUITY
OD_BCVA: 20/50-2
OS_BCVA: 20/30-2

## 2020-06-22 ASSESSMENT — CORNEAL TRAUMA - ABRASION: OS_ABRASION: ABSENT

## 2020-07-20 ENCOUNTER — DOCTOR'S OFFICE (OUTPATIENT)
Dept: URBAN - NONMETROPOLITAN AREA CLINIC 1 | Facility: CLINIC | Age: 80
Setting detail: OPHTHALMOLOGY
End: 2020-07-20
Payer: COMMERCIAL

## 2020-07-20 VITALS — HEIGHT: 55 IN

## 2020-07-20 DIAGNOSIS — H43.813: ICD-10-CM

## 2020-07-20 DIAGNOSIS — H35.3112: ICD-10-CM

## 2020-07-20 DIAGNOSIS — H40.032: ICD-10-CM

## 2020-07-20 DIAGNOSIS — H04.121: ICD-10-CM

## 2020-07-20 DIAGNOSIS — H25.11: ICD-10-CM

## 2020-07-20 DIAGNOSIS — H35.3222: ICD-10-CM

## 2020-07-20 DIAGNOSIS — H04.122: ICD-10-CM

## 2020-07-20 DIAGNOSIS — H25.12: ICD-10-CM

## 2020-07-20 DIAGNOSIS — H18.59: ICD-10-CM

## 2020-07-20 DIAGNOSIS — H35.052: ICD-10-CM

## 2020-07-20 PROCEDURE — 92201 OPSCPY EXTND RTA DRAW UNI/BI: CPT | Performed by: OPHTHALMOLOGY

## 2020-07-20 PROCEDURE — 83861 MICROFLUID ANALY TEARS: CPT | Performed by: OPHTHALMOLOGY

## 2020-07-20 PROCEDURE — 92014 COMPRE OPH EXAM EST PT 1/>: CPT | Performed by: OPHTHALMOLOGY

## 2020-07-20 PROCEDURE — 92134 CPTRZ OPH DX IMG PST SGM RTA: CPT | Performed by: OPHTHALMOLOGY

## 2020-07-20 ASSESSMENT — CONFRONTATIONAL VISUAL FIELD TEST (CVF)
OS_FINDINGS: FULL
OD_FINDINGS: FULL

## 2020-07-20 ASSESSMENT — REFRACTION_CURRENTRX
OS_CYLINDER: -1.75
OD_AXIS: 146
OS_SPHERE: +2.00
OS_AXIS: 052
OD_ADD: +2.50
OD_CYLINDER: -2.00
OD_VPRISM_DIRECTION: PROGS
OS_OVR_VA: 20/
OD_OVR_VA: 20/
OS_VPRISM_DIRECTION: PROGS
OD_SPHERE: +2.25
OS_ADD: +2.50

## 2020-07-20 ASSESSMENT — DRY EYES - PHYSICIAN NOTES
OD_GENERALCOMMENTS: INFERIOR
OS_GENERALCOMMENTS: VERY TRACE INFERIOR SPK

## 2020-07-20 ASSESSMENT — REFRACTION_AUTOREFRACTION
OS_CYLINDER: -1.50
OD_CYLINDER: -2.50
OS_SPHERE: +1.25
OS_AXIS: 175
OD_SPHERE: +3.75
OD_AXIS: 118

## 2020-07-20 ASSESSMENT — SPHEQUIV_DERIVED
OD_SPHEQUIV: 2.5
OS_SPHEQUIV: 0.5

## 2020-07-20 ASSESSMENT — TEAR BREAK UP TIME (TBUT)
OD_TBUT: 1+ 2+
OS_TBUT: 1+ 2+

## 2020-07-20 ASSESSMENT — LID EXAM ASSESSMENTS
OD_BLEPHARITIS: RLL RUL T
OS_BLEPHARITIS: LLL LUL T

## 2020-07-20 ASSESSMENT — SUPERFICIAL PUNCTATE KERATITIS (SPK): OD_SPK: T

## 2020-07-20 ASSESSMENT — CORNEAL TRAUMA - ABRASION: OS_ABRASION: ABSENT

## 2020-07-20 ASSESSMENT — VISUAL ACUITY
OS_BCVA: 20/40-2
OD_BCVA: 20/50-2

## 2020-09-28 ENCOUNTER — DOCTOR'S OFFICE (OUTPATIENT)
Dept: URBAN - NONMETROPOLITAN AREA CLINIC 1 | Facility: CLINIC | Age: 80
Setting detail: OPHTHALMOLOGY
End: 2020-09-28
Payer: COMMERCIAL

## 2020-09-28 DIAGNOSIS — H35.052: ICD-10-CM

## 2020-09-28 DIAGNOSIS — H35.3112: ICD-10-CM

## 2020-09-28 DIAGNOSIS — H43.813: ICD-10-CM

## 2020-09-28 DIAGNOSIS — H04.122: ICD-10-CM

## 2020-09-28 DIAGNOSIS — H04.121: ICD-10-CM

## 2020-09-28 DIAGNOSIS — H18.59: ICD-10-CM

## 2020-09-28 DIAGNOSIS — H35.3222: ICD-10-CM

## 2020-09-28 PROCEDURE — 83861 MICROFLUID ANALY TEARS: CPT | Performed by: OPHTHALMOLOGY

## 2020-09-28 PROCEDURE — 92201 OPSCPY EXTND RTA DRAW UNI/BI: CPT | Performed by: OPHTHALMOLOGY

## 2020-09-28 PROCEDURE — 92134 CPTRZ OPH DX IMG PST SGM RTA: CPT | Performed by: OPHTHALMOLOGY

## 2020-09-28 PROCEDURE — 92014 COMPRE OPH EXAM EST PT 1/>: CPT | Performed by: OPHTHALMOLOGY

## 2020-09-28 ASSESSMENT — REFRACTION_AUTOREFRACTION
OS_SPHERE: +1.25
OD_SPHERE: +3.75
OS_AXIS: 175
OS_CYLINDER: -1.50
OD_AXIS: 118
OD_CYLINDER: -2.50

## 2020-09-28 ASSESSMENT — REFRACTION_CURRENTRX
OD_SPHERE: +2.25
OS_VPRISM_DIRECTION: PROGS
OS_OVR_VA: 20/
OD_AXIS: 146
OD_VPRISM_DIRECTION: PROGS
OS_AXIS: 052
OD_ADD: +2.50
OD_OVR_VA: 20/
OD_CYLINDER: -2.00
OS_SPHERE: +2.00
OS_CYLINDER: -1.75
OS_ADD: +2.50

## 2020-09-28 ASSESSMENT — DRY EYES - PHYSICIAN NOTES
OS_GENERALCOMMENTS: VERY TRACE INFERIOR SPK
OD_GENERALCOMMENTS: INFERIOR

## 2020-09-28 ASSESSMENT — TEAR BREAK UP TIME (TBUT)
OS_TBUT: 1+ 2+
OD_TBUT: 1+ 2+

## 2020-09-28 ASSESSMENT — VISUAL ACUITY
OS_BCVA: 20/30-2
OD_BCVA: 20/60-1

## 2020-09-28 ASSESSMENT — SPHEQUIV_DERIVED
OS_SPHEQUIV: 0.5
OD_SPHEQUIV: 2.5

## 2020-09-28 ASSESSMENT — CONFRONTATIONAL VISUAL FIELD TEST (CVF)
OS_FINDINGS: FULL
OD_FINDINGS: FULL

## 2020-09-28 ASSESSMENT — SUPERFICIAL PUNCTATE KERATITIS (SPK): OD_SPK: T

## 2020-09-28 ASSESSMENT — CORNEAL TRAUMA - ABRASION: OS_ABRASION: ABSENT

## 2020-09-28 ASSESSMENT — LID EXAM ASSESSMENTS
OD_BLEPHARITIS: RLL RUL T
OS_BLEPHARITIS: LLL LUL T

## 2020-09-29 ENCOUNTER — OFFICE VISIT (OUTPATIENT)
Dept: CARDIOLOGY CLINIC | Facility: CLINIC | Age: 80
End: 2020-09-29
Payer: MEDICARE

## 2020-09-29 VITALS
SYSTOLIC BLOOD PRESSURE: 160 MMHG | DIASTOLIC BLOOD PRESSURE: 82 MMHG | WEIGHT: 144 LBS | HEIGHT: 64 IN | HEART RATE: 64 BPM | TEMPERATURE: 97.6 F | BODY MASS INDEX: 24.59 KG/M2

## 2020-09-29 DIAGNOSIS — I48.91 ATRIAL FIBRILLATION, UNSPECIFIED TYPE (HCC): Primary | ICD-10-CM

## 2020-09-29 DIAGNOSIS — E78.2 MIXED HYPERLIPIDEMIA: ICD-10-CM

## 2020-09-29 DIAGNOSIS — I10 ESSENTIAL HYPERTENSION: ICD-10-CM

## 2020-09-29 PROCEDURE — 99204 OFFICE O/P NEW MOD 45 MIN: CPT | Performed by: INTERNAL MEDICINE

## 2020-09-29 RX ORDER — LOSARTAN POTASSIUM AND HYDROCHLOROTHIAZIDE 12.5; 5 MG/1; MG/1
1 TABLET ORAL
COMMUNITY
End: 2020-09-29 | Stop reason: ALTCHOICE

## 2020-09-29 RX ORDER — FAMOTIDINE 20 MG/1
20 TABLET, FILM COATED ORAL 2 TIMES DAILY
COMMUNITY
Start: 2020-09-12

## 2020-09-29 RX ORDER — ALPRAZOLAM 1 MG/1
1 TABLET ORAL DAILY PRN
COMMUNITY

## 2020-09-29 RX ORDER — VITAMIN E 268 MG
CAPSULE ORAL
COMMUNITY
End: 2020-09-29 | Stop reason: ALTCHOICE

## 2020-09-29 RX ORDER — MECLIZINE HYDROCHLORIDE 25 MG/1
TABLET ORAL
COMMUNITY

## 2020-09-29 RX ORDER — LOSARTAN POTASSIUM 50 MG/1
50 TABLET ORAL DAILY
COMMUNITY
Start: 2020-08-13

## 2020-09-29 RX ORDER — ROSUVASTATIN CALCIUM 10 MG/1
10 TABLET, COATED ORAL DAILY
COMMUNITY
Start: 2020-08-25 | End: 2022-01-17

## 2020-09-29 RX ORDER — DARIFENACIN HYDROBROMIDE 7.5 MG/1
TABLET, EXTENDED RELEASE ORAL
COMMUNITY
End: 2020-09-29 | Stop reason: ALTCHOICE

## 2020-09-29 RX ORDER — RANITIDINE 150 MG/1
TABLET ORAL
COMMUNITY
End: 2020-09-29 | Stop reason: CLARIF

## 2020-09-29 RX ORDER — OXYBUTYNIN CHLORIDE 5 MG/1
TABLET, EXTENDED RELEASE ORAL
COMMUNITY
End: 2020-09-29 | Stop reason: ALTCHOICE

## 2020-09-29 RX ORDER — HYDROCORTISONE 25 MG/G
CREAM TOPICAL
COMMUNITY
End: 2020-09-29 | Stop reason: ALTCHOICE

## 2020-09-29 RX ORDER — METOPROLOL SUCCINATE 25 MG/1
25 TABLET, EXTENDED RELEASE ORAL DAILY
COMMUNITY
Start: 2020-09-09 | End: 2020-11-03 | Stop reason: DRUGHIGH

## 2020-09-29 RX ORDER — TOLTERODINE 4 MG/1
CAPSULE, EXTENDED RELEASE ORAL
COMMUNITY
End: 2020-09-29 | Stop reason: ALTCHOICE

## 2020-09-29 RX ORDER — WARFARIN SODIUM 2 MG/1
2 TABLET ORAL DAILY
COMMUNITY
Start: 2020-09-09

## 2020-09-30 ENCOUNTER — CLINICAL SUPPORT (OUTPATIENT)
Dept: CARDIOLOGY CLINIC | Facility: CLINIC | Age: 80
End: 2020-09-30
Payer: MEDICARE

## 2020-09-30 DIAGNOSIS — I48.91 ATRIAL FIBRILLATION, UNSPECIFIED TYPE (HCC): ICD-10-CM

## 2020-09-30 PROCEDURE — 0296T PR EXT ECG > 48HR TO 21 DAY RCRD W/CONECT INTL RCRD: CPT | Performed by: INTERNAL MEDICINE

## 2020-09-30 PROCEDURE — 99211 OFF/OP EST MAY X REQ PHY/QHP: CPT

## 2020-09-30 NOTE — PROGRESS NOTES
The patient was seen in the office today for a zio patch to be applied  It was applied and registered

## 2020-10-03 PROCEDURE — 93000 ELECTROCARDIOGRAM COMPLETE: CPT | Performed by: INTERNAL MEDICINE

## 2020-10-05 ENCOUNTER — DOCTOR'S OFFICE (OUTPATIENT)
Dept: URBAN - NONMETROPOLITAN AREA CLINIC 1 | Facility: CLINIC | Age: 80
Setting detail: OPHTHALMOLOGY
End: 2020-10-05
Payer: COMMERCIAL

## 2020-10-05 ENCOUNTER — RX ONLY (RX ONLY)
Age: 80
End: 2020-10-05

## 2020-10-05 VITALS — HEIGHT: 55 IN

## 2020-10-05 DIAGNOSIS — H35.3222: ICD-10-CM

## 2020-10-05 DIAGNOSIS — H35.3112: ICD-10-CM

## 2020-10-05 DIAGNOSIS — H25.12: ICD-10-CM

## 2020-10-05 DIAGNOSIS — H25.11: ICD-10-CM

## 2020-10-05 DIAGNOSIS — H35.052: ICD-10-CM

## 2020-10-05 PROCEDURE — 92235 FLUORESCEIN ANGRPH MLTIFRAME: CPT | Performed by: OPHTHALMOLOGY

## 2020-10-05 PROCEDURE — 92250 FUNDUS PHOTOGRAPHY W/I&R: CPT | Performed by: OPHTHALMOLOGY

## 2020-10-05 PROCEDURE — 92014 COMPRE OPH EXAM EST PT 1/>: CPT | Performed by: OPHTHALMOLOGY

## 2020-10-05 ASSESSMENT — LID EXAM ASSESSMENTS
OD_BLEPHARITIS: RLL RUL T
OS_BLEPHARITIS: LLL LUL T

## 2020-10-05 ASSESSMENT — REFRACTION_CURRENTRX
OD_SPHERE: +2.25
OS_OVR_VA: 20/
OD_AXIS: 146
OS_AXIS: 052
OD_VPRISM_DIRECTION: PROGS
OD_OVR_VA: 20/
OS_CYLINDER: -1.75
OS_SPHERE: +2.00
OD_ADD: +2.50
OS_ADD: +2.50
OD_CYLINDER: -2.00
OS_VPRISM_DIRECTION: PROGS

## 2020-10-05 ASSESSMENT — REFRACTION_AUTOREFRACTION
OS_AXIS: 175
OS_CYLINDER: -1.50
OD_AXIS: 118
OD_SPHERE: +3.75
OD_CYLINDER: -2.50
OS_SPHERE: +1.25

## 2020-10-05 ASSESSMENT — CONFRONTATIONAL VISUAL FIELD TEST (CVF)
OD_FINDINGS: FULL
OS_FINDINGS: FULL

## 2020-10-05 ASSESSMENT — TEAR BREAK UP TIME (TBUT)
OS_TBUT: 1+ 2+
OD_TBUT: 1+ 2+

## 2020-10-05 ASSESSMENT — SPHEQUIV_DERIVED
OD_SPHEQUIV: 2.5
OS_SPHEQUIV: 0.5

## 2020-10-05 ASSESSMENT — VISUAL ACUITY
OD_BCVA: 20/50
OS_BCVA: 20/25-2

## 2020-10-05 ASSESSMENT — CORNEAL TRAUMA - ABRASION: OS_ABRASION: ABSENT

## 2020-10-05 ASSESSMENT — DRY EYES - PHYSICIAN NOTES
OS_GENERALCOMMENTS: VERY TRACE INFERIOR SPK
OD_GENERALCOMMENTS: INFERIOR

## 2020-10-05 ASSESSMENT — SUPERFICIAL PUNCTATE KERATITIS (SPK): OD_SPK: T

## 2020-10-26 ENCOUNTER — CLINICAL SUPPORT (OUTPATIENT)
Dept: CARDIOLOGY CLINIC | Facility: CLINIC | Age: 80
End: 2020-10-26
Payer: MEDICARE

## 2020-10-26 DIAGNOSIS — I48.91 ATRIAL FIBRILLATION, UNSPECIFIED TYPE (HCC): ICD-10-CM

## 2020-10-26 DIAGNOSIS — E78.2 MIXED HYPERLIPIDEMIA: ICD-10-CM

## 2020-10-26 DIAGNOSIS — I10 ESSENTIAL HYPERTENSION: ICD-10-CM

## 2020-10-26 PROCEDURE — 0298T PR EXT ECG > 48HR TO 21 DAY REVIEW AND INTERPRETATN: CPT

## 2020-10-27 ENCOUNTER — HOSPITAL ENCOUNTER (OUTPATIENT)
Dept: NON INVASIVE DIAGNOSTICS | Facility: HOSPITAL | Age: 80
Discharge: HOME | End: 2020-10-27
Attending: INTERNAL MEDICINE
Payer: MEDICARE

## 2020-10-27 ENCOUNTER — HOSPITAL ENCOUNTER (OUTPATIENT)
Dept: NUCLEAR MEDICINE | Facility: HOSPITAL | Age: 80
Discharge: HOME | End: 2020-10-27
Attending: INTERNAL MEDICINE
Payer: MEDICARE

## 2020-10-27 DIAGNOSIS — E78.2 MIXED HYPERLIPIDEMIA: ICD-10-CM

## 2020-10-27 DIAGNOSIS — I10 ESSENTIAL HYPERTENSION: ICD-10-CM

## 2020-10-27 DIAGNOSIS — I48.91 ATRIAL FIBRILLATION, UNSPECIFIED TYPE (HCC): ICD-10-CM

## 2020-10-27 PROCEDURE — 78452 HT MUSCLE IMAGE SPECT MULT: CPT | Performed by: INTERNAL MEDICINE

## 2020-10-27 PROCEDURE — 93306 TTE W/DOPPLER COMPLETE: CPT

## 2020-10-27 PROCEDURE — 93016 CV STRESS TEST SUPVJ ONLY: CPT | Performed by: INTERNAL MEDICINE

## 2020-10-27 PROCEDURE — 93017 CV STRESS TEST TRACING ONLY: CPT

## 2020-10-27 PROCEDURE — A9502 TC99M TETROFOSMIN: HCPCS

## 2020-10-27 PROCEDURE — 78452 HT MUSCLE IMAGE SPECT MULT: CPT

## 2020-10-27 PROCEDURE — 93306 TTE W/DOPPLER COMPLETE: CPT | Performed by: INTERNAL MEDICINE

## 2020-10-27 PROCEDURE — G1004 CDSM NDSC: HCPCS

## 2020-10-27 PROCEDURE — 93018 CV STRESS TEST I&R ONLY: CPT | Performed by: INTERNAL MEDICINE

## 2020-10-27 RX ADMIN — REGADENOSON 0.4 MG: 0.08 INJECTION, SOLUTION INTRAVENOUS at 09:20

## 2020-10-29 LAB
ARRHY DURING EX: NORMAL
CHEST PAIN STATEMENT: NORMAL
MAX DIASTOLIC BP: 82 MMHG
MAX HEART RATE: 117 BPM
MAX PREDICTED HEART RATE: 139 BPM
MAX. SYSTOLIC BP: 188 MMHG
PROTOCOL NAME: NORMAL
REASON FOR TERMINATION: NORMAL
TARGET HR FORMULA: NORMAL
TIME IN EXERCISE PHASE: NORMAL

## 2020-10-30 ENCOUNTER — TELEPHONE (OUTPATIENT)
Dept: CARDIOLOGY CLINIC | Facility: CLINIC | Age: 80
End: 2020-10-30

## 2020-11-02 ENCOUNTER — DOCTOR'S OFFICE (OUTPATIENT)
Dept: URBAN - NONMETROPOLITAN AREA CLINIC 1 | Facility: CLINIC | Age: 80
Setting detail: OPHTHALMOLOGY
End: 2020-11-02
Payer: COMMERCIAL

## 2020-11-02 DIAGNOSIS — H40.032: ICD-10-CM

## 2020-11-02 DIAGNOSIS — H35.3222: ICD-10-CM

## 2020-11-02 DIAGNOSIS — H25.12: ICD-10-CM

## 2020-11-02 DIAGNOSIS — H35.3112: ICD-10-CM

## 2020-11-02 DIAGNOSIS — H04.122: ICD-10-CM

## 2020-11-02 DIAGNOSIS — H43.813: ICD-10-CM

## 2020-11-02 DIAGNOSIS — H35.052: ICD-10-CM

## 2020-11-02 DIAGNOSIS — H25.11: ICD-10-CM

## 2020-11-02 DIAGNOSIS — H04.121: ICD-10-CM

## 2020-11-02 PROCEDURE — 92201 OPSCPY EXTND RTA DRAW UNI/BI: CPT | Performed by: OPHTHALMOLOGY

## 2020-11-02 PROCEDURE — 92014 COMPRE OPH EXAM EST PT 1/>: CPT | Performed by: OPHTHALMOLOGY

## 2020-11-02 PROCEDURE — 92134 CPTRZ OPH DX IMG PST SGM RTA: CPT | Performed by: OPHTHALMOLOGY

## 2020-11-02 ASSESSMENT — LID EXAM ASSESSMENTS
OD_BLEPHARITIS: RLL RUL T
OS_BLEPHARITIS: LLL LUL T

## 2020-11-02 ASSESSMENT — REFRACTION_CURRENTRX
OD_AXIS: 146
OS_ADD: +2.50
OD_SPHERE: +2.25
OS_OVR_VA: 20/
OD_CYLINDER: -2.00
OD_OVR_VA: 20/
OS_CYLINDER: -1.75
OS_SPHERE: +2.00
OS_AXIS: 052
OS_VPRISM_DIRECTION: PROGS
OD_ADD: +2.50
OD_VPRISM_DIRECTION: PROGS

## 2020-11-02 ASSESSMENT — SPHEQUIV_DERIVED
OD_SPHEQUIV: 2.5
OS_SPHEQUIV: 0.5

## 2020-11-02 ASSESSMENT — VISUAL ACUITY
OS_BCVA: 20/60-2
OD_BCVA: 20/70+2

## 2020-11-02 ASSESSMENT — REFRACTION_AUTOREFRACTION
OD_CYLINDER: -2.50
OD_AXIS: 118
OS_AXIS: 175
OD_SPHERE: +3.75
OS_SPHERE: +1.25
OS_CYLINDER: -1.50

## 2020-11-02 ASSESSMENT — CONFRONTATIONAL VISUAL FIELD TEST (CVF)
OD_FINDINGS: FULL
OS_FINDINGS: FULL

## 2020-11-02 ASSESSMENT — DRY EYES - PHYSICIAN NOTES
OD_GENERALCOMMENTS: INFERIOR
OS_GENERALCOMMENTS: VERY TRACE INFERIOR SPK

## 2020-11-02 ASSESSMENT — SUPERFICIAL PUNCTATE KERATITIS (SPK): OD_SPK: T

## 2020-11-02 ASSESSMENT — CORNEAL TRAUMA - ABRASION: OS_ABRASION: ABSENT

## 2020-11-02 ASSESSMENT — TEAR BREAK UP TIME (TBUT)
OD_TBUT: 1+ 2+
OS_TBUT: 1+ 2+

## 2020-11-03 ENCOUNTER — OFFICE VISIT (OUTPATIENT)
Dept: CARDIOLOGY CLINIC | Facility: CLINIC | Age: 80
End: 2020-11-03
Payer: MEDICARE

## 2020-11-03 VITALS
HEART RATE: 74 BPM | HEIGHT: 64 IN | WEIGHT: 144 LBS | OXYGEN SATURATION: 95 % | DIASTOLIC BLOOD PRESSURE: 90 MMHG | BODY MASS INDEX: 24.59 KG/M2 | SYSTOLIC BLOOD PRESSURE: 170 MMHG | TEMPERATURE: 94.1 F

## 2020-11-03 DIAGNOSIS — I48.0 PAROXYSMAL ATRIAL FIBRILLATION (HCC): Primary | ICD-10-CM

## 2020-11-03 DIAGNOSIS — I10 ESSENTIAL HYPERTENSION: ICD-10-CM

## 2020-11-03 DIAGNOSIS — E78.2 MIXED HYPERLIPIDEMIA: ICD-10-CM

## 2020-11-03 PROCEDURE — 99214 OFFICE O/P EST MOD 30 MIN: CPT | Performed by: INTERNAL MEDICINE

## 2020-11-03 RX ORDER — METOPROLOL SUCCINATE 25 MG/1
37.5 TABLET, EXTENDED RELEASE ORAL DAILY
Qty: 45 TABLET | Refills: 11 | Status: SHIPPED | OUTPATIENT
Start: 2020-11-03 | End: 2021-10-25

## 2020-11-06 ENCOUNTER — DOCTOR'S OFFICE (OUTPATIENT)
Dept: URBAN - NONMETROPOLITAN AREA CLINIC 1 | Facility: CLINIC | Age: 80
Setting detail: OPHTHALMOLOGY
End: 2020-11-06
Payer: COMMERCIAL

## 2020-11-06 DIAGNOSIS — H25.11: ICD-10-CM

## 2020-11-06 DIAGNOSIS — H35.3112: ICD-10-CM

## 2020-11-06 DIAGNOSIS — H35.3222: ICD-10-CM

## 2020-11-06 DIAGNOSIS — H35.052: ICD-10-CM

## 2020-11-06 DIAGNOSIS — H43.813: ICD-10-CM

## 2020-11-06 DIAGNOSIS — H25.12: ICD-10-CM

## 2020-11-06 PROCEDURE — 92014 COMPRE OPH EXAM EST PT 1/>: CPT | Performed by: OPHTHALMOLOGY

## 2020-11-06 PROCEDURE — 92240 ICG ANGIOGRAPHY I&R UNI/BI: CPT | Performed by: OPHTHALMOLOGY

## 2020-11-06 ASSESSMENT — DRY EYES - PHYSICIAN NOTES
OS_GENERALCOMMENTS: VERY TRACE INFERIOR SPK
OD_GENERALCOMMENTS: INFERIOR

## 2020-11-06 ASSESSMENT — REFRACTION_AUTOREFRACTION
OS_CYLINDER: -1.50
OD_SPHERE: +3.75
OS_SPHERE: +1.25
OS_AXIS: 175
OD_AXIS: 118
OD_CYLINDER: -2.50

## 2020-11-06 ASSESSMENT — REFRACTION_CURRENTRX
OD_AXIS: 146
OS_VPRISM_DIRECTION: PROGS
OD_VPRISM_DIRECTION: PROGS
OS_OVR_VA: 20/
OS_AXIS: 052
OS_ADD: +2.50
OD_OVR_VA: 20/
OD_SPHERE: +2.25
OS_SPHERE: +2.00
OD_ADD: +2.50
OS_CYLINDER: -1.75
OD_CYLINDER: -2.00

## 2020-11-06 ASSESSMENT — TEAR BREAK UP TIME (TBUT)
OS_TBUT: 1+ 2+
OD_TBUT: 1+ 2+

## 2020-11-06 ASSESSMENT — VISUAL ACUITY
OS_BCVA: 20/30-1
OD_BCVA: 20/60-2

## 2020-11-06 ASSESSMENT — CONFRONTATIONAL VISUAL FIELD TEST (CVF)
OS_FINDINGS: FULL
OD_FINDINGS: FULL

## 2020-11-06 ASSESSMENT — SPHEQUIV_DERIVED
OS_SPHEQUIV: 0.5
OD_SPHEQUIV: 2.5

## 2020-11-06 ASSESSMENT — SUPERFICIAL PUNCTATE KERATITIS (SPK): OD_SPK: T

## 2020-11-06 ASSESSMENT — LID EXAM ASSESSMENTS
OD_BLEPHARITIS: RLL RUL T
OS_BLEPHARITIS: LLL LUL T

## 2020-11-06 ASSESSMENT — CORNEAL TRAUMA - ABRASION: OS_ABRASION: ABSENT

## 2020-11-10 ENCOUNTER — TELEPHONE (OUTPATIENT)
Dept: CARDIOLOGY CLINIC | Facility: CLINIC | Age: 80
End: 2020-11-10

## 2020-12-04 ENCOUNTER — OFFICE VISIT (OUTPATIENT)
Dept: CARDIOLOGY CLINIC | Facility: CLINIC | Age: 80
End: 2020-12-04
Payer: MEDICARE

## 2020-12-04 VITALS
BODY MASS INDEX: 25.4 KG/M2 | HEART RATE: 78 BPM | DIASTOLIC BLOOD PRESSURE: 78 MMHG | TEMPERATURE: 97.8 F | SYSTOLIC BLOOD PRESSURE: 152 MMHG | WEIGHT: 148 LBS

## 2020-12-04 DIAGNOSIS — E78.2 MIXED HYPERLIPIDEMIA: ICD-10-CM

## 2020-12-04 DIAGNOSIS — I10 ESSENTIAL HYPERTENSION: ICD-10-CM

## 2020-12-04 DIAGNOSIS — I48.0 PAROXYSMAL ATRIAL FIBRILLATION (HCC): Primary | ICD-10-CM

## 2020-12-04 PROCEDURE — 99213 OFFICE O/P EST LOW 20 MIN: CPT | Performed by: INTERNAL MEDICINE

## 2020-12-06 ENCOUNTER — TELEPHONE (OUTPATIENT)
Dept: CARDIOLOGY CLINIC | Facility: CLINIC | Age: 80
End: 2020-12-06

## 2020-12-08 ENCOUNTER — CLINICAL SUPPORT (OUTPATIENT)
Dept: CARDIOLOGY CLINIC | Facility: CLINIC | Age: 80
End: 2020-12-08
Payer: MEDICARE

## 2020-12-08 DIAGNOSIS — I48.0 PAROXYSMAL ATRIAL FIBRILLATION (HCC): ICD-10-CM

## 2020-12-08 PROCEDURE — 0298T PR EXT ECG > 48HR TO 21 DAY REVIEW AND INTERPRETATN: CPT | Performed by: INTERNAL MEDICINE

## 2021-01-07 ENCOUNTER — TELEPHONE (OUTPATIENT)
Dept: CARDIOLOGY CLINIC | Facility: CLINIC | Age: 81
End: 2021-01-07

## 2021-01-07 NOTE — TELEPHONE ENCOUNTER
Pt calling stating that she was told to call in if she is having any issues with her legs  States that for the last three days her legs have felt like rubber  States that she is afraid to walk around and go up steps  States that she is also feeling shaky and doesn't feel good  States that other then that, there is no other symptoms  Denies SOB, or chest pain  Pt states that she did have a bloody nose on Monday but she did let her PCP know this

## 2021-01-08 NOTE — TELEPHONE ENCOUNTER
Pt states that she is now having dizziness, diarrhea, and nausea  Per Dr Jim Swanson, she is to go to the ER  Pt agrees to go to [address] [place] [address]  Going there now

## 2021-01-11 ENCOUNTER — CLINICAL SUPPORT (OUTPATIENT)
Dept: CARDIOLOGY CLINIC | Facility: CLINIC | Age: 81
End: 2021-01-11
Payer: MEDICARE

## 2021-01-11 VITALS
TEMPERATURE: 97.1 F | DIASTOLIC BLOOD PRESSURE: 88 MMHG | WEIGHT: 149 LBS | OXYGEN SATURATION: 98 % | HEART RATE: 65 BPM | SYSTOLIC BLOOD PRESSURE: 164 MMHG | BODY MASS INDEX: 25.58 KG/M2

## 2021-01-11 DIAGNOSIS — I48.0 PAROXYSMAL ATRIAL FIBRILLATION (HCC): ICD-10-CM

## 2021-01-11 DIAGNOSIS — I48.91 ATRIAL FIBRILLATION, UNSPECIFIED TYPE (HCC): Primary | ICD-10-CM

## 2021-01-11 PROCEDURE — RECHECK

## 2021-01-11 NOTE — PROGRESS NOTES
The patient is here today under the direction of Dr Rocco Tomas for an EKG  The pt called (see phone encounter) c/o leg weakness " rubber feeling"  The EKG was preformed and Dr Rocco Tomas was notified  Dr Rocco Tomas spoke with the patient and recommended that the patient holds her statin and begins taking vitamin D 1000IU daily and magnesium 250 mg daily  She also recommended that the patients asks her PCP to order labwork including vit d, folate, CBC, BMP, and Vit B12

## 2021-01-11 NOTE — TELEPHONE ENCOUNTER
After speaking with Dr Tri Cabrales it was recommended to ask th ept to come in for an EKG   She is acheduled  For 3PM today

## 2021-01-11 NOTE — TELEPHONE ENCOUNTER
The pt called and stated that she is still having the leg symptoms  She did not go to the ER as directed to on Friday by Dr Alicia Yates  She wanted to verify that Dr Alicia Yates knew about her " rubber feeling" legs  I assured her that she did and that was why she advised her to go to the ER  She states that her GI symptoms have subsided and her mos likely from being upset and anxious about her legs, As per her request I will send Dr Alicia Yates a message but I did inform the pt that Dr Alicia Yates will more than likely continue with her advice from Friday and direct her to the ER

## 2021-01-12 ENCOUNTER — TELEPHONE (OUTPATIENT)
Dept: CARDIOLOGY CLINIC | Facility: CLINIC | Age: 81
End: 2021-01-12

## 2021-01-12 PROCEDURE — 93000 ELECTROCARDIOGRAM COMPLETE: CPT

## 2021-01-20 DIAGNOSIS — Z23 ENCOUNTER FOR IMMUNIZATION: ICD-10-CM

## 2021-03-01 RX ORDER — MULTIVITAMIN WITH IRON
TABLET ORAL
COMMUNITY

## 2021-03-03 ENCOUNTER — TRANSCRIBE ORDERS (OUTPATIENT)
Dept: ADMINISTRATIVE | Facility: HOSPITAL | Age: 81
End: 2021-03-03

## 2021-03-03 ENCOUNTER — APPOINTMENT (OUTPATIENT)
Dept: LAB | Facility: HOSPITAL | Age: 81
End: 2021-03-03
Payer: MEDICARE

## 2021-03-03 DIAGNOSIS — G60.0 PERONEAL MUSCULAR ATROPHY: Primary | ICD-10-CM

## 2021-03-03 DIAGNOSIS — G62.9 PERIPHERAL POLYNEUROPATHY: ICD-10-CM

## 2021-03-03 DIAGNOSIS — G62.9 PERIPHERAL POLYNEUROPATHY: Primary | ICD-10-CM

## 2021-03-03 LAB
25(OH)D3 SERPL-MCNC: 36.5 NG/ML (ref 30–100)
ALBUMIN SERPL BCP-MCNC: 3.7 G/DL (ref 3.5–5)
ALP SERPL-CCNC: 81 U/L (ref 46–116)
ALT SERPL W P-5'-P-CCNC: 24 U/L (ref 12–78)
ANION GAP SERPL CALCULATED.3IONS-SCNC: 9 MMOL/L (ref 4–13)
AST SERPL W P-5'-P-CCNC: 18 U/L (ref 5–45)
BASOPHILS # BLD AUTO: 0.04 THOUSANDS/ΜL (ref 0–0.1)
BASOPHILS NFR BLD AUTO: 0 % (ref 0–1)
BILIRUB SERPL-MCNC: 0.57 MG/DL (ref 0.2–1)
BUN SERPL-MCNC: 28 MG/DL (ref 5–25)
CALCIUM SERPL-MCNC: 9.1 MG/DL (ref 8.3–10.1)
CHLORIDE SERPL-SCNC: 105 MMOL/L (ref 100–108)
CO2 SERPL-SCNC: 28 MMOL/L (ref 21–32)
CREAT SERPL-MCNC: 1.15 MG/DL (ref 0.6–1.3)
EOSINOPHIL # BLD AUTO: 0.08 THOUSAND/ΜL (ref 0–0.61)
EOSINOPHIL NFR BLD AUTO: 1 % (ref 0–6)
ERYTHROCYTE [DISTWIDTH] IN BLOOD BY AUTOMATED COUNT: 14 % (ref 11.6–15.1)
ERYTHROCYTE [SEDIMENTATION RATE] IN BLOOD: 27 MM/HOUR (ref 0–29)
GFR SERPL CREATININE-BSD FRML MDRD: 45 ML/MIN/1.73SQ M
GLUCOSE 2H P 75 G GLC PO SERPL-MCNC: 163 MG/DL (ref 65–139)
GLUCOSE P FAST SERPL-MCNC: 105 MG/DL (ref 65–99)
GLUCOSE P FAST SERPL-MCNC: 110 MG/DL (ref 65–99)
HCT VFR BLD AUTO: 42.3 % (ref 34.8–46.1)
HGB BLD-MCNC: 13.7 G/DL (ref 11.5–15.4)
IMM GRANULOCYTES # BLD AUTO: 0.05 THOUSAND/UL (ref 0–0.2)
IMM GRANULOCYTES NFR BLD AUTO: 1 % (ref 0–2)
LYMPHOCYTES # BLD AUTO: 3.35 THOUSANDS/ΜL (ref 0.6–4.47)
LYMPHOCYTES NFR BLD AUTO: 33 % (ref 14–44)
MCH RBC QN AUTO: 32.5 PG (ref 26.8–34.3)
MCHC RBC AUTO-ENTMCNC: 32.4 G/DL (ref 31.4–37.4)
MCV RBC AUTO: 101 FL (ref 82–98)
MONOCYTES # BLD AUTO: 0.82 THOUSAND/ΜL (ref 0.17–1.22)
MONOCYTES NFR BLD AUTO: 8 % (ref 4–12)
NEUTROPHILS # BLD AUTO: 5.96 THOUSANDS/ΜL (ref 1.85–7.62)
NEUTS SEG NFR BLD AUTO: 57 % (ref 43–75)
NRBC BLD AUTO-RTO: 0 /100 WBCS
PLATELET # BLD AUTO: 295 THOUSANDS/UL (ref 149–390)
PMV BLD AUTO: 10.3 FL (ref 8.9–12.7)
POTASSIUM SERPL-SCNC: 4.1 MMOL/L (ref 3.5–5.3)
PROT SERPL-MCNC: 7.7 G/DL (ref 6.4–8.2)
RBC # BLD AUTO: 4.21 MILLION/UL (ref 3.81–5.12)
SODIUM SERPL-SCNC: 142 MMOL/L (ref 136–145)
TSH SERPL DL<=0.05 MIU/L-ACNC: 2.2 UIU/ML (ref 0.36–3.74)
VIT B12 SERPL-MCNC: 329 PG/ML (ref 100–900)
WBC # BLD AUTO: 10.3 THOUSAND/UL (ref 4.31–10.16)

## 2021-03-03 PROCEDURE — 85652 RBC SED RATE AUTOMATED: CPT

## 2021-03-03 PROCEDURE — 82306 VITAMIN D 25 HYDROXY: CPT

## 2021-03-03 PROCEDURE — 84165 PROTEIN E-PHORESIS SERUM: CPT

## 2021-03-03 PROCEDURE — 82947 ASSAY GLUCOSE BLOOD QUANT: CPT

## 2021-03-03 PROCEDURE — 86038 ANTINUCLEAR ANTIBODIES: CPT

## 2021-03-03 PROCEDURE — 36415 COLL VENOUS BLD VENIPUNCTURE: CPT

## 2021-03-03 PROCEDURE — 84443 ASSAY THYROID STIM HORMONE: CPT

## 2021-03-03 PROCEDURE — 84165 PROTEIN E-PHORESIS SERUM: CPT | Performed by: PATHOLOGY

## 2021-03-03 PROCEDURE — 85025 COMPLETE CBC W/AUTO DIFF WBC: CPT

## 2021-03-03 PROCEDURE — 80053 COMPREHEN METABOLIC PANEL: CPT

## 2021-03-03 PROCEDURE — 82607 VITAMIN B-12: CPT

## 2021-03-03 PROCEDURE — 82950 GLUCOSE TEST: CPT

## 2021-03-04 ENCOUNTER — OFFICE VISIT (OUTPATIENT)
Dept: CARDIOLOGY CLINIC | Facility: CLINIC | Age: 81
End: 2021-03-04
Payer: MEDICARE

## 2021-03-04 ENCOUNTER — TRANSCRIBE ORDERS (OUTPATIENT)
Dept: ADMINISTRATIVE | Facility: HOSPITAL | Age: 81
End: 2021-03-04

## 2021-03-04 VITALS
HEART RATE: 70 BPM | DIASTOLIC BLOOD PRESSURE: 80 MMHG | OXYGEN SATURATION: 99 % | SYSTOLIC BLOOD PRESSURE: 160 MMHG | HEIGHT: 64 IN | BODY MASS INDEX: 24.75 KG/M2 | WEIGHT: 145 LBS

## 2021-03-04 DIAGNOSIS — M54.6 PAIN IN THORACIC SPINE: Primary | ICD-10-CM

## 2021-03-04 DIAGNOSIS — I10 ESSENTIAL HYPERTENSION: ICD-10-CM

## 2021-03-04 DIAGNOSIS — M35.3 POLYMYALGIA RHEUMATICA (HCC): ICD-10-CM

## 2021-03-04 DIAGNOSIS — E78.2 MIXED HYPERLIPIDEMIA: ICD-10-CM

## 2021-03-04 DIAGNOSIS — M47.812 SPONDYLOSIS WITHOUT MYELOPATHY OR RADICULOPATHY, CERVICAL REGION: ICD-10-CM

## 2021-03-04 DIAGNOSIS — I48.0 PAROXYSMAL ATRIAL FIBRILLATION (HCC): Primary | ICD-10-CM

## 2021-03-04 LAB
ALBUMIN SERPL ELPH-MCNC: 4.15 G/DL (ref 3.5–5)
ALBUMIN SERPL ELPH-MCNC: 61.1 % (ref 52–65)
ALPHA1 GLOB SERPL ELPH-MCNC: 0.25 G/DL (ref 0.1–0.4)
ALPHA1 GLOB SERPL ELPH-MCNC: 3.7 % (ref 2.5–5)
ALPHA2 GLOB SERPL ELPH-MCNC: 0.9 G/DL (ref 0.4–1.2)
ALPHA2 GLOB SERPL ELPH-MCNC: 13.2 % (ref 7–13)
BETA GLOB ABNORMAL SERPL ELPH-MCNC: 0.4 G/DL (ref 0.4–0.8)
BETA1 GLOB SERPL ELPH-MCNC: 5.9 % (ref 5–13)
BETA2 GLOB SERPL ELPH-MCNC: 5.1 % (ref 2–8)
BETA2+GAMMA GLOB SERPL ELPH-MCNC: 0.35 G/DL (ref 0.2–0.5)
GAMMA GLOB ABNORMAL SERPL ELPH-MCNC: 0.75 G/DL (ref 0.5–1.6)
GAMMA GLOB SERPL ELPH-MCNC: 11 % (ref 12–22)
IGG/ALB SER: 1.57 {RATIO} (ref 1.1–1.8)
PROT PATTERN SERPL ELPH-IMP: ABNORMAL
PROT SERPL-MCNC: 6.8 G/DL (ref 6.4–8.2)

## 2021-03-04 PROCEDURE — 99213 OFFICE O/P EST LOW 20 MIN: CPT | Performed by: INTERNAL MEDICINE

## 2021-03-04 NOTE — PROGRESS NOTES
Cardiology Office Visit    Elyssa Kaufman  [id number]  1940    [facility] [address]  [facility] [address] [phone]      Dear Grayson Haque DO,    I had the pleasure of seeing your patient at our [address] [facility] [place] office today 3/4/2021  As you know she is a pleasant  female with a medical history as described below  Reason for office visit: Follow up atrial fibrillation, hypertension and hyperlipidemia  1  Paroxysmal atrial fibrillation [facility])  Assessment & Plan:  Newly diagnosed atrial fibrillation 9/9/2020  ECG quality not great but appeared to be atrial fibrillation  Patient was started on anticoagulation with coumadin  CHADsVASc score  4  INR goal 2-3 (managed by PCP)  Discussed possible transition to [address] such as eliquis/pradaxa/xarelto  She is concerned about cost    Will need to check cost at pharmacy to see if feasible  Has had a few episodes of epistaxis on coumadin  Continue metoprolol succinate 37 5 mg daily  She denies any significant palpitations since her last office visit  ECG 9/29/2020: Normal sinus rhythm  HR 67 bpm   Echocardiogram 10/27/2020 was unremarkable  Nuclear lexiscan stress test 10/27/2020 was normal   We discussed that if she has onset of palpitations she can take an extra 1/2 to 1 tablets of metoprolol succinate  14 day ZIO (9/30-10/14/2020) showed no atrial fibrillation but did show 79 episodes of SVT with the longest lasting 19 2 seconds as well as frequent PAC's (6 8%)  ? Atrial tachycardia on some strips  TSH was normal as was magnesium  We discussed adding magnesium 250 mg daily if she has recurrent palpitations (may be a good idea even if no palps)  Will need to screen for CONNOR  2  Essential hypertension  Assessment & Plan:  Blood pressure remains elevated on exam   160/80 on my personal recheck  Blood pressures at home are well controlled     We will continue to monitor for now  If blood pressure remains elevated likely will need to uptitrate losartan  3  Mixed hyperlipidemia  Assessment & Plan:  Lipid panel 9/9/2020: C 191  T 198  H 63  L 92  Patient was on rosuvastatin 10 mg daily but stopped it when her legs started to feel 'rubbery'/weak  Repeat lipid panel 9/2021  I would like her to go back on statin therapy  HPI     Patient has history of atrial fibrillation, hyperlipidemia, hypertension, depression, diverticular disease, polyarthropathy and GERD     9/29/2020: (Initial office evaluation): Patient is very active and appears younger than stated age  She tells me that in mid august she started to notice shortness of breath and tightness in her chest especially while climbing her 16 steps into her apartment  She also felt her heart racing/fast  She was seen by her PCP at which time an ECG showed atrial fibrillation with a controlled heart rate  She was started on coumadin and metoprolol  She tells me that she developed epistaxis on coumadin and her dose was decreased  She tells me that since starting medications she has otherwise been feeling well  She did have one episode of palpitations while in [place] which lasted about 40 minutes then resolved  She does describe occasional chest tightness but upon further questioning this is more of a chest wall tenderness  Prior normal cardiac catheterization in 1997 after abnormal stress test  Occasional reflux symptoms  Two cups of coffee in am and was previously drinking a lot of green tea which she has since stopped  Occasional vertigo  ECG today NSR  TSH 9/9/2020 normal  Mg 2 2 mg/dL  Other labs below  11/3/2020:  Patient presents to the office today for follow-up  She tells me that she was seen by her optometrist in needs cataract surgery  She has had 2 episodes of epistaxis since her last office visit  She denies any overt chest pain    She denies any lightheadedness or dizziness  She has had palpitations intermittently  Patient did undergo ZIO monitor which showed 79 episodes of SVT with the longest lasting 19 2 seconds  Some of these episodes were noted to be possible atrial tachycardia with variable block  Frequent PACs (6 8%) and rare PVCs (< 1%)  I had also recommended echocardiogram and nuclear stress test   Echocardiogram 10/27/2020 was unremarkable  Hood Velazquez nuclear stress test 10/27/2020 showed no evidence of scar or ischemia  12/4/2020:  Patient returns to the office today for follow-up  I had increased her metoprolol succinate from 25 mg daily to 37 5 mg at her last office visit  She tells me that since that time she has been feeling great  She has had no again palpitations since the increase  She denies any chest pain  She denies any shortness of breath  She appears to be doing very well  She continues to have issues with her Coumadin levels but is resistant to change to one of the DOAC's due to cost      3/4/2021:  Patient presents to the office today for follow-up  She tells me that she is seeing Neurology needs to undergo an EMG  He is also followed up with Rheumatology Dr Julito Robles and needs to undergo an MRI  She reports that her legs feel rubbery  She tells me that on January 7th she woke up and her legs felt extremely rubbery at which time she stopped her statin  She did prednisone for a few days  She did do a trial off of her meds both metoprolol and losartan  She describes feeling pins and needles in her fingertips  Her legs overall feel weak  She denies any palpitations  Blood pressures have been well controlled at home        Patient Active Problem List   Diagnosis    [facility]    Essential hypertension    Mixed hyperlipidemia    Chest discomfort    Leg pain     Past Medical History:   Diagnosis Date    Arthritis     At[facility] )     Depression     Diverticular disease     GERD (gastroesophageal reflux disease)     Hyperlipidemia     Hypertension     Polyarthropathy      Social History     Socioeconomic History    Marital status:      Spouse name: Not on file    Number of children: Not on file    Years of education: Not on file    Highest education level: Not on file   Occupational History    Occupation: Retired Pharmacy Tech   Tobacco Use    Smoking status: Never Smoker    Smokeless tobacco: Never Used   Vaping Use    Vaping Use: Never used   Substance and Sexual Activity    Alcohol use: Yes    Drug use: Never    Sexual activity: Not on file     Comment: Defer   Other Topics Concern    Not on file   Social History Narrative    Not on file     Social Determinants of Health     Financial Resource Strain: Not on file   Food Insecurity: Not on file   Transportation Needs: Not on file   Physical Activity: Not on file   Stress: Not on file   Social Connections: Not on file   Intimate Partner Violence: Not on file   Housing Stability: Not on file      Family History   Problem Relation Age of Onset    Coronary artery disease Mother     Colon cancer Father     Coronary artery disease Son     No Known Problems Son      Past Surgical History:   Procedure Laterality Date    APPENDECTOMY      CARDIAC CATHETERIZATION  1997    CHOLECYSTECTOMY      COLECTOMY      DISCECTOMY SPINE CERVICAL ANTERIOR      HYSTERECTOMY W/ SALPINGO-OOPHERECTOMY      LUMBAR LAMINECTOMY      OTHER SURGICAL HISTORY      spur removal    TONSILLECTOMY         * Current medications reviewed      Allergies   Allergen Reactions    Metronidazole Anaphylaxis     Can Take Oral Only Had A Reaction To IV Flagyl      Adhesive  [Medical Tape]     Advil  [Ibuprofen]     Antihistamines, Loratadine-Type     Demerol [Meperidine]     Ketorolac Tromethamine     Lisinopril Cough    Pantoprazole      severe reaction      Tolmetin        Cardiac Test Results:     Echocardiogram 10/27/2020:  Vigorous LV function with ejection fraction of 65-70%  Mild mitral regurgitation  Mild tricuspid regurgitation  Estimated PASP 25 mmHg  William Beals nuclear stress test 10/27/2020:   No evidence of myocardial scar or ischemia  EF 91%  ZIO 9/30-10/14/2020: 14 days:  Min HR 49  Max  (sinus)  Max  with SVT  Avg HR 65 bpm    79 episodes of SVT with longest lasting 19 2 seconds  Some SVT may be AT with variable block  Frequent PAC's (6 8%)  Rare PVC's (<1 0%)  ECG 9/29/2020: Normal sinus rhythm  Minimal voltage criteria for LVH which may be normal variant  ECG 9/9/2020: Atrial fibrillation with controlled ventricular response  Lipid panel 9/9/2020: C 191  T 198  H 63  L 92  Review of Systems:    Review of Systems   Constitutional: Negative for activity change, appetite change and fatigue  HENT: Negative for congestion, hearing loss, tinnitus and trouble swallowing  Eyes: Negative for visual disturbance  Respiratory: Negative for cough, chest tightness, shortness of breath and wheezing  Cardiovascular: Positive for palpitations (none recent)  Negative for chest pain and leg swelling  Gastrointestinal: Negative for abdominal distention, abdominal pain, nausea and vomiting  Reflux   Genitourinary: Negative for difficulty urinating  Musculoskeletal: Negative for arthralgias  Skin: Negative for rash  Neurological: Positive for dizziness (h/o vertigo)  Negative for syncope and light-headedness  Hematological: Does not bruise/bleed easily  Psychiatric/Behavioral: Negative for confusion  The patient is not nervous/anxious  All other systems reviewed and are negative          Vitals:    03/04/21 1503 03/04/21 1550   BP: 162/70 160/80   BP Location: Left arm Left arm   Patient Position: Sitting    Pulse: 70    SpO2: 99%    Weight: 65 8 kg (145 lb)    Height: 5' 4" (1 626 m)      Vitals:    03/04/21 1503   Weight: 65 8 kg (145 lb)     Height: 5' 4" (162 6 cm)     Physical Exam   Constitutional: She is oriented to person, place, and time  She appears well-developed and well-nourished  Appears younger than stated age   HENT:   Head: Normocephalic and atraumatic  Eyes: Pupils are equal, round, and reactive to light  Conjunctivae are normal    Neck: Normal range of motion  No JVD present  Cardiovascular: Normal rate, regular rhythm and normal heart sounds  Exam reveals no gallop and no friction rub  No murmur heard  Pulmonary/Chest: Effort normal and breath sounds normal    Abdominal: Soft  Bowel sounds are normal    Musculoskeletal:         General: No edema  Neurological: She is alert and oriented to person, place, and time  Skin: Skin is warm and dry  Psychiatric: She has a normal mood and affect   Her behavior is normal

## 2021-03-05 LAB — RYE IGE QN: NEGATIVE

## 2021-03-09 ENCOUNTER — APPOINTMENT (OUTPATIENT)
Dept: LAB | Facility: HOSPITAL | Age: 81
End: 2021-03-09
Payer: MEDICARE

## 2021-03-09 ENCOUNTER — HOSPITAL ENCOUNTER (OUTPATIENT)
Dept: MRI IMAGING | Facility: HOSPITAL | Age: 81
Discharge: HOME | End: 2021-03-09
Payer: MEDICARE

## 2021-03-09 ENCOUNTER — TRANSCRIBE ORDERS (OUTPATIENT)
Dept: ADMINISTRATIVE | Facility: HOSPITAL | Age: 81
End: 2021-03-09

## 2021-03-09 DIAGNOSIS — M47.812 SPONDYLOSIS WITHOUT MYELOPATHY OR RADICULOPATHY, CERVICAL REGION: ICD-10-CM

## 2021-03-09 DIAGNOSIS — Z79.01 LONG TERM (CURRENT) USE OF ANTICOAGULANTS: Primary | ICD-10-CM

## 2021-03-09 DIAGNOSIS — M35.3 POLYMYALGIA RHEUMATICA (HCC): ICD-10-CM

## 2021-03-09 DIAGNOSIS — M54.6 PAIN IN THORACIC SPINE: ICD-10-CM

## 2021-03-09 LAB
CRP SERPL QL: 3.2 MG/L
ERYTHROCYTE [SEDIMENTATION RATE] IN BLOOD: 31 MM/HOUR (ref 0–29)
INR PPP: 2.25 (ref 0.84–1.19)
PROTHROMBIN TIME: 24.4 SECONDS (ref 11.6–14.5)

## 2021-03-09 PROCEDURE — 72146 MRI CHEST SPINE W/O DYE: CPT

## 2021-03-09 PROCEDURE — 72141 MRI NECK SPINE W/O DYE: CPT

## 2021-03-09 PROCEDURE — 85610 PROTHROMBIN TIME: CPT

## 2021-03-09 PROCEDURE — 36415 COLL VENOUS BLD VENIPUNCTURE: CPT

## 2021-03-09 PROCEDURE — 85652 RBC SED RATE AUTOMATED: CPT

## 2021-03-09 PROCEDURE — G1004 CDSM NDSC: HCPCS

## 2021-03-09 PROCEDURE — 86140 C-REACTIVE PROTEIN: CPT

## 2021-03-16 ENCOUNTER — IMMUNIZATIONS (OUTPATIENT)
Dept: FAMILY MEDICINE CLINIC | Facility: HOSPITAL | Age: 81
End: 2021-03-16

## 2021-03-16 DIAGNOSIS — Z23 ENCOUNTER FOR IMMUNIZATION: Primary | ICD-10-CM

## 2021-03-16 PROCEDURE — 91300 SARS-COV-2 / COVID-19 MRNA VACCINE (PFIZER-BIONTECH) 30 MCG: CPT

## 2021-03-16 PROCEDURE — 0001A SARS-COV-2 / COVID-19 MRNA VACCINE (PFIZER-BIONTECH) 30 MCG: CPT

## 2021-03-23 ENCOUNTER — TRANSCRIBE ORDERS (OUTPATIENT)
Dept: ADMINISTRATIVE | Facility: HOSPITAL | Age: 81
End: 2021-03-23

## 2021-03-23 DIAGNOSIS — R93.0 ABNORMAL FINDINGS ON DIAGNOSTIC IMAGING OF SKULL AND HEAD, NOT ELSEWHERE CLASSIFIED: Primary | ICD-10-CM

## 2021-03-24 ENCOUNTER — HOSPITAL ENCOUNTER (OUTPATIENT)
Dept: MRI IMAGING | Facility: HOSPITAL | Age: 81
Discharge: HOME | End: 2021-03-24
Payer: MEDICARE

## 2021-03-24 DIAGNOSIS — R93.0 ABNORMAL FINDINGS ON DIAGNOSTIC IMAGING OF SKULL AND HEAD, NOT ELSEWHERE CLASSIFIED: ICD-10-CM

## 2021-03-24 PROCEDURE — A9585 GADOBUTROL INJECTION: HCPCS | Performed by: RADIOLOGY

## 2021-03-24 PROCEDURE — G1004 CDSM NDSC: HCPCS

## 2021-03-24 PROCEDURE — 70553 MRI BRAIN STEM W/O & W/DYE: CPT

## 2021-03-24 RX ADMIN — GADOBUTROL 6 ML: 604.72 INJECTION INTRAVENOUS at 09:21

## 2021-04-02 ENCOUNTER — DOCTOR'S OFFICE (OUTPATIENT)
Dept: URBAN - NONMETROPOLITAN AREA CLINIC 1 | Facility: CLINIC | Age: 81
Setting detail: OPHTHALMOLOGY
End: 2021-04-02
Payer: COMMERCIAL

## 2021-04-02 DIAGNOSIS — H35.3112: ICD-10-CM

## 2021-04-02 DIAGNOSIS — H25.12: ICD-10-CM

## 2021-04-02 DIAGNOSIS — H35.052: ICD-10-CM

## 2021-04-02 DIAGNOSIS — H35.3222: ICD-10-CM

## 2021-04-02 DIAGNOSIS — H43.813: ICD-10-CM

## 2021-04-02 DIAGNOSIS — H25.11: ICD-10-CM

## 2021-04-02 DIAGNOSIS — D44.3: ICD-10-CM

## 2021-04-02 PROBLEM — H04.121: Status: ACTIVE | Noted: 2019-06-12

## 2021-04-02 PROBLEM — H01.002 BLEPHARITIS; RIGHT UPPER LID, RIGHT LOWER LID, LEFT UPPER LID, LEFT LOWER LID: Status: ACTIVE | Noted: 2018-12-03

## 2021-04-02 PROBLEM — H01.001 BLEPHARITIS; RIGHT UPPER LID, RIGHT LOWER LID, LEFT UPPER LID, LEFT LOWER LID: Status: ACTIVE | Noted: 2018-12-03

## 2021-04-02 PROBLEM — H04.122: Status: ACTIVE | Noted: 2019-06-12

## 2021-04-02 PROBLEM — S05.02XA: Status: ACTIVE | Noted: 2020-01-20

## 2021-04-02 PROBLEM — H40.032 NARROW ANGLE; LEFT EYE: Status: ACTIVE | Noted: 2020-01-20

## 2021-04-02 PROBLEM — H01.005 BLEPHARITIS; RIGHT UPPER LID, RIGHT LOWER LID, LEFT UPPER LID, LEFT LOWER LID: Status: ACTIVE | Noted: 2018-12-03

## 2021-04-02 PROBLEM — H53.123: Status: ACTIVE | Noted: 2018-09-21

## 2021-04-02 PROBLEM — H01.004 BLEPHARITIS; RIGHT UPPER LID, RIGHT LOWER LID, LEFT UPPER LID, LEFT LOWER LID: Status: ACTIVE | Noted: 2018-12-03

## 2021-04-02 PROCEDURE — 92201 OPSCPY EXTND RTA DRAW UNI/BI: CPT | Performed by: OPHTHALMOLOGY

## 2021-04-02 PROCEDURE — 92083 EXTENDED VISUAL FIELD XM: CPT | Performed by: OPHTHALMOLOGY

## 2021-04-02 PROCEDURE — 92134 CPTRZ OPH DX IMG PST SGM RTA: CPT | Performed by: OPHTHALMOLOGY

## 2021-04-02 PROCEDURE — 92014 COMPRE OPH EXAM EST PT 1/>: CPT | Performed by: OPHTHALMOLOGY

## 2021-04-02 ASSESSMENT — TEAR BREAK UP TIME (TBUT)
OS_TBUT: 1+ 2+
OD_TBUT: 1+ 2+

## 2021-04-02 ASSESSMENT — REFRACTION_CURRENTRX
OS_SPHERE: +2.00
OS_ADD: +2.50
OS_CYLINDER: -1.75
OD_SPHERE: +2.25
OD_AXIS: 146
OS_OVR_VA: 20/
OD_OVR_VA: 20/
OD_ADD: +2.50
OD_CYLINDER: -2.00
OS_AXIS: 052
OD_VPRISM_DIRECTION: PROGS
OS_VPRISM_DIRECTION: PROGS

## 2021-04-02 ASSESSMENT — CORNEAL TRAUMA - ABRASION: OS_ABRASION: ABSENT

## 2021-04-02 ASSESSMENT — VISUAL ACUITY
OS_BCVA: 20/30-2
OD_BCVA: 20/70

## 2021-04-02 ASSESSMENT — CONFRONTATIONAL VISUAL FIELD TEST (CVF)
OD_FINDINGS: FULL
OS_FINDINGS: FULL

## 2021-04-02 ASSESSMENT — DRY EYES - PHYSICIAN NOTES
OD_GENERALCOMMENTS: INFERIOR
OS_GENERALCOMMENTS: VERY TRACE INFERIOR SPK

## 2021-04-02 ASSESSMENT — REFRACTION_AUTOREFRACTION
OD_CYLINDER: -2.50
OD_SPHERE: +3.75
OD_AXIS: 118
OS_SPHERE: +1.25
OS_CYLINDER: -1.50
OS_AXIS: 175

## 2021-04-02 ASSESSMENT — SPHEQUIV_DERIVED
OD_SPHEQUIV: 2.5
OS_SPHEQUIV: 0.5

## 2021-04-02 ASSESSMENT — LID EXAM ASSESSMENTS
OD_BLEPHARITIS: RLL RUL T
OS_BLEPHARITIS: LLL LUL T

## 2021-04-02 ASSESSMENT — SUPERFICIAL PUNCTATE KERATITIS (SPK): OD_SPK: T

## 2021-04-08 ENCOUNTER — TRANSCRIBE ORDERS (OUTPATIENT)
Dept: ADMINISTRATIVE | Facility: HOSPITAL | Age: 81
End: 2021-04-08

## 2021-04-08 ENCOUNTER — APPOINTMENT (OUTPATIENT)
Dept: LAB | Facility: HOSPITAL | Age: 81
End: 2021-04-08
Payer: MEDICARE

## 2021-04-08 ENCOUNTER — IMMUNIZATIONS (OUTPATIENT)
Dept: FAMILY MEDICINE CLINIC | Facility: HOSPITAL | Age: 81
End: 2021-04-08
Payer: MEDICARE

## 2021-04-08 DIAGNOSIS — Z23 ENCOUNTER FOR IMMUNIZATION: Primary | ICD-10-CM

## 2021-04-08 DIAGNOSIS — E23.7 DISORDER OF PITUITARY GLAND, UNSPECIFIED (HCC): Primary | ICD-10-CM

## 2021-04-08 DIAGNOSIS — E23.7 DISORDER OF PITUITARY GLAND (HCC): Primary | ICD-10-CM

## 2021-04-08 DIAGNOSIS — E23.7 DISEASE OF PITUITARY GLAND (HCC): Primary | ICD-10-CM

## 2021-04-08 DIAGNOSIS — E23.7 DISORDER OF PITUITARY GLAND (HCC): ICD-10-CM

## 2021-04-08 LAB
CORTIS SERPL-MCNC: 11.5 UG/DL
FSH SERPL-ACNC: 15 MIU/ML
LH SERPL-ACNC: 2.8 MIU/ML
PROLACTIN SERPL-MCNC: 41.7 NG/ML
T4 SERPL-MCNC: 10.2 UG/DL (ref 4.7–13.3)
TSH SERPL DL<=0.05 MIU/L-ACNC: 1.8 UIU/ML (ref 0.36–3.74)

## 2021-04-08 PROCEDURE — 82533 TOTAL CORTISOL: CPT

## 2021-04-08 PROCEDURE — 84436 ASSAY OF TOTAL THYROXINE: CPT

## 2021-04-08 PROCEDURE — 83003 ASSAY GROWTH HORMONE (HGH): CPT

## 2021-04-08 PROCEDURE — 83002 ASSAY OF GONADOTROPIN (LH): CPT

## 2021-04-08 PROCEDURE — 36415 COLL VENOUS BLD VENIPUNCTURE: CPT

## 2021-04-08 PROCEDURE — 83001 ASSAY OF GONADOTROPIN (FSH): CPT

## 2021-04-08 PROCEDURE — 84305 ASSAY OF SOMATOMEDIN: CPT

## 2021-04-08 PROCEDURE — 0002A SARS-COV-2 / COVID-19 MRNA VACCINE (PFIZER-BIONTECH) 30 MCG: CPT

## 2021-04-08 PROCEDURE — 84443 ASSAY THYROID STIM HORMONE: CPT

## 2021-04-08 PROCEDURE — 91300 SARS-COV-2 / COVID-19 MRNA VACCINE (PFIZER-BIONTECH) 30 MCG: CPT

## 2021-04-08 PROCEDURE — 84146 ASSAY OF PROLACTIN: CPT

## 2021-04-10 LAB
GH SERPL-MCNC: 0.3 NG/ML (ref 0–10)
IGF-I SERPL-MCNC: 82 NG/ML (ref 39–177)

## 2021-04-12 ENCOUNTER — HOSPITAL ENCOUNTER (OUTPATIENT)
Dept: CT IMAGING | Facility: HOSPITAL | Age: 81
Discharge: HOME | End: 2021-04-12
Payer: MEDICARE

## 2021-04-12 DIAGNOSIS — E23.7 DISORDER OF PITUITARY GLAND, UNSPECIFIED (HCC): ICD-10-CM

## 2021-04-12 PROCEDURE — G1004 CDSM NDSC: HCPCS

## 2021-04-12 PROCEDURE — 70450 CT HEAD/BRAIN W/O DYE: CPT

## 2021-06-15 ENCOUNTER — OFFICE VISIT (OUTPATIENT)
Dept: CARDIOLOGY CLINIC | Facility: CLINIC | Age: 81
End: 2021-06-15
Payer: MEDICARE

## 2021-06-15 VITALS
HEIGHT: 64 IN | BODY MASS INDEX: 24.59 KG/M2 | SYSTOLIC BLOOD PRESSURE: 148 MMHG | WEIGHT: 144 LBS | DIASTOLIC BLOOD PRESSURE: 80 MMHG | HEART RATE: 72 BPM | OXYGEN SATURATION: 100 %

## 2021-06-15 DIAGNOSIS — E78.2 MIXED HYPERLIPIDEMIA: ICD-10-CM

## 2021-06-15 DIAGNOSIS — I10 ESSENTIAL HYPERTENSION: ICD-10-CM

## 2021-06-15 DIAGNOSIS — I48.0 PAROXYSMAL ATRIAL FIBRILLATION (HCC): Primary | ICD-10-CM

## 2021-06-15 PROCEDURE — 99214 OFFICE O/P EST MOD 30 MIN: CPT | Performed by: INTERNAL MEDICINE

## 2021-10-08 ENCOUNTER — OFFICE VISIT (OUTPATIENT)
Dept: CARDIOLOGY CLINIC | Facility: CLINIC | Age: 81
End: 2021-10-08
Payer: MEDICARE

## 2021-10-08 VITALS
BODY MASS INDEX: 24.79 KG/M2 | WEIGHT: 145.2 LBS | SYSTOLIC BLOOD PRESSURE: 154 MMHG | DIASTOLIC BLOOD PRESSURE: 62 MMHG | HEART RATE: 92 BPM | OXYGEN SATURATION: 92 % | HEIGHT: 64 IN

## 2021-10-08 DIAGNOSIS — I10 ESSENTIAL HYPERTENSION: ICD-10-CM

## 2021-10-08 DIAGNOSIS — I48.0 PAROXYSMAL ATRIAL FIBRILLATION (HCC): Primary | ICD-10-CM

## 2021-10-08 DIAGNOSIS — R07.89 CHEST DISCOMFORT: ICD-10-CM

## 2021-10-08 DIAGNOSIS — E78.2 MIXED HYPERLIPIDEMIA: ICD-10-CM

## 2021-10-08 PROCEDURE — 99214 OFFICE O/P EST MOD 30 MIN: CPT | Performed by: INTERNAL MEDICINE

## 2021-10-08 NOTE — PATIENT INSTRUCTIONS
Continue to monitor for worsening symptoms of chest tightness, or left arm numbness/tingling, or increasing fatigue  Call me with any change in symptoms or with any questions

## 2021-11-18 ENCOUNTER — OFFICE VISIT (OUTPATIENT)
Dept: CARDIOLOGY CLINIC | Facility: CLINIC | Age: 81
End: 2021-11-18
Payer: MEDICARE

## 2021-11-18 VITALS
HEART RATE: 78 BPM | OXYGEN SATURATION: 95 % | SYSTOLIC BLOOD PRESSURE: 130 MMHG | WEIGHT: 147 LBS | DIASTOLIC BLOOD PRESSURE: 60 MMHG | HEIGHT: 64 IN | BODY MASS INDEX: 25.1 KG/M2

## 2021-11-18 DIAGNOSIS — E78.2 MIXED HYPERLIPIDEMIA: ICD-10-CM

## 2021-11-18 DIAGNOSIS — R07.89 CHEST DISCOMFORT: ICD-10-CM

## 2021-11-18 DIAGNOSIS — I48.0 PAROXYSMAL ATRIAL FIBRILLATION (HCC): Primary | ICD-10-CM

## 2021-11-18 DIAGNOSIS — I10 ESSENTIAL HYPERTENSION: ICD-10-CM

## 2021-11-18 PROCEDURE — 99214 OFFICE O/P EST MOD 30 MIN: CPT | Performed by: NURSE PRACTITIONER

## 2021-11-20 PROBLEM — R07.89 CHEST DISCOMFORT: Status: ACTIVE | Noted: 2021-11-20

## 2022-01-16 ENCOUNTER — APPOINTMENT (EMERGENCY)
Dept: CT IMAGING | Facility: HOSPITAL | Age: 82
End: 2022-01-16
Payer: MEDICARE

## 2022-01-16 ENCOUNTER — HOSPITAL ENCOUNTER (OUTPATIENT)
Facility: HOSPITAL | Age: 82
Setting detail: OBSERVATION
Discharge: HOME HEALTH | End: 2022-01-17
Attending: EMERGENCY MEDICINE | Admitting: FAMILY MEDICINE
Payer: MEDICARE

## 2022-01-16 ENCOUNTER — APPOINTMENT (EMERGENCY)
Dept: RADIOLOGY | Facility: HOSPITAL | Age: 82
End: 2022-01-16
Payer: MEDICARE

## 2022-01-16 DIAGNOSIS — R26.2 AMBULATORY DYSFUNCTION: ICD-10-CM

## 2022-01-16 DIAGNOSIS — M25.561 ACUTE PAIN OF RIGHT KNEE: Primary | ICD-10-CM

## 2022-01-16 DIAGNOSIS — M79.604 RIGHT LEG PAIN: ICD-10-CM

## 2022-01-16 PROBLEM — M79.606 LEG PAIN: Status: ACTIVE | Noted: 2022-01-16

## 2022-01-16 LAB
ANION GAP SERPL CALCULATED.3IONS-SCNC: 9 MMOL/L (ref 4–13)
APTT PPP: 35 SECONDS (ref 23–37)
BASOPHILS # BLD AUTO: 0.04 THOUSANDS/ΜL (ref 0–0.1)
BASOPHILS NFR BLD AUTO: 0 % (ref 0–1)
BUN SERPL-MCNC: 19 MG/DL (ref 5–25)
CALCIUM SERPL-MCNC: 9 MG/DL (ref 8.3–10.1)
CHLORIDE SERPL-SCNC: 104 MMOL/L (ref 100–108)
CO2 SERPL-SCNC: 27 MMOL/L (ref 21–32)
CREAT SERPL-MCNC: 1.12 MG/DL (ref 0.6–1.3)
CRP SERPL QL: 0.8 MG/L
EOSINOPHIL # BLD AUTO: 0.09 THOUSAND/ΜL (ref 0–0.61)
EOSINOPHIL NFR BLD AUTO: 1 % (ref 0–6)
ERYTHROCYTE [DISTWIDTH] IN BLOOD BY AUTOMATED COUNT: 13.8 % (ref 11.6–15.1)
ERYTHROCYTE [SEDIMENTATION RATE] IN BLOOD: 24 MM/HOUR (ref 0–29)
GFR SERPL CREATININE-BSD FRML MDRD: 45 ML/MIN/1.73SQ M
GLUCOSE SERPL-MCNC: 104 MG/DL (ref 65–140)
HCT VFR BLD AUTO: 38.7 % (ref 34.8–46.1)
HGB BLD-MCNC: 12.8 G/DL (ref 11.5–15.4)
IMM GRANULOCYTES # BLD AUTO: 0.04 THOUSAND/UL (ref 0–0.2)
IMM GRANULOCYTES NFR BLD AUTO: 0 % (ref 0–2)
INR PPP: 1.78 (ref 0.84–1.19)
LYMPHOCYTES # BLD AUTO: 2.54 THOUSANDS/ΜL (ref 0.6–4.47)
LYMPHOCYTES NFR BLD AUTO: 26 % (ref 14–44)
MCH RBC QN AUTO: 32.6 PG (ref 26.8–34.3)
MCHC RBC AUTO-ENTMCNC: 33.1 G/DL (ref 31.4–37.4)
MCV RBC AUTO: 99 FL (ref 82–98)
MONOCYTES # BLD AUTO: 0.71 THOUSAND/ΜL (ref 0.17–1.22)
MONOCYTES NFR BLD AUTO: 7 % (ref 4–12)
NEUTROPHILS # BLD AUTO: 6.51 THOUSANDS/ΜL (ref 1.85–7.62)
NEUTS SEG NFR BLD AUTO: 66 % (ref 43–75)
NRBC BLD AUTO-RTO: 0 /100 WBCS
PLATELET # BLD AUTO: 289 THOUSANDS/UL (ref 149–390)
PMV BLD AUTO: 10 FL (ref 8.9–12.7)
POTASSIUM SERPL-SCNC: 4 MMOL/L (ref 3.5–5.3)
PROTHROMBIN TIME: 20.4 SECONDS (ref 11.6–14.5)
RBC # BLD AUTO: 3.93 MILLION/UL (ref 3.81–5.12)
SODIUM SERPL-SCNC: 140 MMOL/L (ref 136–145)
URATE SERPL-MCNC: 3 MG/DL (ref 2–6.8)
WBC # BLD AUTO: 9.93 THOUSAND/UL (ref 4.31–10.16)

## 2022-01-16 PROCEDURE — 80048 BASIC METABOLIC PNL TOTAL CA: CPT | Performed by: PHYSICIAN ASSISTANT

## 2022-01-16 PROCEDURE — 73700 CT LOWER EXTREMITY W/O DYE: CPT

## 2022-01-16 PROCEDURE — 85730 THROMBOPLASTIN TIME PARTIAL: CPT | Performed by: PHYSICIAN ASSISTANT

## 2022-01-16 PROCEDURE — 99285 EMERGENCY DEPT VISIT HI MDM: CPT

## 2022-01-16 PROCEDURE — 85652 RBC SED RATE AUTOMATED: CPT | Performed by: FAMILY MEDICINE

## 2022-01-16 PROCEDURE — 73560 X-RAY EXAM OF KNEE 1 OR 2: CPT

## 2022-01-16 PROCEDURE — 99285 EMERGENCY DEPT VISIT HI MDM: CPT | Performed by: PHYSICIAN ASSISTANT

## 2022-01-16 PROCEDURE — 85025 COMPLETE CBC W/AUTO DIFF WBC: CPT | Performed by: PHYSICIAN ASSISTANT

## 2022-01-16 PROCEDURE — 85610 PROTHROMBIN TIME: CPT | Performed by: PHYSICIAN ASSISTANT

## 2022-01-16 PROCEDURE — 86140 C-REACTIVE PROTEIN: CPT | Performed by: FAMILY MEDICINE

## 2022-01-16 PROCEDURE — 99220 PR INITIAL OBSERVATION CARE/DAY 70 MINUTES: CPT | Performed by: FAMILY MEDICINE

## 2022-01-16 PROCEDURE — 84550 ASSAY OF BLOOD/URIC ACID: CPT | Performed by: FAMILY MEDICINE

## 2022-01-16 PROCEDURE — 36415 COLL VENOUS BLD VENIPUNCTURE: CPT | Performed by: PHYSICIAN ASSISTANT

## 2022-01-16 RX ORDER — MAGNESIUM CHLORIDE 64 MG
64 TABLET, DELAYED RELEASE (ENTERIC COATED) ORAL DAILY
Status: DISCONTINUED | OUTPATIENT
Start: 2022-01-16 | End: 2022-01-17 | Stop reason: HOSPADM

## 2022-01-16 RX ORDER — METHYLPREDNISOLONE SODIUM SUCCINATE 125 MG/2ML
60 INJECTION, POWDER, LYOPHILIZED, FOR SOLUTION INTRAMUSCULAR; INTRAVENOUS ONCE
Status: COMPLETED | OUTPATIENT
Start: 2022-01-16 | End: 2022-01-16

## 2022-01-16 RX ORDER — ALPRAZOLAM 0.5 MG/1
1 TABLET ORAL DAILY PRN
Status: DISCONTINUED | OUTPATIENT
Start: 2022-01-16 | End: 2022-01-17 | Stop reason: HOSPADM

## 2022-01-16 RX ORDER — ACETAMINOPHEN 325 MG/1
650 TABLET ORAL EVERY 6 HOURS SCHEDULED
Status: DISCONTINUED | OUTPATIENT
Start: 2022-01-16 | End: 2022-01-17 | Stop reason: HOSPADM

## 2022-01-16 RX ORDER — B-COMPLEX WITH VITAMIN C
1 TABLET ORAL
Status: DISCONTINUED | OUTPATIENT
Start: 2022-01-17 | End: 2022-01-17 | Stop reason: HOSPADM

## 2022-01-16 RX ORDER — FAMOTIDINE 20 MG/1
20 TABLET, FILM COATED ORAL 2 TIMES DAILY
Status: DISCONTINUED | OUTPATIENT
Start: 2022-01-16 | End: 2022-01-17 | Stop reason: HOSPADM

## 2022-01-16 RX ORDER — MECLIZINE HCL 12.5 MG/1
12.5 TABLET ORAL EVERY 8 HOURS PRN
Status: DISCONTINUED | OUTPATIENT
Start: 2022-01-16 | End: 2022-01-17 | Stop reason: HOSPADM

## 2022-01-16 RX ORDER — ACETAMINOPHEN 325 MG/1
650 TABLET ORAL EVERY 6 HOURS PRN
Status: DISCONTINUED | OUTPATIENT
Start: 2022-01-16 | End: 2022-01-16

## 2022-01-16 RX ORDER — MULTIVIT-MIN/FERROUS GLUCONATE 9 MG/15 ML
15 LIQUID (ML) ORAL DAILY
Status: DISCONTINUED | OUTPATIENT
Start: 2022-01-16 | End: 2022-01-17 | Stop reason: HOSPADM

## 2022-01-16 RX ORDER — CALCIUM CARBONATE 200(500)MG
1000 TABLET,CHEWABLE ORAL DAILY PRN
Status: DISCONTINUED | OUTPATIENT
Start: 2022-01-16 | End: 2022-01-17 | Stop reason: HOSPADM

## 2022-01-16 RX ORDER — PREDNISONE 20 MG/1
40 TABLET ORAL DAILY
Status: DISCONTINUED | OUTPATIENT
Start: 2022-01-17 | End: 2022-01-17 | Stop reason: HOSPADM

## 2022-01-16 RX ORDER — METOPROLOL SUCCINATE 25 MG/1
37.5 TABLET, EXTENDED RELEASE ORAL DAILY
Status: DISCONTINUED | OUTPATIENT
Start: 2022-01-16 | End: 2022-01-17 | Stop reason: HOSPADM

## 2022-01-16 RX ORDER — LOSARTAN POTASSIUM 50 MG/1
50 TABLET ORAL DAILY
Status: DISCONTINUED | OUTPATIENT
Start: 2022-01-16 | End: 2022-01-17 | Stop reason: HOSPADM

## 2022-01-16 RX ADMIN — ALPRAZOLAM 1 MG: 0.5 TABLET ORAL at 18:47

## 2022-01-16 RX ADMIN — SENNOSIDES A AND B 15 ML: 8.8 SYRUP ORAL at 18:45

## 2022-01-16 RX ADMIN — MAGNESIUM 64 MG (MAGNESIUM CHLORIDE) TABLET,DELAYED RELEASE 64 MG: at 18:45

## 2022-01-16 RX ADMIN — ACETAMINOPHEN 650 MG: 325 TABLET, FILM COATED ORAL at 18:45

## 2022-01-16 RX ADMIN — METHYLPREDNISOLONE SODIUM SUCCINATE 60 MG: 125 INJECTION, POWDER, FOR SOLUTION INTRAMUSCULAR; INTRAVENOUS at 15:45

## 2022-01-16 RX ADMIN — FAMOTIDINE 20 MG: 20 TABLET, FILM COATED ORAL at 18:45

## 2022-01-16 NOTE — H&P
[address]  [place] Jewel Sp[place] 1940, 79 y o  female MRN: [id number]  Unit/Bed#: Z2 H9 Encounter: [id number]  Primary Care Provider: Kevin Streeter DO   Date and time admitted to hospital: 1/16/2022 12:55 PM    * Leg pain  Assessment & Plan  Patient is complaining of right knee pain for last 3-4 days which is progressively worsening to the point where she cannot stand or walk  Denies any falls  Was recently being treated for cellulitis with Keflex  Negative venous Doppler for DVT done a few days ago outpatient  Noted to have mild swelling of the right popliteal fossa and right knee  No redness or warmth noted  Will do CT right lower extremity to rule out effusion, Baker cyst etc  Will give 1 dose of Solu-Medrol 60 mg IV x1  Check ESR, CRP, uric acid  Hold Coumadin in case knee needs to be aspirated  Consult placed to orthopedics and PT OT  Placed on Tylenol for pain control and IV steroid shot given  Patient has side effects to narcotics and hence will try to avoid them    Mixed hyperlipidemia  Assessment & Plan  Crestor on hold    Essential hypertension  Assessment & Plan  Blood pressure elevated secondary to knee pain  Continue losartan and metoprolol    Atrial fibrillation (HCC)  Assessment & Plan   rate controlled with Toprol-XL  Hold Coumadin in case knee needs to be aspirated      VTE Prophylaxis: Warfarin (Coumadin)  / sequential compression device   Code Status: dnr/dni  POLST: There is no POLST form on file for this patient (pre-hospital)  Discussion with family:  Discussed with son    Anticipated Length of Stay:  Patient will be admitted on an Observation basis with an anticipated length of stay of  less than 2 midnights  Justification for Hospital Stay:  Right knee pain    Total Time for Visit, including Counseling / Coordination of Care: 45 minutes  Greater than 50% of this total time spent on direct patient counseling and coordination of care      Chief Complaint:   Right knee pain    History of Present Illness:    Anum Stewart is a 79 y o  female who presents with right knee pain  Patient states that she is normally a very active individual and the last few days has been having progressively worsening right leg pain  Initially it was felt to be cellulitis and treated with oral Keflex also do venous Doppler done which was negative however today got much worse to the point that she cant stand or walk anymore  Pain is 7-8 x 10 with movement  Denies any falls or injuries    Review of Systems:    Review of Systems   Constitutional: Negative for appetite change, chills, fatigue and fever  HENT: Negative for hearing loss, sore throat and trouble swallowing  Eyes: Negative for photophobia, discharge and visual disturbance  Respiratory: Negative for chest tightness and shortness of breath  Cardiovascular: Negative for chest pain and palpitations  Gastrointestinal: Negative for abdominal pain, blood in stool and vomiting  Endocrine: Negative for polydipsia and polyuria  Genitourinary: Negative for difficulty urinating, dysuria, flank pain and hematuria  Musculoskeletal: Positive for arthralgias and gait problem  Negative for back pain  Skin: Negative for rash  Allergic/Immunologic: Negative for environmental allergies and food allergies  Neurological: Negative for dizziness, seizures, syncope and headaches  Hematological: Does not bruise/bleed easily  Psychiatric/Behavioral: Negative for behavioral problems  All other systems reviewed and are negative        Past Medical and Surgical History:     Past Medical History:   Diagnosis Date    Atrial fibrillation ([address] )     Depression     Diverticular disease     GERD (gastroesophageal reflux disease)     Hyperlipidemia     Hypertension     Polyarthropathy        Past Surgical History:   Procedure Laterality Date    APPENDECTOMY      CARDIAC CATHETERIZATION  1997    CHOLECYSTECTOMY      COLECTOMY      DISCECTOMY SPINE CERVICAL ANTERIOR      HYSTERECTOMY W/ SALPINGO-OOPHERECTOMY      LUMBAR LAMINECTOMY      OTHER SURGICAL HISTORY      spur removal    TONSILLECTOMY         Meds/Allergies:    Prior to Admission medications    Medication Sig Start Date End Date Taking? Authorizing Provider   ALPRAZolam Jerryclaribel Quinntana) 1 mg tablet Take 1 mg by mouth daily as needed      Historical Provider, MD   Calcium Carbonate-Vit D-Min (CALTRATE 600+D PLUS PO) Caltrate 600 plus D   1 PO QD    Historical Provider, MD   Cholecalciferol (VITAMIN D3 PO) Take by mouth    Historical Provider, MD   famotidine (PEPCID) 20 mg tablet Take 20 mg by mouth 2 (two) times a day 9/12/20   Historical Provider, MD   losartan (COZAAR) 50 mg tablet Take 50 mg by mouth daily 8/13/20   Historical Provider, MD   Magnesium 250 MG TABS     Historical Provider, MD   meclizine (ANTIVERT) 25 mg tablet meclizine 25 mg tablet   take 1 tablet by mouth three times a day if needed for dizziness    Historical Provider, MD   metoprolol succinate (TOPROL-XL) 25 mg 24 hr tablet TAKE 1 5 TABLETS BY MOUTH DAILY 10/25/21   CHE Iglesias   Multiple Vitamins-Minerals (CENTRUM SILVER PO) Centrum Silver   1 PO QD    Historical Provider, MD   Multiple Vitamins-Minerals (PRESERVISION AREDS 2 PO) Take by mouth    Historical Provider, MD   rosuvastatin (CRESTOR) 10 MG tablet Take 10 mg by mouth daily  Patient not taking: Reported on 11/18/2021 8/25/20   Historical Provider, MD   warfarin (COUMADIN) 2 mg tablet Take 2 mg by mouth daily  9/9/20   Historical Provider, MD     I have reviewed home medications with patient personally  Allergies:    Allergies   Allergen Reactions    Metronidazole Anaphylaxis     Can Take Oral Only Had A Reaction To IV Flagyl      Adhesive  [Medical Tape]     Advil  [Ibuprofen]     Antihistamines, Loratadine-Type     Demerol [Meperidine]     Ketorolac Tromethamine     Lisinopril Cough    Pantoprazole      severe reaction  Tolmetin        Social History:     Marital Status:     Social History     Substance and Sexual Activity   Alcohol Use Yes     Social History     Tobacco Use   Smoking Status Never Smoker   Smokeless Tobacco Never Used     Social History     Substance and Sexual Activity   Drug Use Never       Family History:    Family History   Problem Relation Age of Onset    Coronary artery disease Mother     Colon cancer Father     Coronary artery disease Son     No Known Problems Son        Physical Exam:     Vitals:   Blood Pressure: (!) 179/82 (01/16/22 1457)  Pulse: 94 (01/16/22 1457)  Temperature: 98 6 °F (37 °C) (01/16/22 1259)  Temp Source: Temporal (01/16/22 1259)  Respirations: 16 (01/16/22 1457)  Height: 5' 3" (160 cm) (01/16/22 1259)  Weight - Scale: 63 5 kg (140 lb) (01/16/22 1259)  SpO2: 99 % (01/16/22 1457)    Physical Exam  Vitals and nursing note reviewed  Constitutional:       Appearance: Normal appearance  HENT:      Head: Normocephalic and atraumatic  Right Ear: External ear normal       Left Ear: External ear normal       Nose: Nose normal       Mouth/Throat:      Pharynx: Oropharynx is clear  Eyes:      Pupils: Pupils are equal, round, and reactive to light  Cardiovascular:      Rate and Rhythm: Normal rate and regular rhythm  Heart sounds: Normal heart sounds  Pulmonary:      Effort: Pulmonary effort is normal       Breath sounds: Normal breath sounds  Abdominal:      General: Bowel sounds are normal       Palpations: Abdomen is soft  Tenderness: There is no abdominal tenderness  Musculoskeletal:      Cervical back: Normal range of motion and neck supple  Comments: Tenderness on flexion of the right knee noted with fullness swelling noted of the right popliteal fossa and mild swelling noted of the right knee with no redness or warmth noted   Skin:     General: Skin is warm and dry  Capillary Refill: Capillary refill takes less than 2 seconds  Neurological:      General: No focal deficit present  Mental Status: She is alert and oriented to person, place, and time  Psychiatric:         Mood and Affect: Mood normal            Additional Data:     Lab Results: I have personally reviewed pertinent reports  Results from last 7 days   Lab Units 01/16/22  1337   WBC Thousand/uL 9 93   HEMOGLOBIN g/dL 12 8   HEMATOCRIT % 38 7   PLATELETS Thousands/uL 289   NEUTROS PCT % 66   LYMPHS PCT % 26   MONOS PCT % 7   EOS PCT % 1     Results from last 7 days   Lab Units 01/16/22  1337   SODIUM mmol/L 140   POTASSIUM mmol/L 4 0   CHLORIDE mmol/L 104   CO2 mmol/L 27   BUN mg/dL 19   CREATININE mg/dL 1 12   ANION GAP mmol/L 9   CALCIUM mg/dL 9 0   GLUCOSE RANDOM mg/dL 104     Results from last 7 days   Lab Units 01/16/22  1337   INR  1 78*                   Imaging: I have personally reviewed pertinent reports  XR knee 1 or 2 views right    (Results Pending)   CT lower extremity wo contrast right    (Results Pending)       EKG, Pathology, and Other Studies Reviewed on Admission:   · EKG:  AFib    Allscripts / Epic Records Reviewed: Yes     ** Please Note: This note has been constructed using a voice recognition system   **

## 2022-01-16 NOTE — ASSESSMENT & PLAN NOTE
Patient is complaining of right knee pain for last 3-4 days which is progressively worsening to the point where she cannot stand or walk  Denies any falls  Was recently being treated for cellulitis with Keflex  Negative venous Doppler for DVT done a few days ago outpatient  Noted to have mild swelling of the right popliteal fossa and right knee  No redness or warmth noted  Will do CT right lower extremity to rule out effusion, Baker cyst etc  Will give 1 dose of Solu-Medrol 60 mg IV x1  Check ESR, CRP, uric acid    Hold Coumadin in case knee needs to be aspirated  Consult placed to orthopedics and PT OT  Placed on Tylenol for pain control and IV steroid shot given  Patient has side effects to narcotics and hence will try to avoid them

## 2022-01-16 NOTE — ED PROVIDER NOTES
History  Chief Complaint   Patient presents with    Leg Pain     pt states R leg pain where she can't walk  dx with cellulitis on friday and placed on keflex  states increase in pain  79year old female presents emergency department for evaluation of right leg pain  Patient states pain started on Friday and she was seen by her PCP noticed the right leg was swollen  Patient was sent for x-rays of the right foot, ankle, tib-fib all which were unremarkable  Also had a duplex which was negative for DVT  Patient was started on Keflex for suspected cellulitis  Patient states she has been taking Keflex as prescribed  She reports swelling has improved however states she continues with pain which seems to be worsening  States she is no longer able to ambulate on the leg  Reports she is unable to ambulate with her walker  States she lives at home alone  Patient does take Coumadin and states her levels were recently elevated  States she was instructed to skip her dose on Friday and today  Patient states she does now have 2 bruises on the right lower extremity  She reports pain significantly behind the knee  She denies any fall, trauma, injury  She denies any weakness or numbness  She denies any fevers or chills  History provided by:  Patient  Leg Pain  Location:  Knee  Knee location:  R knee  Pain details:     Quality:  Unable to specify    Severity:  Severe    Onset quality:  Gradual    Progression:  Worsening  Chronicity:  New  Relieved by:  Nothing  Worsened by:  Bearing weight and flexion  Ineffective treatments:  Rest (keflex)  Associated symptoms: decreased ROM    Associated symptoms: no back pain, no fatigue, no fever, no itching, no muscle weakness, no neck pain, no numbness, no stiffness, no swelling and no tingling        Prior to Admission Medications   Prescriptions Last Dose Informant Patient Reported? Taking?    ALPRAZolam (XANAX) 1 mg tablet   Yes No   Sig: Take 1 mg by mouth daily as needed     Calcium Carbonate-Vit D-Min (CALTRATE 600+D PLUS PO)   Yes No   Sig: Caltrate 600 plus D   1 PO QD   Cholecalciferol (VITAMIN D3 PO)   Yes No   Sig: Take by mouth   Magnesium 250 MG TABS   Yes No   Multiple Vitamins-Minerals (CENTRUM SILVER PO)   Yes No   Sig: Centrum Silver   1 PO QD   Multiple Vitamins-Minerals (PRESERVISION AREDS 2 PO)   Yes No   Sig: Take by mouth   famotidine (PEPCID) 20 mg tablet   Yes No   Sig: Take 20 mg by mouth 2 (two) times a day   losartan (COZAAR) 50 mg tablet   Yes No   Sig: Take 50 mg by mouth daily   meclizine (ANTIVERT) 25 mg tablet   Yes No   Sig: meclizine 25 mg tablet   take 1 tablet by mouth three times a day if needed for dizziness   metoprolol succinate (TOPROL-XL) 25 mg 24 hr tablet   No No   Sig: TAKE 1 5 TABLETS BY MOUTH DAILY   rosuvastatin (CRESTOR) 10 MG tablet   Yes No   Sig: Take 10 mg by mouth daily   Patient not taking: Reported on 11/18/2021    warfarin (COUMADIN) 2 mg tablet   Yes No   Sig: Take 2 mg by mouth daily       Facility-Administered Medications: None       Past Medical History:   Diagnosis Date    Atrial fibrillation (HCC)     Depression     Diverticular disease     GERD (gastroesophageal reflux disease)     Hyperlipidemia     Hypertension     Polyarthropathy        Past Surgical History:   Procedure Laterality Date    APPENDECTOMY      CARDIAC CATHETERIZATION  1997    CHOLECYSTECTOMY      COLECTOMY      DISCECTOMY SPINE CERVICAL ANTERIOR      HYSTERECTOMY W/ SALPINGO-OOPHERECTOMY      LUMBAR LAMINECTOMY      OTHER SURGICAL HISTORY      spur removal    TONSILLECTOMY         Family History   Problem Relation Age of Onset    Coronary artery disease Mother     Colon cancer Father     Coronary artery disease Son     No Known Problems Son      I have reviewed and agree with the history as documented      E-Cigarette/Vaping    E-Cigarette Use Never User      E-Cigarette/Vaping Substances    Nicotine No     THC No     CBD No  Flavoring No     Other No     Unknown No      Social History     Tobacco Use    Smoking status: Never Smoker    Smokeless tobacco: Never Used   Vaping Use    Vaping Use: Never used   Substance Use Topics    Alcohol use: Yes    Drug use: Never       Review of Systems   Constitutional: Negative  Negative for appetite change, chills, diaphoresis, fatigue and fever  HENT: Negative  Respiratory: Negative  Cardiovascular: Negative  Gastrointestinal: Negative  Musculoskeletal: Positive for arthralgias, gait problem and joint swelling  Negative for back pain, neck pain and stiffness  Skin: Positive for color change  Negative for itching  All other systems reviewed and are negative  Physical Exam  Physical Exam  Vitals and nursing note reviewed  Constitutional:       General: She is not in acute distress  Appearance: Normal appearance  She is normal weight  She is not ill-appearing, toxic-appearing or diaphoretic  HENT:      Head: Normocephalic  Nose: Nose normal       Mouth/Throat:      Mouth: Mucous membranes are moist       Pharynx: Oropharynx is clear  No oropharyngeal exudate or posterior oropharyngeal erythema  Eyes:      Conjunctiva/sclera: Conjunctivae normal       Pupils: Pupils are equal, round, and reactive to light  Cardiovascular:      Rate and Rhythm: Normal rate and regular rhythm  Pulses:           Dorsalis pedis pulses are 2+ on the right side and 2+ on the left side  Posterior tibial pulses are 2+ on the right side and 2+ on the left side  Pulmonary:      Effort: Pulmonary effort is normal  No respiratory distress  Breath sounds: Normal breath sounds  No stridor  No wheezing, rhonchi or rales  Chest:      Chest wall: No tenderness  Musculoskeletal:         General: Swelling and tenderness present  Cervical back: Normal range of motion  Right knee: Swelling present  Decreased range of motion   Tenderness (behind the knee) present  Comments: Decreased ROM of right knee, pain with flexion   Skin:     General: Skin is warm and dry  Capillary Refill: Capillary refill takes less than 2 seconds  Findings: Bruising (two bruises rt lower extremity, 1 below and 1 above the knee) present  No erythema or rash  Comments: No warmth, erythema, open wounds or signs of infection   Neurological:      General: No focal deficit present  Mental Status: She is alert and oriented to person, place, and time     Psychiatric:         Mood and Affect: Mood normal          Behavior: Behavior normal          Vital Signs  ED Triage Vitals   Temperature Pulse Respirations Blood Pressure SpO2   01/16/22 1259 01/16/22 1259 01/16/22 1259 01/16/22 1300 01/16/22 1259   98 6 °F (37 °C) 91 20 (!) 178/94 97 %      Temp Source Heart Rate Source Patient Position - Orthostatic VS BP Location FiO2 (%)   01/16/22 1259 01/16/22 1259 01/16/22 1259 01/16/22 1259 --   Temporal Apical Sitting Right arm       Pain Score       01/16/22 1259       10 - Worst Possible Pain           Vitals:    01/16/22 1259 01/16/22 1300 01/16/22 1457   BP:  (!) 178/94 (!) 179/82   Pulse: 91  94   Patient Position - Orthostatic VS: Sitting  Lying         Visual Acuity      ED Medications  Medications   ALPRAZolam (XANAX) tablet 1 mg (has no administration in time range)   calcium carbonate-vitamin D (OSCAL-D) 500 mg-200 units per tablet 1 tablet (has no administration in time range)   famotidine (PEPCID) tablet 20 mg (has no administration in time range)   losartan (COZAAR) tablet 50 mg (has no administration in time range)   magnesium chloride (MAG64) EC tablet TBEC 64 mg (has no administration in time range)   meclizine (ANTIVERT) tablet 12 5 mg (has no administration in time range)   metoprolol succinate (TOPROL-XL) 24 hr tablet 37 5 mg (has no administration in time range)   multivitamin with iron-minerals liquid 15 mL (has no administration in time range)   calcium carbonate (TUMS) chewable tablet 1,000 mg (has no administration in time range)   methylPREDNISolone sodium succinate (Solu-MEDROL) injection 60 mg (has no administration in time range)   acetaminophen (TYLENOL) tablet 650 mg (has no administration in time range)   predniSONE tablet 40 mg (has no administration in time range)       Diagnostic Studies  Results Reviewed     Procedure Component Value Units Date/Time    C-reactive protein [[id number]]  (Normal) Collected: 01/16/22 1337    Lab Status: Final result Specimen: Blood from Arm, Right Updated: 01/16/22 1532     CRP 0 8 mg/L     Uric acid [[id number]]  (Normal) Collected: 01/16/22 1337    Lab Status: Final result Specimen: Blood from Arm, Right Updated: 01/16/22 1532     Uric Acid 3 0 mg/dL     Sedimentation rate, automated [[id number]]     Lab Status: No result Specimen: Blood     Protime-INR [[id number]]  (Abnormal) Collected: 01/16/22 1337    Lab Status: Final result Specimen: Blood from Arm, Right Updated: 01/16/22 1405     Protime 20 4 seconds      INR 1 78    APTT [[id number]]  (Normal) Collected: 01/16/22 1337    Lab Status: Final result Specimen: Blood from Arm, Right Updated: 01/16/22 1405     PTT 35 seconds     Basic metabolic panel [[id number]] Collected: 01/16/22 1337    Lab Status: Final result Specimen: Blood from Arm, Right Updated: 01/16/22 1359     Sodium 140 mmol/L      Potassium 4 0 mmol/L      Chloride 104 mmol/L      CO2 27 mmol/L      ANION GAP 9 mmol/L      BUN 19 mg/dL      Creatinine 1 12 mg/dL      Glucose 104 mg/dL      Calcium 9 0 mg/dL      eGFR 45 ml/min/1 73sq m     Narrative:      [place] guidelines for Chronic Kidney Disease (CKD):     Stage 1 with normal or high GFR (GFR > 90 mL/min/1 73 square meters)    Stage 2 Mild CKD (GFR = 60-89 mL/min/1 73 square meters)    Stage 3A Moderate CKD (GFR = 45-59 mL/min/1 73 square meters)    Stage 3B Moderate CKD (GFR = 30-44 mL/min/1 73 square meters)    Stage 4 Severe CKD (GFR = 15-29 mL/min/1 73 square meters)    Stage 5 End Stage CKD (GFR <15 mL/min/1 73 square meters)  Note: GFR calculation is accurate only with a steady state creatinine    CBC and differential [[id number]]  (Abnormal) Collected: 01/16/22 1337    Lab Status: Final result Specimen: Blood from Arm, Right Updated: 01/16/22 1346     WBC 9 93 Thousand/uL      RBC 3 93 Million/uL      Hemoglobin 12 8 g/dL      Hematocrit 38 7 %      MCV 99 fL      MCH 32 6 pg      MCHC 33 1 g/dL      RDW 13 8 %      MPV 10 0 fL      Platelets 663 Thousands/uL      nRBC 0 /100 WBCs      Neutrophils Relative 66 %      Immat GRANS % 0 %      Lymphocytes Relative 26 %      Monocytes Relative 7 %      Eosinophils Relative 1 %      Basophils Relative 0 %      Neutrophils Absolute 6 51 Thousands/µL      Immature Grans Absolute 0 04 Thousand/uL      Lymphocytes Absolute 2 54 Thousands/µL      Monocytes Absolute 0 71 Thousand/µL      Eosinophils Absolute 0 09 Thousand/µL      Basophils Absolute 0 04 Thousands/µL                  CT lower extremity wo contrast right   Final Result by Davi Browne MD (01/16 1547)      No acute osseous abnormality  Mild tricompartmental osteoarthritis of right knee with an 8 mm intra-articular loose body posterior to medial femoral condyle  Workstation performed: [id number]         XR knee 1 or 2 views right    (Results Pending)              Procedures  Procedures         ED Course  ED Course as of 01/16/22 1623   Sun Jan 16, 2022   1405 WBC: 9 93   1426 ED interpretation of x-ray negative for acute osseous injury   1426 Patient is unable to bend leg  She is unable to bear any weight on the leg  Unable to ambulate     [address] text sent to medicine physician requesting admission   1433 Dr Diana Gallardo requesting CT scan   [phone] Patient accepted by medicine service                               SBIRT 22yo+      Most Recent Value   SBIRT (25 yo +)    In order to provide better care to our patients, we are screening all of our patients for alcohol and drug use  Would it be okay to ask you these screening questions? Yes Filed at: 01/16/2022 9683   Initial Alcohol Screen: US AUDIT-C     1  How often do you have a drink containing alcohol? 0 Filed at: 01/16/2022 1338   2  How many drinks containing alcohol do you have on a typical day you are drinking? 0 Filed at: 01/16/2022 1338   3a  Male UNDER 65: How often do you have five or more drinks on one occasion? 0 Filed at: 01/16/2022 1338   3b  FEMALE Any Age, or MALE 65+: How often do you have 4 or more drinks on one occassion? 0 Filed at: 01/16/2022 1338   Audit-C Score 0 Filed at: 01/16/2022 1334   MICHAEL: How many times in the past year have you    Used an illegal drug or used a prescription medication for non-medical reasons? Never Filed at: 01/16/2022 1338                    MDM  Number of Diagnoses or Management Options  Ambulatory dysfunction: new and requires workup  Right leg pain: new and requires workup  Diagnosis management comments: 79year old female presents to the emergency department for evaluation of right leg pain since Friday, ambulatory dysfunction  Vitals and medical record reviewed  Negative outpatient duplex and outpatient x-rays  Laboratory findings unremarkable  ED interpretation knee x-ray negative for acute osseous injury  On exam patient has tenderness and fullness in the right knee  Neurovascularly intact in the rt LE  No obvious effusion  Are 2 small bruises noted  No redness or warmth or concerning signs of infection  Tenderness behind the right knee  Discussed with Medicine due to patient's ambulatory dysfunction and living home alone  She was accepted to the medicine service    CT pending to rule out further etiologies       Amount and/or Complexity of Data Reviewed  Clinical lab tests: ordered and reviewed  Tests in the radiology section of CPT®: ordered and reviewed  Review and summarize past medical records: yes  Discuss the patient with other providers: yes  Independent visualization of images, tracings, or specimens: yes        Disposition  Final diagnoses:   Right leg pain   Ambulatory dysfunction     Time reflects when diagnosis was documented in both MDM as applicable and the Disposition within this note     Time User Action Codes Description Comment    1/16/2022  2:57 PM Cayla Alves Add [M25 561] Acute pain of right knee     1/16/2022  4:20 PM Crystal Sol Add [D06 135] Right leg pain     1/16/2022  4:20 PM Crystal Sol Add [R26 2] Ambulatory dysfunction       ED Disposition     ED Disposition Condition Date/Time Comment    Admit Stable Mireya Jan 16, 2022  4:20 PM Case was discussed with Dr Filiberto Rangel and the patient's admission status was agreed to be Admission Status: inpatient status to the service of Dr Filiberto Rangel   Follow-up Information    None         Patient's Medications   Discharge Prescriptions    No medications on file       No discharge procedures on file      PDMP Review     None          ED Provider  Electronically Signed by           Malia Mares PA-C  01/16/22 0865

## 2022-01-16 NOTE — ASSESSMENT & PLAN NOTE
Father called stating he wants the rx changed to [facility] on [address] instead of [facility]. Please advise.    rate controlled with Toprol-XL    Hold Coumadin in case knee needs to be aspirated

## 2022-01-17 VITALS
BODY MASS INDEX: 24.8 KG/M2 | DIASTOLIC BLOOD PRESSURE: 70 MMHG | HEIGHT: 63 IN | OXYGEN SATURATION: 99 % | HEART RATE: 70 BPM | WEIGHT: 140 LBS | RESPIRATION RATE: 20 BRPM | SYSTOLIC BLOOD PRESSURE: 151 MMHG | TEMPERATURE: 98.1 F

## 2022-01-17 LAB
ANION GAP SERPL CALCULATED.3IONS-SCNC: 8 MMOL/L (ref 4–13)
BUN SERPL-MCNC: 19 MG/DL (ref 5–25)
CALCIUM SERPL-MCNC: 8.7 MG/DL (ref 8.3–10.1)
CHLORIDE SERPL-SCNC: 107 MMOL/L (ref 100–108)
CO2 SERPL-SCNC: 26 MMOL/L (ref 21–32)
CREAT SERPL-MCNC: 1.18 MG/DL (ref 0.6–1.3)
ERYTHROCYTE [DISTWIDTH] IN BLOOD BY AUTOMATED COUNT: 13.9 % (ref 11.6–15.1)
GFR SERPL CREATININE-BSD FRML MDRD: 43 ML/MIN/1.73SQ M
GLUCOSE P FAST SERPL-MCNC: 84 MG/DL (ref 65–99)
GLUCOSE SERPL-MCNC: 84 MG/DL (ref 65–140)
HCT VFR BLD AUTO: 36.4 % (ref 34.8–46.1)
HGB BLD-MCNC: 12.1 G/DL (ref 11.5–15.4)
INR PPP: 1.54 (ref 0.84–1.19)
MCH RBC QN AUTO: 32.5 PG (ref 26.8–34.3)
MCHC RBC AUTO-ENTMCNC: 33.2 G/DL (ref 31.4–37.4)
MCV RBC AUTO: 98 FL (ref 82–98)
PLATELET # BLD AUTO: 261 THOUSANDS/UL (ref 149–390)
PMV BLD AUTO: 9.6 FL (ref 8.9–12.7)
POTASSIUM SERPL-SCNC: 4.1 MMOL/L (ref 3.5–5.3)
PROTHROMBIN TIME: 18.2 SECONDS (ref 11.6–14.5)
RBC # BLD AUTO: 3.72 MILLION/UL (ref 3.81–5.12)
SODIUM SERPL-SCNC: 141 MMOL/L (ref 136–145)
TSH SERPL DL<=0.05 MIU/L-ACNC: 2.33 UIU/ML (ref 0.36–3.74)
WBC # BLD AUTO: 6.45 THOUSAND/UL (ref 4.31–10.16)

## 2022-01-17 PROCEDURE — 36415 COLL VENOUS BLD VENIPUNCTURE: CPT | Performed by: FAMILY MEDICINE

## 2022-01-17 PROCEDURE — 99204 OFFICE O/P NEW MOD 45 MIN: CPT | Performed by: ORTHOPAEDIC SURGERY

## 2022-01-17 PROCEDURE — 97535 SELF CARE MNGMENT TRAINING: CPT

## 2022-01-17 PROCEDURE — 84443 ASSAY THYROID STIM HORMONE: CPT | Performed by: FAMILY MEDICINE

## 2022-01-17 PROCEDURE — 97167 OT EVAL HIGH COMPLEX 60 MIN: CPT

## 2022-01-17 PROCEDURE — 80048 BASIC METABOLIC PNL TOTAL CA: CPT | Performed by: FAMILY MEDICINE

## 2022-01-17 PROCEDURE — 99217 PR OBSERVATION CARE DISCHARGE MANAGEMENT: CPT | Performed by: FAMILY MEDICINE

## 2022-01-17 PROCEDURE — 85027 COMPLETE CBC AUTOMATED: CPT | Performed by: FAMILY MEDICINE

## 2022-01-17 PROCEDURE — 97163 PT EVAL HIGH COMPLEX 45 MIN: CPT

## 2022-01-17 PROCEDURE — 85610 PROTHROMBIN TIME: CPT | Performed by: FAMILY MEDICINE

## 2022-01-17 RX ORDER — ACETAMINOPHEN 325 MG/1
650 TABLET ORAL EVERY 6 HOURS SCHEDULED
Qty: 40 TABLET | Refills: 0 | Status: SHIPPED | OUTPATIENT
Start: 2022-01-17 | End: 2022-01-22

## 2022-01-17 RX ORDER — PREDNISONE 20 MG/1
TABLET ORAL
Qty: 15 TABLET | Refills: 0 | Status: SHIPPED | OUTPATIENT
Start: 2022-01-18 | End: 2022-01-28

## 2022-01-17 RX ADMIN — ACETAMINOPHEN 650 MG: 325 TABLET, FILM COATED ORAL at 06:45

## 2022-01-17 RX ADMIN — METOPROLOL SUCCINATE 37.5 MG: 25 TABLET, EXTENDED RELEASE ORAL at 08:55

## 2022-01-17 RX ADMIN — PREDNISONE 40 MG: 20 TABLET ORAL at 08:53

## 2022-01-17 RX ADMIN — ACETAMINOPHEN 650 MG: 325 TABLET, FILM COATED ORAL at 12:53

## 2022-01-17 RX ADMIN — ALPRAZOLAM 1 MG: 0.5 TABLET ORAL at 10:39

## 2022-01-17 RX ADMIN — ACETAMINOPHEN 650 MG: 325 TABLET, FILM COATED ORAL at 00:30

## 2022-01-17 RX ADMIN — SENNOSIDES A AND B 15 ML: 8.8 SYRUP ORAL at 08:52

## 2022-01-17 RX ADMIN — Medication 1 TABLET: at 08:15

## 2022-01-17 RX ADMIN — MAGNESIUM 64 MG (MAGNESIUM CHLORIDE) TABLET,DELAYED RELEASE 64 MG: at 08:52

## 2022-01-17 RX ADMIN — LOSARTAN POTASSIUM 50 MG: 50 TABLET, FILM COATED ORAL at 08:55

## 2022-01-17 RX ADMIN — FAMOTIDINE 20 MG: 20 TABLET, FILM COATED ORAL at 08:52

## 2022-01-17 NOTE — CASE MANAGEMENT
Case Management Discharge Planning Note    Patient name Laura Godwin  Location ED 10/ED 10 MRN [id number]  : 1940 Date 2022       Current Admission Date: 2022  Current Admission Diagnosis:Leg pain   Patient Active Problem List    Diagnosis Date Noted    Leg pain 2022    Chest discomfort 2021    Atrial fibrillation ([address] ) 2020    Essential hypertension 2020    Mixed hyperlipidemia 2020      LOS (days): 0  Geometric Mean LOS (GMLOS) (days):   Days to GMLOS:     OBJECTIVE:            Current admission status: Observation   Preferred Pharmacy:   [facility] [place] [facility] [address]  [address] [place] [address]  Phone: [phone] Fax: [phone]    Primary Care Provider: Bairon Mcmillan DO    Primary Insurance: MEDICARE  Secondary Insurance: [address] [place] [address] Drive:                 HHC has been recommended for pt at time of discharge  Pt does not have a preference to any HHC, CM placing referral to Critical access hospital, [facility]    Awaiting reply        [facility] has accepted pt       Requested  [address]         Is the patient interested in [facility] at discharge?: Yes  [address] requested[de-identified] [address] Name[de-identified] Other  [facility] Provider[de-identified] PCP  Home Health Services Needed[de-identified] Evaluate Functional Status and Safety,Gait/ADL Training,Strengthening/Theraputic Exercises to Improve Function,Other (comment) (med management)  Homebound Criteria Met[de-identified] Requires the Assistance of Another Person for Safe Ambulation or to Leave the Home  Supporting Clincal Findings[de-identified] Limited Endurance

## 2022-01-17 NOTE — DISCHARGE SUMMARY
[address]  Discharge- Silverton Fulling 1940, 79 y o  female MRN: [id number]  Unit/Bed#: ED 10 Encounter: [id number]  Primary Care Provider: Willa Abebe DO   Date and time admitted to hospital: 1/16/2022 12:55 PM    * Leg pain  Assessment & Plan  Patient is complaining of right knee pain for last 3-4 days which is progressively worsening to the point where she cannot stand or walk  Denies any falls  Was recently being treated for cellulitis with Keflex  Negative venous Doppler for DVT done a few days ago outpatient  Noted to have mild swelling of the right popliteal fossa and right knee  No redness or warmth noted  Will do CT right lower extremity to rule out effusion, Baker cyst etc  Will give 1 dose of Solu-Medrol 60 mg IV x1  Cont prednisone 40 mg daily for 5 days  Consult placed to orthopedics and PT OT  Placed on Tylenol for pain control and IV steroid shot given  Patient has side effects to narcotics and hence will try to avoid them  outpt fu with ortho home pt/ot    Mixed hyperlipidemia  Assessment & Plan  Crestor on hold    Essential hypertension  Assessment & Plan  Blood pressure elevated secondary to knee pain  Continue losartan and metoprolol    Atrial fibrillation (HCC)  Assessment & Plan   rate controlled with Toprol-XL  cont coumadin        Discharging Physician / Practitioner: Dustin Newell MD  PCP: Willa Abebe DO  Admission Date:   Admission Orders (From admission, onward)     Ordered        01/16/22 1506  Place in Observation  Once                      Discharge Date: 01/17/22    Medical Problems             Resolved Problems  Date Reviewed: 1/17/2022    None                Consultations During Hospital Stay:  · Ortho  · Pt/ot    Procedures Performed:   · none    Significant Findings / Test Results:   XR knee 1 or 2 views right    Result Date: 1/17/2022  Impression: No acute osseous abnormality  Periarticular calcification noted medially  Degenerative changes  Please see subsequent CT for further report Workstation performed: [id number]     CT lower extremity wo contrast right    Result Date: 1/16/2022  Impression: No acute osseous abnormality  Mild tricompartmental osteoarthritis of right knee with an 8 mm intra-articular loose body posterior to medial femoral condyle  Workstation performed: [id number]     Incidental Findings:   · none     Test Results Pending at Discharge (will require follow up):   · none     Outpatient Tests Requested:  · none    Complications:  none    Reason for Admission: right leg pain     Hospital Course: Claude Gums is a 79 y o  female patient who originally presented to the hospital on 1/16/2022 due to right leg pain   Noted to have mild swelling around the knee  VTE done outpatient was negative  CT leg show tricompartmental arthritis  Will place on a steroid course and home PT OT services and discharge home with outpatient follow-up with orthopedics        Please see above list of diagnoses and related plan for additional information  Condition at Discharge: good     Discharge Day Visit / Exam:     Subjective:  Patient denies any chest pain shortness of breath or abdominal pain  Still complaining of discomfort in her right knee  Vitals: Blood Pressure: 151/70 (01/17/22 0700)  Pulse: 70 (01/17/22 0700)  Temperature: 98 1 °F (36 7 °C) (01/17/22 0700)  Temp Source: Temporal (01/17/22 0700)  Respirations: 20 (01/17/22 0700)  Height: 5' 3" (160 cm) (01/16/22 1259)  Weight - Scale: 63 5 kg (140 lb) (01/16/22 1259)  SpO2: 99 % (01/17/22 0700)  Exam:   Physical Exam  Vitals and nursing note reviewed  Constitutional:       Appearance: Normal appearance  HENT:      Head: Normocephalic and atraumatic  Right Ear: External ear normal       Left Ear: External ear normal       Nose: Nose normal       Mouth/Throat:      Pharynx: Oropharynx is clear  Cardiovascular:      Rate and Rhythm: Normal rate and regular rhythm        Heart sounds: Normal heart sounds  Pulmonary:      Effort: Pulmonary effort is normal       Breath sounds: Normal breath sounds  Abdominal:      General: Bowel sounds are normal       Palpations: Abdomen is soft  Tenderness: There is no abdominal tenderness  Musculoskeletal:         General: Normal range of motion  Cervical back: Normal range of motion and neck supple  Comments: Mild swelling noted around the right knee   Skin:     General: Skin is warm and dry  Capillary Refill: Capillary refill takes less than 2 seconds  Neurological:      General: No focal deficit present  Mental Status: She is alert and oriented to person, place, and time  Psychiatric:         Mood and Affect: Mood normal          Discussion with Family:  Will update family    Discharge instructions/Information to patient and family:   See after visit summary for information provided to patient and family  Provisions for Follow-Up Care:  See after visit summary for information related to follow-up care and any pertinent home health orders  Disposition:     Home with VNA Services (Reminder: Complete face to face encounter)    For Discharges to [facility] SNF:   · Not Applicable to this Patient - Not Applicable to this Patient    Planned Readmission:  None     Discharge Statement:  I spent 35 minutes discharging the patient  This time was spent on the day of discharge  I had direct contact with the patient on the day of discharge  Greater than 50% of the total time was spent examining patient, answering all patient questions, arranging and discussing plan of care with patient as well as directly providing post-discharge instructions  Additional time then spent on discharge activities  Discharge Medications:  See after visit summary for reconciled discharge medications provided to patient and family        ** Please Note: This note has been constructed using a voice recognition system **

## 2022-01-17 NOTE — CONSULTS
Orthopedics   Laura Godwin 79 y o  female MRN: [id number]  Unit/Bed#: ED 10      Chief Complaint:   right knee pain    HPI:   79 y  o female presenting to the ED yesterday, complaining of right knee pain  The patient has a past medical history of atrial fibrillation and HTN  She states that on Saturday, 1/8/2022, she noted pain, swelling, and redness in her right lower extremity  The pain became so severe, she was unable to bear weight  When the symptoms continued, she was seen by her PCP, who sent her for testing on 1/14/2022  A venous doppler was negative for DVT, X-rays of the foot, ankle, and tib/fib negative for fracture or abnormality  The patient states that she was diagnosed with cellulitis, and placed on Keflex  The patient states that following the initial ED visit, her swelling and redness had resolved, though the pain continued  When asked where the pain is located, the patient points to the posterior aspect of the knee  She admits that the pain radiates down the calf and into the back of the heel/foot  The patient states that the pain worsens with movement of the knee and weight bearing  The pain does not radiate into the hip/lower back  She denies any numbness or tingling  She has never had pain in her knee before  The patient denies history of arthritis, baker's cyst, or injury  The patient admits that approximately a week before the pain began, she had walked about a mile and a half, which is unusual for her  The patient was admitted yesterday, and given one dose of IV solu-medrol, and this morning given oral prednisone by the [facility] team  The patient states that her pain remains unchanged  The patient lives at home alone, and typically is able to ambulate well with a walker       Review Of Systems:   · Skin: Normal  · Neuro: See HPI  · Musculoskeletal: See HPI  · 14 point review of systems negative except as stated above     Past Medical History:   Past Medical History:   Diagnosis Date    Atrial fibrillation ([address] )     Depression     Diverticular disease     GERD (gastroesophageal reflux disease)     Hyperlipidemia     Hypertension     Polyarthropathy        Past Surgical History:   Past Surgical History:   Procedure Laterality Date    APPENDECTOMY      CARDIAC CATHETERIZATION  1997    CHOLECYSTECTOMY      COLECTOMY      DISCECTOMY SPINE CERVICAL ANTERIOR      HYSTERECTOMY W/ SALPINGO-OOPHERECTOMY      LUMBAR LAMINECTOMY      OTHER SURGICAL HISTORY      spur removal    TONSILLECTOMY         Family History:  Family history reviewed and non-contributory  Family History   Problem Relation Age of Onset    Coronary artery disease Mother     Colon cancer Father     Coronary artery disease Son     No Known Problems Son        Social History:  Social History     Socioeconomic History    Marital status:      Spouse name: None    Number of children: None    Years of education: None    Highest education level: None   Occupational History    Occupation: [employer]   Tobacco Use    Smoking status: Never Smoker    Smokeless tobacco: Never Used   Vaping Use    Vaping Use: Never used   Substance and Sexual Activity    Alcohol use: Yes    Drug use: Never    Sexual activity: None     Comment: Defer   Other Topics Concern    None   Social History Narrative    None     Social Determinants of Health     Financial Resource Strain: Not on file   Food Insecurity: Not on file   Transportation Needs: Not on file   Physical Activity: Not on file   Stress: Not on file   Social Connections: Not on file   Intimate Partner Violence: Not on file   Housing Stability: Not on file       Allergies:    Allergies   Allergen Reactions    Metronidazole Anaphylaxis     Can Take Oral Only Had A Reaction To IV Flagyl      Adhesive  [Medical Tape]     Advil  [Ibuprofen]     Antihistamines, Loratadine-Type     Demerol [Meperidine]     Ketorolac Tromethamine     Lisinopril Cough    Pantoprazole severe reaction      Tolmetin            Labs:  0   Lab Value Date/Time    HCT 36 4 01/17/2022 0542    HCT 38 7 01/16/2022 1337    HCT 42 3 03/03/2021 0744    HGB 12 1 01/17/2022 0542    HGB 12 8 01/16/2022 1337    HGB 13 7 03/03/2021 0744    INR 1 54 (H) 01/17/2022 0542    WBC 6 45 01/17/2022 0542    WBC 9 93 01/16/2022 1337    WBC 10 30 (H) 03/03/2021 0744    ESR 24 01/16/2022 1337    CRP 0 8 01/16/2022 1337       Meds:    Current Facility-Administered Medications:     acetaminophen (TYLENOL) tablet 650 mg, 650 mg, Oral, Q6H [facility], Remo Nyhan, MD, 650 mg at 01/17/22 0645    ALPRAZolam (XANAX) tablet 1 mg, 1 mg, Oral, Daily PRN, Remo Nyhan, MD, 1 mg at 01/16/22 1847    calcium carbonate (TUMS) chewable tablet 1,000 mg, 1,000 mg, Oral, Daily PRN, Remo Nyhan, MD    calcium carbonate-vitamin D (OSCAL-D) 500 mg-200 units per tablet 1 tablet, 1 tablet, Oral, Daily With Breakfast, Remo Nyhan, MD, 1 tablet at 01/17/22 0815    famotidine (PEPCID) tablet 20 mg, 20 mg, Oral, BID, Remo Nyhan, MD, 20 mg at 01/17/22 0852    losartan (COZAAR) tablet 50 mg, 50 mg, Oral, Daily, Remo Nyhan, MD, 50 mg at 01/17/22 0855    magnesium chloride (MAG64) EC tablet TBEC 64 mg, 64 mg, Oral, Daily, Remo Nyhan, MD, 64 mg at 01/17/22 6950    meclizine (ANTIVERT) tablet 12 5 mg, 12 5 mg, Oral, Q8H PRN, Remo Nyhan, MD    metoprolol succinate (TOPROL-XL) 24 hr tablet 37 5 mg, 37 5 mg, Oral, Daily, Remo Nyhan, MD, 37 5 mg at 01/17/22 0855    multivitamin with iron-minerals liquid 15 mL, 15 mL, Oral, Daily, Remo Nyhan, MD, 15 mL at 01/17/22 6510    predniSONE tablet 40 mg, 40 mg, Oral, Daily, Remo Nyhan, MD, 40 mg at 01/17/22 0442    Current Outpatient Medications:     ALPRAZolam (XANAX) 1 mg tablet, Take 1 mg by mouth daily as needed  , Disp: , Rfl:     Calcium Carbonate-Vit D-Min (CALTRATE 600+D PLUS PO), Caltrate 600 plus D  1 PO QD, Disp: , Rfl:     Cholecalciferol (VITAMIN D3 PO), Take by mouth, Disp: , Rfl:    famotidine (PEPCID) 20 mg tablet, Take 20 mg by mouth 2 (two) times a day, Disp: , Rfl:     losartan (COZAAR) 50 mg tablet, Take 50 mg by mouth daily, Disp: , Rfl:     Magnesium 250 MG TABS, , Disp: , Rfl:     meclizine (ANTIVERT) 25 mg tablet, meclizine 25 mg tablet  take 1 tablet by mouth three times a day if needed for dizziness, Disp: , Rfl:     metoprolol succinate (TOPROL-XL) 25 mg 24 hr tablet, TAKE 1 5 TABLETS BY MOUTH DAILY, Disp: 135 tablet, Rfl: 3    Multiple Vitamins-Minerals (CENTRUM SILVER PO), Centrum Silver  1 PO QD, Disp: , Rfl:     Multiple Vitamins-Minerals (PRESERVISION AREDS 2 PO), Take by mouth, Disp: , Rfl:     rosuvastatin (CRESTOR) 10 MG tablet, Take 10 mg by mouth daily (Patient not taking: Reported on 11/18/2021 ), Disp: , Rfl:     warfarin (COUMADIN) 2 mg tablet, Take 2 mg by mouth daily , Disp: , Rfl:     Blood Culture:   No results found for: BLOODCX    Wound Culture:   No results found for: WOUNDCULT    Ins and Outs:  No intake/output data recorded  Physical Exam:   /70 (BP Location: Left arm)   Pulse 70   Temp 98 1 °F (36 7 °C) (Temporal)   Resp 20   Ht 5' 3" (1 6 m)   Wt 63 5 kg (140 lb)   SpO2 99%   BMI 24 80 kg/m²   Gen: Alert and oriented to person, place, time  HEENT: EOMI, eyes clear, moist mucus membranes, hearing intact  Respiratory: Bilateral chest rise  No audible wheezing found  Cardiovascular: Regular Rate and Rhythm  Abdomen: soft nontender/nondistended    Musculoskeletal: right lower extremity  · Skin intact  There is a small area of ecchymosis along the medial aspect of the knee  No lesions, erythema, swelling, effusion, or other signs of infection  · Mild tenderness to palpation along the medial and lateral joint lines  More significant palpable pain along the posterior knee  No cyst palpated     · Can perform straight leg raise, though has to support the knee with her hands due to pain   · Stable to varus/valgus stress, negative lachmans, posterior draw, negative Yudi's test  · Sensation intact L3-S1  · Active knee ROM from 5-90 degrees, with pain elicited in the back of the knee with both flexion and extension  · Full hip ROM without pain  · Intact ankle dorsi/plantar flexion, though the patient notes that there is pain that starts in the back of the heel that radiates up to the knee with dorsiflexion  · 2+ DP pulse  · Soft lower extremity compartments, negative Homans  · brisk cap refill in all toes    Radiology:   I personally reviewed the films  X-rays right knee taken on 1/16/2022 shows moderate tricompartmental osteoarthritis with osteophyte formation  No signs of fracture or dislocation  Enthesophytes at the quad and patellar tendons  CT of the lower right extremity taken on 1/16/2022 demonstrates the same tricompartmental osteoarthritis, as well as a loose body posterior to the medial femoral condyle  No evidence of baker's cyst    Reports of X-rays of the foot, ankle, and tib/fib taken [facility]14[facility] note no signs of fracture or dislocation, no abnormalities      _*_*_*_*_*_*_*_*_*_*_*_*_*_*_*_*_*_*_*_*_*_*_*_*_*_*_*_*_*_*_*_*_*_*_*_*_*_*_*_*_*    Assessment:  79 y o  female with right knee pain  History and exam consistent with a diagnosis of muscle strain vs knee osteoarthritis  No clinical evidence to suggest infection, fracture, or DVT  Plan:   · Weight bearing as tolerated  right lower extremity  · PT/OT for ROM, stretching, and strengthening  · Pain control per primary team  · Body mass index is 24 8 kg/m²     · Dispo: [id number] Marisa Rajput for discharge from ortho perspective  · Recommend PT/OT  · The patient may follow up with outpatient orthopedics if her problems persist      Evita Morales PA-C

## 2022-01-17 NOTE — OCCUPATIONAL THERAPY NOTE
Occupational Therapy Evaluation     Evaluation: 7582-5646  Treatment: 4822-9388    Patient Name: Oj Taylor Date: 1/17/2022  Problem List  Principal Problem:    Leg pain  Active Problems:    Atrial fibrillation ([facility] [address] )    Essential hypertension    Mixed hyperlipidemia    Past Medical History  Past Medical History:   Diagnosis Date    Atrial fibrillation ([facility] [address] )     Depression     Diverticular disease     GERD (gastroesophageal reflux disease)     Hyperlipidemia     Hypertension     Polyarthropathy      Past Surgical History  Past Surgical History:   Procedure Laterality Date    APPENDECTOMY      CARDIAC CATHETERIZATION  1997    CHOLECYSTECTOMY      COLECTOMY      DISCECTOMY SPINE CERVICAL ANTERIOR      HYSTERECTOMY W/ SALPINGO-OOPHERECTOMY      LUMBAR LAMINECTOMY      OTHER SURGICAL HISTORY      spur removal    TONSILLECTOMY             01/17/22 1345   Note Type   Note type Evaluation   Restrictions/Precautions   Weight Bearing Precautions Per Order Yes   RLE Weight Bearing Per Order WBAT   Other Precautions Fall Risk;Pain   Pain Assessment   Pain Assessment Tool FLACC   Pain Score 6   Pain Location/Orientation Orientation: Right;Location: Knee   Pain Rating: FLACC (Rest) - Face 0   Pain Rating: FLACC (Rest) - Legs 0   Pain Rating: FLACC (Rest) - Activity 0   Pain Rating: FLACC (Rest) - Cry 0   Pain Rating: FLACC (Rest) - Consolability 0   Score: FLACC (Rest) 0   Pain Rating: FLACC (Activity) - Face 1   Pain Rating: FLACC (Activity) - Legs 1   Pain Rating: FLACC (Activity) - Activity 0   Pain Rating: FLACC (Activity) - Cry 1   Pain Rating: FLACC (Activity) - Consolability 0   Score: FLACC (Activity) 3   Home Living   Type of Home Apartment  (6+6+6 feder[place] L HR)   Home Layout One level   Bathroom Shower/Tub Tub/shower unit   Bathroom Toilet Standard   Bathroom Equipment Grab bars in [facility] [address]  (did not use prior to onset of pain)   Additional Comments Pt reports living in a 3rd floor apartment with 6+6+6 BLAN[place] L HR  Prior to onset of pain Pt was not using AD for ambulation   Prior Function   Level of [place] Independent with ADLs and functional mobility   Lives With Alone   ADL Assistance Independent   IADLs Independent  (+ driving)   Falls in the last 6 months 0   Comments Pt reprots being completly independent with ADLs, IADLs, functional ambulation and community moiblity + driving   ADL   Where Assessed Edge of bed   Grooming Assistance 5  Supervision/Setup   Grooming Deficit Setup;Verbal cueing;Supervision/safety; Increased time to complete;Standing with assistive device   UB Dressing Assistance 6  Modified independent   UB Dressing Deficit Setup   LB Dressing Assistance   (SBA)   LB Dressing Deficit Requires assistive device for steadying;Supervision/safety;Verbal cueing; Increased time to complete; Don/doff R sock; Don/doff L sock; Thread LLE into pants; Thread RLE into pants;Pull up over hips   Toileting Assistance    (SBA)   Toileting Deficit Verbal cueing;Supervison/safety; Increased time to complete;Grab bar use;Clothing management up;Clothing management down;Perineal hygiene   Additional Comments Pt completing ADL tasks while seated at EOB  UB Dressing @ mod I after set-up   LB Dressing @ SBA including donnign socks/pants around feet and CM around waist  Please refer to treatment note for performance during toileting and grooming tasks   Bed Mobility   Supine to Sit 6  Modified independent   Additional items Increased time required;Verbal cues   Sit to Supine 6  Modified independent   Additional items Increased time required;Verbal cues   Additional Comments HOB flat, no bedrails   Transfers   Sit to Stand   (SBA)   Additional items Increased time required;Verbal cues   Stand to Sit   (SBA)   Additional items Increased time required;Verbal cues   Stand pivot   (SBA)   Additional items Increased time required;Verbal cues   Toilet transfer   (SBA)   Additional items Increased time required;Verbal cues;Standard toilet   Additional Comments Pt completing functional transfers with use of RW for UB support  vc'ing for hand placement and technique to decrease pain in R knee while completing transfers  Pt with good carryover  Balance   Static Sitting Normal   Dynamic Sitting Good   Static Standing Fair +   Dynamic Standing Fair   Activity Tolerance   Activity Tolerance Patient limited by pain   Medical Staff Made Aware Spoke with PT [facility] with Dr Aurora Gallego   Gross Motor Coordination Functional   Fine Motor Coordination Functional   Sensation   Light Touch No apparent deficits   Cognition   Overall Cognitive Status WFL   Arousal/Participation Alert; Responsive; Cooperative   Attention Within functional limits   Orientation Level Oriented X4   Memory Within functional limits   Following Commands Follows all commands and directions without difficulty   Assessment   Limitation Decreased ADL status; Decreased UE strength;Decreased Safe judgement during ADL;Decreased endurance;Decreased high-level ADLs; Decreased self-care trans   Prognosis Good   Assessment Pt is a 79 y o  female, admitted to [address] [place] [address] 1/16/2022 d/t experiencing increased R knee pain  Dx: leg pain  Pt was initially diagnosed with cellulitis and had a negative doppler however Pt's leg pain did not improve with Keflex  Imaging negative for acute abnormality at this time  Per Ortho consult, OK to bare weight on RLE as tolerate with no ROM limitations  Pt with PMHx impacting their performance during ADL tasks, including: a-fib, depression, diverticular disease, GERD, hyperlipidemia, HTN, polyarthropathy  Prior to admission to the hospital Pt was performing ADLs without physical assistance  IADLs without physical assistance  Functional transfers/ambulation without physical assistance  Cognitive status was PTA was intact   OT order placed to assess Pt's ADLs, cognitive status, and performance during functional tasks in order to maximize safety and independence while making most appropriate d/c recommendations  Pt's clinical presentation is currently unstable/unpredictable given new onset deficits that effect Pt's occupational performance and ability to safely return to PLOF including decrease activity tolerance, decrease standing balance, decrease safety awareness , increased pain, decrease generalized strength, decrease activity engagement, steps to enter home and high fall risk combined with medical complications of hypertension , pain impacting overall mobility status, abnormal CBC, edema/swelling and need for input for mobility technique/safety  Personal factors affecting Pt at time of initial evaluation include: step(s) to enter environment, availability as recommended, inability to perform current job functions, inability to perform IADLs, inability to perform ADLs, new need for AD, inability to ambulate household distances, inability to navigate community distances and limited insight into impairments  Pt will benefit from continued skilled OT services to address deficits as defined above and to maximize level independence/participation during ADLs and functional tasks to facilitate return toward PLOF and improved quality of life  From an occupational therapy standpoint, recommendation at time of d/c would be HHOT  Plan   Treatment Interventions ADL retraining;Functional transfer training;UE strengthening/ROM; Endurance training;Patient/family training;Equipment evaluation/education; Neuromuscular reeducation; Compensatory technique education;Continued evaluation; Energy conservation; Activityengagement   Goal Expiration Date 01/31/22   OT Treatment Day 1   OT Frequency 2-3x/wk   Additional Treatment Session   Start Time 0042   End Time 5036   Treatment Assessment Pt seen for treatment session #1 this date   Pt alert and agreeable to participate at this time  Pt completing short distance ambulation into restroom with use of RW for UB support @ SBA with increased time  Pt competing toileting tasks @ SBA including pericare, STS from toilet and CM around waist with UB supported from grab bar/RW PRN  Pt then standing at sinkside to complete hand hygiene @ S with increased time and vc'ing for technique pRN  Pt then retunring to bedside and completing sit>supine @ Mod I with increased time  At end of session, Pt supine in bed with bed alarm intact, call bell in reach and all needs met at is time    Additional Treatment Day 1   Recommendation   OT Discharge Recommendation Home with home health rehabilitation   AM-PAC Daily Activity Inpatient   Lower Body Dressing 3   Bathing 3   Toileting 3   Upper Body Dressing 4   Grooming 4   Eating 4   Daily Activity Raw Score 21   Daily Activity Standardized Score (Calc for Raw Score >=11) 44 27   AM-[facility] Applied Cognition Inpatient   Following a Speech/Presentation 4   Understanding Ordinary Conversation 4   Taking Medications 4   Remembering Where Things Are Placed or Put Away 4   Remembering List of 4-5 Errands 4   Taking Care of Complicated Tasks 4   Applied Cognition Raw Score 24   Applied Cognition Standardized Score 62 21     The patient's raw score on the AM-PAC Daily Activity inpatient short form is 21, standardized score is 44 27, greater than 39 4  Patients at this level are likely to benefit from DC to home  Please refer to the recommendation of the Occupational Therapist for safe DC planning  Pt goals to be met by 1/31/2022    1  Pt will demonstrate ability to complete LB dressing @ Mod I in order to increase safety and independence during meaningful tasks  2  Pt will demonstrate ability to mikey/doff socks/shoes while sitting EOB @ Mod I in order to increase safety and independence during meaningful tasks     3  Pt will demonstrate ability to complete toileting tasks including CM and pericare @ Mod I in order to increase safety and independence during meaningful tasks  4  Pt will demonstrate ability to complete EOB, chair, toilet/commode transfers @ Mod I in order to increase performance and participation during functional tasks  5  Pt will demonstrate ability to stand for 10+ minutes while maintaining G balance with use of least restrictive device for UB support PRN  6  Pt will demonstrate ability to tolerate 30-35 minute OT session with no vc'ing for deep breathing or use of energy conservation techniques in order to increase activity tolerance during functional tasks  7  Pt will demonstrate Good carryover of use of energy conservation/compensatory strategies during ADLs and functional tasks in order to increase safety and reduce risk for falls  8  Pt will demonstrate Good attention and participation in continued evaluation of functional ambulation house hold distances in order to assist with safe d/c planning  9  Pt will attend to continued cognitive assessments 100% of the time in order to provide most appropriate d/c recommendations  10  Pt will follow 100% simple 2-step commands and be A&O x4 consistently with environmental cues to increase participation in functional activities  11  Pt will identify 3 areas of interest/hobbies and 1 intervention on how to incorporate into daily life in order to increase interaction with environment and peers as well as increase participation in meaningful tasks  12  Pt will demonstrate 100% carryover of BUE HEP in order to increase BUE MS and increase performance during functional tasks upon d/c home      Violet VALENTINO/MITCHELL

## 2022-01-17 NOTE — PLAN OF CARE
Problem: OCCUPATIONAL THERAPY ADULT  Goal: Performs self-care activities at highest level of function for planned discharge setting  See evaluation for individualized goals  Description: Treatment Interventions: ADL retraining,Functional transfer training,UE strengthening/ROM,Endurance training,Patient/family training,Equipment evaluation/education,Neuromuscular reeducation,Compensatory technique education,Continued evaluation,Energy conservation,Activityengagement          See flowsheet documentation for full assessment, interventions and recommendations  Note: Limitation: Decreased ADL status,Decreased UE strength,Decreased Safe judgement during ADL,Decreased endurance,Decreased high-level ADLs,Decreased self-care trans  Prognosis: Good  Assessment: Pt is a 79 y o  female, admitted to [facility] 1/16/2022 d/t experiencing increased R knee pain  Dx: leg pain  Pt was initially diagnosed with cellulitis and had a negative doppler however Pt's leg pain did not improve with Keflex  Imaging negative for acute abnormality at this time  Per Ortho consult, OK to bare weight on RLE as tolerate with no ROM limitations  Pt with PMHx impacting their performance during ADL tasks, including: a-fib, depression, diverticular disease, GERD, hyperlipidemia, HTN, polyarthropathy  Prior to admission to the hospital Pt was performing ADLs without physical assistance  IADLs without physical assistance  Functional transfers/ambulation without physical assistance  Cognitive status was PTA was intact  OT order placed to assess Pt's ADLs, cognitive status, and performance during functional tasks in order to maximize safety and independence while making most appropriate d/c recommendations   Pt's clinical presentation is currently unstable/unpredictable given new onset deficits that effect Pt's occupational performance and ability to safely return to OF including decrease activity tolerance, decrease standing balance, decrease safety awareness , increased pain, decrease generalized strength, decrease activity engagement, steps to enter home and high fall risk combined with medical complications of hypertension , pain impacting overall mobility status, abnormal CBC, edema/swelling and need for input for mobility technique/safety  Personal factors affecting Pt at time of initial evaluation include: step(s) to enter environment, availability as recommended, inability to perform current job functions, inability to perform IADLs, inability to perform ADLs, new need for AD, inability to ambulate household distances, inability to navigate community distances and limited insight into impairments  Pt will benefit from continued skilled OT services to address deficits as defined above and to maximize level independence/participation during ADLs and functional tasks to facilitate return toward PLOF and improved quality of life  From an occupational therapy standpoint, recommendation at time of d/c would be HHOT       OT Discharge Recommendation: Home with home health rehabilitation

## 2022-01-17 NOTE — PHYSICAL THERAPY NOTE
PHYSICAL THERAPY EVALUATION  NAME:  Celso Lugo  DATE: 01/17/22    AGE:   79 y o  Mrn:   [id number]  ADMIT DX:  Leg pain [M79 [id number]]    Past Medical History:   Diagnosis Date    Atrial fibrillation ([address] )     Depression     Diverticular disease     GERD (gastroesophageal reflux disease)     Hyperlipidemia     Hypertension     Polyarthropathy      Length Of Stay: 0  Performed at least 2 patient identifiers during session: Name and Birthday  PHYSICAL THERAPY EVALUATION :      01/17/22 1344   PT Last Visit   PT Visit Date 01/17/22   Note Type   Note type Evaluation   Pain Assessment   Pain Assessment Tool 0-10   Pain Score 6   Pain Location/Orientation Orientation: Right;Location: Knee   Pain Rating: FLACC (Rest) - Face 1   Pain Rating: FLACC (Rest) - Legs 0   Pain Rating: FLACC (Rest) - Activity 0   Pain Rating: FLACC (Rest) - Cry 1   Pain Rating: FLACC (Rest) - Consolability 0   Score: FLACC (Rest) 2   Pain Rating: FLACC (Activity) - Face 1   Pain Rating: FLACC (Activity) - Legs 1   Pain Rating: FLACC (Activity) - Activity 1   Pain Rating: FLACC (Activity) - Cry 1   Pain Rating: FLACC (Activity) - Consolability 1   Score: FLACC (Activity) 5   Restrictions/Precautions   Weight Bearing Precautions Per Order Yes   RLE Weight Bearing Per Order WBAT   Other Precautions Fall Risk;Pain   Home Living   Type of Home Apartment  (6+6+6 L HR)   Home Layout One level   Bathroom Shower/Tub Tub/shower unit   Bathroom Toilet Standard   Bathroom Equipment Grab bars in shower  (pt believes she can hold onto this bar)   [address] [facility]  (only using past 2 days)   Additional Comments Reports living in a 3rd floor apartment with 6+ BLANCA with L HR and wasn't using an AD PTA except for the past 2 days she was using a RW      Prior Function   Level of [place] Independent with ADLs and functional mobility   Lives With Alone   Receives Help From Neighbor   ADL Assistance Independent   IADLs Independent  (+ driving)   Falls in the last 6 months 0   Comments Reports being independent with mobility, ADLs and IADLs without AD  Using RW for past 2 days  General   Additional Pertinent History Reports 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, 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  Cognition   Orientation Level Oriented X4   Following Commands Follows one step commands without difficulty   Subjective   Subjective "They told me I had cellulitis and started me on Keflex"   RLE Assessment   RLE Assessment X   RLE Overall AROM   R Hip Flexion just > 90 degrees limited by pain   R Hip ABduction WFL   R Hip ADduction WFL   R Knee Flexion just > 90 degrees limited by pain posterior knoee   R Knee Extension -15 degrees AROm  PROm to 0 degrees with inc pain noted   R Ankle Dorsiflexion WFL   R Ankle Plantar Flexion WFL   Strength RLE   RLE Overall Strength 3-/5   LLE Assessment   LLE Assessment WFL  (4/5)   Light Touch   RLE Light Touch Grossly intact   LLE Light Touch Grossly intact   Bed Mobility   Supine to Sit 6  Modified independent   Additional items Increased time required   Sit to Supine 6  Modified independent   Additional items Increased time required   Additional Comments HOB flat without bedrails  increased time ot complete  pt using UEs to assist R LE into bed  Transfers   Sit to Stand   (stand by assistance-->supervision)   Additional items Increased time required;Verbal cues   Stand to Sit   (stand by assistance-->supervision)   Additional items Increased time required;Verbal cues   Stand pivot   (stand by assistance-->supervision)   Additional items Increased time required;Verbal cues   Additional Comments educated pt on WBAT R LE recommendation from orthopedics  min cues for tehcnique and sequence  pt with decreased stance R LE initial stand  use of RW  sit<>stand with stand by assistance initially with min verbal cues for hand placement, R LE placement upon sitting with R LE extended anterior to COG   spt with RW with stand by assistance-->supervision wiht min cues for turning completely prior to sitting and min cues for use of RW throughout  antalgic with R LE stance wiht decreased step length  Ambulation/Elevation   Gait pattern Antalgic;Narrow BECKI; Decreased foot clearance; Excessively slow; Step to;Short stride   Gait Assistance   (stand by assistance-->supervision)   Additional items Verbal cues   Assistive Device Rolling walker   Distance 52'x1 and 14'x1 with RW with stand by assistance-->supervision with antalgic pattern with decreased stance R LE, decreased L step length, decreased R knee flexion on swing through  pt with increased ankle inversion on stance reporting this decreases her c/o pain in stance instead of foot flat  educated patient to not invert ankle as this could case sprain or fracture with icnreased WB and inversion  pt verbalized understanding  min cues for technique and sequence with ambulation  Stair Management Assistance   (stand by assistance)   Additional items Verbal cues; Increased time required   Stair Management Technique One rail L;Foreward; Step to pattern  (leading L LE up and R LE down)   Number of Stairs 15   Ambulation/Elevation Additional Comments B hands on left HR to ascend leading wiht L LE up and then B hands on R HR down descending with R LE  step to pattern  steadying assistance initally down, then stand by assistance  min cues for anterior wt ahft with descending  recommend assistance to complete stairs upon return to home  pt verbalized understanding  Balance   Static Sitting Normal   Dynamic Sitting Fair +   Static Standing Fair   Dynamic Standing Fair -   Ambulatory Fair -   Endurance Deficit   Endurance Deficit Yes   Endurance Deficit Description fatigue   Activity Tolerance   Activity Tolerance Patient limited by fatigue;Patient limited by pain   Medical Staff Made Aware [address]   Assessment   Prognosis Good   Problem List Decreased strength;Decreased endurance; Impaired balance;Decreased range of motion;Decreased mobility; Decreased safety awareness;Pain   Barriers to Discharge Decreased caregiver support   Barriers to Discharge Comments lives alone   Goals   Patient Goals "Go home"   STG Expiration Date 01/31/22   PT Treatment Day 0   Plan   Treatment/Interventions Functional transfer training;LE strengthening/ROM; Elevations; Therapeutic exercise; Endurance training;Patient/family training;Equipment eval/education; Bed mobility;Gait training; Compensatory technique education;Spoke to nursing;Spoke to case management;OT   PT Frequency 2-3x/wk   Recommendation   PT Discharge Recommendation Home with home health rehabilitation   Equipment Recommended   (walker-pt has)   Additional Comments discussed ice painful area R knee 10' every hour  discussed recommendation for HHPT  Pt verbalized understanding  AM-PAC Basic Mobility Inpatient   Turning in Bed Without Bedrails 4   Lying on Back to Sitting on Edge of Flat Bed 4   Moving Bed to Chair 3   Standing Up From Chair 3   Walk in Room 3   Climb 3-5 Stairs 3   Basic Mobility Inpatient Raw Score 20   Basic Mobility Standardized Score 43 99   Highest Level Of Mobility   JH-HLM Goal 6: Walk 10 steps or more   JH-HLM Highest Level of Mobility 7: Walk 25 feet or more   JH-HLM Goal Achieved Yes   End of Consult   Patient Position at End of Consult Supine; All needs within reach     (Please find full objective findings from PT assessment regarding body systems outlined above)  Assessment: Pt is a 79 y o  female seen for PT evaluation s/p admission to [address] [place] [address] on 1/16/2022 with Leg pain  Order placed for PT services    Upon evaluation: Pt is presenting with impaired functional mobility due to pain, decreased strength, decreased ROM, decreased endurance, impaired balance, gait deviations, decreased safety awareness, fall risk and LE edema requiring stand by to supervision assistance for transfers and stand by to supervision assistance for ambulation with RW  Pt's clinical presentation is currently unpredictable given the functional mobility deficits above, especially weakness, decreased ROM, edema of extremities, decreased endurance, gait deviations, pain, decreased activity tolerance, decreased functional mobility tolerance, decreased safety awareness and fatigue and pain limiting further ambulation, coupled with fall risks as indicated by AM-PAC 6-Clicks: 94/64 as well as impaired balance, polypharmacy and decreased safety awareness and combined with medical complications of pain impacting overall mobility status, abnormal CBC, fear/retreat and CT R LE showing Mild tricompartmental osteoarthritis of right knee with an 8 mm intra-articular loose body posterior to medial femoral condyle     Pt's PMHx and comorbidities that may affect physical performance and progress include: A fib, HTN and polyarthropathy, depression  Personal factors affecting pt at time of IE include: step(s) to enter environment, limited home support, inability to perform IADLs and inability to navigate community distances  Pt will benefit from continued skilled PT services to address deficits as defined above and to maximize level of functional mobility to facilitate return toward PLOF and improved QOL  From PT/mobility standpoint, recommendation at time of d/c would be Home PT, home with family support and with walker pending progress in order to reduce fall risk and maximize pt's functional independence and consistency with mobility in order to facilitate return to PLOF  Recommend ther ex next 1-2 sessions  The patient's AM-[facility] Basic Mobility Inpatient Short Form Raw Score is 20  A Raw score of greater than 16 suggests the patient may benefit from discharge to home  Please also refer to the recommendation of the Physical Therapist for safe discharge planning         Goals: Pt will: Perform bed mobility tasks with independent to reposition in bed and prepare for transfers  Pt will perform transfers with modified I to decrease burden of care, decrease risk for falls and improve activity tolerance and prepare for ambulation  Pt will ambulate with LRAD for >/= 250' with  modified I  to decrease burden of care, decrease risk for falls, improve activity tolerance and improve gait quality and to access home environment  Pt will complete >/= 18 steps with with unilateral handrail with modified I to return to home with BLANCA, decrease risk for falls and improve activity tolerance  Pt will participate in objective balance assessment to determine baseline fall risk  Pt will increase B LE strength >/= 1/2 MMT grade to facilitate functional mobility        Gery Councilman, PT,DPT

## 2022-01-17 NOTE — PLAN OF CARE
Problem: PHYSICAL THERAPY ADULT  Goal: Performs mobility at highest level of function for planned discharge setting  See evaluation for individualized goals  Description: Treatment/Interventions: Functional transfer training,LE strengthening/ROM,Elevations,Therapeutic exercise,Endurance training,Patient/family training,Equipment eval/education,Bed mobility,Gait training,Compensatory technique education,Spoke to nursing,Spoke to case management,OT  Equipment Recommended:  (walker-pt has)       See flowsheet documentation for full assessment, interventions and recommendations  Note: Prognosis: Good  Problem List: Decreased strength,Decreased endurance,Impaired balance,Decreased range of motion,Decreased mobility,Decreased safety awareness,Pain  Assessment:  Pt is a 79 y o  female seen for PT evaluation s/p admission to [address] [place] [address] on 1/16/2022 with Leg pain  Order placed for PT services    Upon evaluation: Pt is presenting with impaired functional mobility due to pain, decreased strength, decreased ROM, decreased endurance, impaired balance, gait deviations, decreased safety awareness, fall risk and LE edema requiring stand by to supervision assistance for transfers and stand by to supervision assistance for ambulation with RW  Pt's clinical presentation is currently unpredictable given the functional mobility deficits above, especially weakness, decreased ROM, edema of extremities, decreased endurance, gait deviations, pain, decreased activity tolerance, decreased functional mobility tolerance, decreased safety awareness and fatigue and pain limiting further ambulation, coupled with fall risks as indicated by AM-PAC 6-Clicks: 94/24 as well as impaired balance, polypharmacy and decreased safety awareness and combined with medical complications of pain impacting overall mobility status, abnormal CBC, fear/retreat and CT R LE showing Mild tricompartmental osteoarthritis of right knee with an 8 mm intra-articular loose body posterior to medial femoral condyle     Pt's PMHx and comorbidities that may affect physical performance and progress include: A fib, HTN and polyarthropathy, depression  Personal factors affecting pt at time of IE include: step(s) to enter environment, limited home support, inability to perform IADLs and inability to navigate community distances  Pt will benefit from continued skilled PT services to address deficits as defined above and to maximize level of functional mobility to facilitate return toward PLOF and improved QOL  From PT/mobility standpoint, recommendation at time of d/c would be Home PT, home with family support and with walker pending progress in order to reduce fall risk and maximize pt's functional independence and consistency with mobility in order to facilitate return to PLOF  Recommend ther ex next 1-2 sessions  Barriers to Discharge: Decreased caregiver support  Barriers to Discharge Comments: lives alone     PT Discharge Recommendation: Home with home health rehabilitation          See flowsheet documentation for full assessment

## 2022-01-17 NOTE — ASSESSMENT & PLAN NOTE
Patient is complaining of right knee pain for last 3-4 days which is progressively worsening to the point where she cannot stand or walk  Denies any falls  Was recently being treated for cellulitis with Keflex  Negative venous Doppler for DVT done a few days ago outpatient  Noted to have mild swelling of the right popliteal fossa and right knee  No redness or warmth noted  Will do CT right lower extremity to rule out effusion, Baker cyst etc  Will give 1 dose of Solu-Medrol 60 mg IV x1   Cont prednisone 40 mg daily for 5 days  Consult placed to orthopedics and PT OT  Placed on Tylenol for pain control and IV steroid shot given  Patient has side effects to narcotics and hence will try to avoid them  outpt fu with ortho home pt/ot

## 2022-03-21 NOTE — PROGRESS NOTES
Cardiology Office Visit    Sherrie Knox  [id number]  1940    [facility] [address]  [facility] [id number] [phone]      Dear Petey Mir DO,    I had the pleasure of seeing your patient at our [facility] office today 10/8/2021  As you know she is a pleasant  female with a medical history as described below  Reason for office visit: Follow up atrial fibrillation, hypertension and hyperlipidemia  1  Paroxysmal atrial fibrillation [facility])  Assessment & Plan:  Newly diagnosed atrial fibrillation 9/9/2020  ECG quality not great but appeared to be atrial fibrillation  Patient was started on anticoagulation with coumadin  CHADsVASc score  4  INR goal 2-3 (managed by PCP)  Discussed possible transition to [address] such as eliquis/pradaxa/xarelto  Cost prohibitive  She has noted a few episodes of epistaxis on coumadin  Continue metoprolol succinate 37 5 mg daily  She denies any significant palpitations since her last office visit (seem to be quiescent with increased dose of metoprolol succinate)  ECG 9/29/2020: Normal sinus rhythm  HR 67 bpm   Echocardiogram 10/27/2020 was unremarkable  Nuclear lexiscan stress test 10/27/2020 was normal   We discussed that if she has onset of palpitations she can take an extra 1/2 to 1 tablets of metoprolol succinate  14 day ZIO (9/30-10/14/2020) showed no atrial fibrillation but did show 79 episodes of SVT with the longest lasting 19 2 seconds as well as frequent PAC's (6 8%)  ? Atrial tachycardia on some strips  TSH was normal as was magnesium  We discussed adding magnesium 250 mg daily if she has recurrent palpitations (may be a good idea even if no palps)  Will need to screen for CONNOR  2  Essential hypertension  Assessment & Plan:  Blood pressure remains elevated on exam but improved since last office visit  148/72 on my personal recheck     Blood pressures at home are well controlled  We will continue to monitor for now  If blood pressure remains elevated likely will need to uptitrate losartan  3  Mixed hyperlipidemia  Assessment & Plan:  Lipid panel 9/9/2020: C 191  T 198  H 63  L 92  Patient is on rosuvastatin 10 mg daily  Will need repeat lipid panel at follow up  4  Chest discomfort  Assessment & Plan:  Patient reports episodes of waking up feeling heaviness on her chest   She feels fine the rest of the day and remains remarkably active  She also has noted increased fatigue  I would have a low threshold for stress test however she is somewhat hesitant at this point  I have recommended that we monitor symptoms closely and will arrange close follow-up here in the office with my CRNP  She understands that she will need additional testing if symptoms continue  I have asked her to call me  if she has symptoms of chest tightness, or left arm numbness/tingling, or increasing fatigue  HPI     Patient has history of atrial fibrillation, hyperlipidemia, hypertension, depression, diverticular disease, polyarthropathy and GERD     9/29/2020: (Initial office evaluation): Patient is very active and appears younger than stated age  She tells me that in mid august she started to notice shortness of breath and tightness in her chest especially while climbing her 16 steps into her apartment  She also felt her heart racing/fast  She was seen by her PCP at which time an ECG showed atrial fibrillation with a controlled heart rate  She was started on coumadin and metoprolol  She tells me that she developed epistaxis on coumadin and her dose was decreased  She tells me that since starting medications she has otherwise been feeling well  She did have one episode of palpitations while in [place] which lasted about 40 minutes then resolved   She does describe occasional chest tightness but upon further questioning this is more of a chest wall tenderness  Prior normal cardiac catheterization in 1997 after abnormal stress test  Occasional reflux symptoms  Two cups of coffee in am and was previously drinking a lot of green tea which she has since stopped  Occasional vertigo  ECG today NSR  TSH 9/9/2020 normal  Mg 2 2 mg/dL  Other labs below  11/3/2020:  Patient presents to the office today for follow-up  She tells me that she was seen by her optometrist in needs cataract surgery  She has had 2 episodes of epistaxis since her last office visit  She denies any overt chest pain  She denies any lightheadedness or dizziness  She has had palpitations intermittently  Patient did undergo ZIO monitor which showed 79 episodes of SVT with the longest lasting 19 2 seconds  Some of these episodes were noted to be possible atrial tachycardia with variable block  Frequent PACs (6 8%) and rare PVCs (< 1%)  I had also recommended echocardiogram and nuclear stress test   Echocardiogram 10/27/2020 was unremarkable  Raymondthomas Liu nuclear stress test 10/27/2020 showed no evidence of scar or ischemia  12/4/2020:  Patient returns to the office today for follow-up  I had increased her metoprolol succinate from 25 mg daily to 37 5 mg at her last office visit  She tells me that since that time she has been feeling great  She has had no again palpitations since the increase  She denies any chest pain  She denies any shortness of breath  She appears to be doing very well  She continues to have issues with her Coumadin levels but is resistant to change to one of the DOAC's due to cost        3/4/2021:  Patient presents to the office today for follow-up  She tells me that she is seeing Neurology needs to undergo an EMG  He is also followed up with Rheumatology Dr Omar Bella and needs to undergo an MRI  She reports that her legs feel rubbery  She tells me that on January 7th she woke up and her legs felt extremely rubbery at which time she stopped her statin  She did prednisone for a few days  She did do a trial off of her meds both metoprolol and losartan  She describes feeling pins and needles in her fingertips  Her legs overall feel weak  She denies any palpitations  Blood pressures have been well controlled at home      6/15/2021:  Patient presents to the office today for follow-up  During her workup of numbness/tingling in her hands she underwent MRI and was noted to have 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  She underwent 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 with subcutaneous fat packing on 05/06/2021 at [facility]  She did well with procedure  She denies any recurrent palpitations  Overall she is feeling well  She remains remarkably active  10/8/2021:  Patient presents to the office today for follow-up  She tells me that over the last week and a half she wakes up feeling heaviness on her chest   She feels well the rest of the day and only notes this 1st thing in the morning  She is taking Pepcid twice a day  She continues have been the needles in her left arm down to her fingers  She has noted increased fatigue over the last week or so as well  She denies any shortness of breath  She denies any palpitations  Patient Active Problem List   Diagnosis    Atrial fibrillation ([facility] [address] )    Essential hypertension    Mixed hyperlipidemia    Chest discomfort    Leg pain     Past Medical History:   Diagnosis Date    Arachnoid cyst     of the sella    Arthritis     Atrial fibrillation ([facility] [address] [facility]     Depression     Diverticular disease     GERD (gastroesophageal reflux disease)     Hyperlipidemia     Hypertension     Polyarthropathy      Social History     Socioeconomic History    Marital status:       Spouse name: Not on file    Number of children: Not on file    Years of education: Not on file    Highest education level: Not on file   Occupational History    Occupation: Retired Pharmacy Tech   Tobacco Use    Smoking status: Never Smoker    Smokeless tobacco: Never Used   Vaping Use    Vaping Use: Never used   Substance and Sexual Activity    Alcohol use:  Yes    Drug use: Never    Sexual activity: Not on file     Comment: Defer   Other Topics Concern    Not on file   Social History Narrative    Not on file     Social Determinants of Health     Financial Resource Strain: Not on file   Food Insecurity: Not on file   Transportation Needs: Not on file   Physical Activity: Not on file   Stress: Not on file   Social Connections: Not on file   Intimate Partner Violence: Not on file   Housing Stability: Not on file      Family History   Problem Relation Age of Onset    Coronary artery disease Mother     Colon cancer Father     Coronary artery disease Son     No Known Problems Son      Past Surgical History:   Procedure Laterality Date    APPENDECTOMY      CARDIAC CATHETERIZATION  1997    CHOLECYSTECTOMY      COLECTOMY      DISCECTOMY SPINE CERVICAL ANTERIOR      HYSTERECTOMY W/ SALPINGO-OOPHERECTOMY      LUMBAR LAMINECTOMY      OTHER SURGICAL HISTORY      spur removal    OTHER SURGICAL HISTORY  05/06/2021    Trans sphenoid decompression of arachnoid cyst of the sella with subcutaneous fat packing    TONSILLECTOMY         Current Outpatient Medications:     ALPRAZolam (XANAX) 1 mg tablet, Take 1 mg by mouth daily as needed  , Disp: , Rfl:     Calcium Carbonate-Vit D-Min (CALTRATE 600+D PLUS PO), Caltrate 600 plus D  1 PO QD, Disp: , Rfl:     Cholecalciferol (VITAMIN D3 PO), Take by mouth, Disp: , Rfl:     famotidine (PEPCID) 20 mg tablet, Take 20 mg by mouth 2 (two) times a day, Disp: , Rfl:     losartan (COZAAR) 50 mg tablet, Take 50 mg by mouth daily, Disp: , Rfl:     Magnesium 250 MG TABS, , Disp: , Rfl:     meclizine (ANTIVERT) 25 mg tablet, meclizine 25 mg tablet  take 1 tablet by mouth three times a day if needed for dizziness, Disp: , Rfl:     Multiple Vitamins-Minerals (CENTRUM SILVER PO), Centrum Silver  1 PO QD, Disp: , Rfl:     Multiple Vitamins-Minerals (PRESERVISION AREDS 2 PO), Take by mouth, Disp: , Rfl:     warfarin (COUMADIN) 2 mg tablet, Take 2 mg by mouth daily , Disp: , Rfl:     metoprolol succinate (TOPROL-XL) 25 mg 24 hr tablet, TAKE 1 5 TABLETS BY MOUTH DAILY, Disp: 135 tablet, Rfl: 3  Allergies   Allergen Reactions    Metronidazole Anaphylaxis     Can Take Oral Only Had A Reaction To IV Flagyl      Adhesive  [Medical Tape]     Advil  [Ibuprofen]     Antihistamines, Loratadine-Type     Demerol [Meperidine]     Ketorolac Tromethamine     Lisinopril Cough    Pantoprazole      severe reaction      Tolmetin        Cardiac Test Results:     Echocardiogram 10/27/2020:  Vigorous LV function with ejection fraction of 65-70%  Mild mitral regurgitation  Mild tricuspid regurgitation  Estimated PASP 25 mmHg  Mee Loose nuclear stress test 10/27/2020:   No evidence of myocardial scar or ischemia  EF 91%  ZIO 9/30-10/14/2020: 14 days:  Min HR 49  Max  (sinus)  Max  with SVT  Avg HR 65 bpm    79 episodes of SVT with longest lasting 19 2 seconds  Some SVT may be AT with variable block  Frequent PAC's (6 8%)  Rare PVC's (<1 0%)  ECG 9/29/2020: Normal sinus rhythm  Minimal voltage criteria for LVH which may be normal variant  ECG 9/9/2020: Atrial fibrillation with controlled ventricular response  Lipid panel 9/9/2020: C 191  T 198  H 63  L 92  Review of Systems:    Review of Systems   Constitutional: Negative for activity change, appetite change and fatigue  HENT: Negative for congestion, hearing loss, tinnitus and trouble swallowing  Eyes: Negative for visual disturbance  Respiratory: Negative for cough, chest tightness, shortness of breath and wheezing  Cardiovascular: Positive for chest pain (chest heaviness upon waking up) and palpitations  Negative for leg swelling     Gastrointestinal: Negative for abdominal distention, abdominal pain, nausea and vomiting  Reflux   Genitourinary: Negative for difficulty urinating  Musculoskeletal: Negative for arthralgias  Skin: Negative for rash  Neurological: Positive for dizziness (h/o vertigo)  Negative for syncope and light-headedness  Hematological: Does not bruise/bleed easily  Psychiatric/Behavioral: Negative for confusion  The patient is not nervous/anxious  All other systems reviewed and are negative  Vitals:    10/08/21 1432   BP: 154/62   Pulse: 92   SpO2: 92%   Weight: 65 9 kg (145 lb 3 2 oz)   Height: 5' 4" (1 626 m)     Vitals:    10/08/21 1432   Weight: 65 9 kg (145 lb 3 2 oz)     Height: 5' 4" (162 6 cm)     Body mass index is 24 92 kg/m²  Physical Exam  Constitutional:       Appearance: She is well-developed  Comments: Appears younger than stated age   HENT:      Head: Normocephalic and atraumatic  Eyes:      Conjunctiva/sclera: Conjunctivae normal       Pupils: Pupils are equal, round, and reactive to light  Neck:      Vascular: No JVD  Cardiovascular:      Rate and Rhythm: Normal rate and regular rhythm  Heart sounds: Normal heart sounds  No murmur heard  No friction rub  No gallop  Pulmonary:      Effort: Pulmonary effort is normal       Breath sounds: Normal breath sounds  Abdominal:      General: Bowel sounds are normal       Palpations: Abdomen is soft  Musculoskeletal:      Cervical back: Normal range of motion  Skin:     General: Skin is warm and dry  Neurological:      Mental Status: She is alert and oriented to person, place, and time     Psychiatric:         Behavior: Behavior normal

## 2022-04-18 ENCOUNTER — OFFICE VISIT (OUTPATIENT)
Dept: CARDIOLOGY CLINIC | Facility: CLINIC | Age: 82
End: 2022-04-18
Payer: MEDICARE

## 2022-04-18 VITALS
OXYGEN SATURATION: 98 % | TEMPERATURE: 97.6 F | SYSTOLIC BLOOD PRESSURE: 168 MMHG | HEART RATE: 73 BPM | BODY MASS INDEX: 25.55 KG/M2 | HEIGHT: 63 IN | WEIGHT: 144.2 LBS | DIASTOLIC BLOOD PRESSURE: 80 MMHG

## 2022-04-18 DIAGNOSIS — E78.2 MIXED HYPERLIPIDEMIA: ICD-10-CM

## 2022-04-18 DIAGNOSIS — I48.0 PAROXYSMAL ATRIAL FIBRILLATION (HCC): Primary | ICD-10-CM

## 2022-04-18 DIAGNOSIS — R07.89 CHEST DISCOMFORT: ICD-10-CM

## 2022-04-18 DIAGNOSIS — I10 ESSENTIAL HYPERTENSION: ICD-10-CM

## 2022-04-18 PROCEDURE — 99214 OFFICE O/P EST MOD 30 MIN: CPT | Performed by: STUDENT IN AN ORGANIZED HEALTH CARE EDUCATION/TRAINING PROGRAM

## 2022-04-18 NOTE — ASSESSMENT & PLAN NOTE
Statin held during recent admission for leg pain of unclear etiology  Deferred restarting today - can plan to repeat lipid panel at follow up and consider restarting

## 2022-04-18 NOTE — PROGRESS NOTES
Cardiology Follow Up    Regla Jorge  1940  [id number]  [facility]  [address]  [facility] [address] [phone]  [phone]    1  Paroxysmal atrial fibrillation (HCC)  Assessment & Plan:  Continue metoprolol succinate 37 5 daily   Continue coumadin dosed by INR      2  Essential hypertension  Assessment & Plan:  BP elevated today however very well controlled on detailed home logs with readings mostly 244B-939G systolic, with isolated readings up to 140s in setting of stress  Continue to monitor BP at home on current regimen losartan 50 mg and metoprolol succinate 37 5 XL daily      3  Mixed hyperlipidemia  Assessment & Plan:  Statin held during recent admission for leg pain of unclear etiology  Deferred restarting today - can plan to repeat lipid panel at follow up and consider restarting      4  Chest discomfort  Assessment & Plan:  Hx of chest discomfort occurring at night  Symptoms have resolved since prior visit          Ms Regla Jorge has a past medical history of atrial fibrillation on warfarin, HTN, HLD, GERD, and more recently 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      She initially established with Dr Caitlin Nova on 9/29/2020 for evaluation of shortness of breath and tightness in chest when climbing steps  She was also experiencing heart racing  ECG at PCP office revealed rate controlled a fib  She was started on Warfarin and Metoprolol at that time  Her ECG at the initial office visit showed normal sinus rhythm  ZIO monitoring, echocardiogram, and Lexiscan nuclear stress testing were ordered (full details below) with evidence of normal heart function and no scar or ischemia  At a visit 10/8/2021 she noted nocturnal chest discomfort, which was thought to be due to GERD  At her last visit 11/18/2021, she noted some improvement in symptoms  January 2022 she went for a walk while visiting [place] and she walked 1 7 miles   She had no pain during the walk but the next day developed severe R calf pain which persisted  Went to [facility] ER 1/16/2022 with persistent calf pain and remained in ER overnight awaiting a medicine bed  Duplex doppler ordered by primary care was negative for DVT  CT demonstrated arthritic changes without any acute changes    She was discharged home with home health  She is staying active at home despite some residual leg pain  No chest pain and no shortness of breath  She subsequently tested positive for COVID in March 2022 and had only minor symptoms  Medical Problems             Problem List     Atrial fibrillation [facility])    Essential hypertension    Mixed hyperlipidemia    Chest discomfort    Leg pain              Past Medical History:   Diagnosis Date    Arthritis     Atrial fibrillation ([facility] [address] )     Depression     Diverticular disease     GERD (gastroesophageal reflux disease)     Hyperlipidemia     Hypertension     Polyarthropathy      Social History     Socioeconomic History    Marital status:      Spouse name: Not on file    Number of children: Not on file    Years of education: Not on file    Highest education level: Not on file   Occupational History    Occupation: Retired Pharmacy Tech   Tobacco Use    Smoking status: Never Smoker    Smokeless tobacco: Never Used   Vaping Use    Vaping Use: Never used   Substance and Sexual Activity    Alcohol use:  Yes    Drug use: Never    Sexual activity: Not on file     Comment: Defer   Other Topics Concern    Not on file   Social History Narrative    Not on file     Social Determinants of Health     Financial Resource Strain: Not on file   Food Insecurity: Not on file   Transportation Needs: Not on file   Physical Activity: Not on file   Stress: Not on file   Social Connections: Not on file   Intimate Partner Violence: Not on file   Housing Stability: Not on file      Family History   Problem Relation Age of Onset    Coronary artery disease Mother     Colon cancer Father     Coronary artery disease Son     No Known Problems Son      Past Surgical History:   Procedure Laterality Date    APPENDECTOMY      CARDIAC CATHETERIZATION  1997    CHOLECYSTECTOMY      COLECTOMY      DISCECTOMY SPINE CERVICAL ANTERIOR      HYSTERECTOMY W/ SALPINGO-OOPHERECTOMY      LUMBAR LAMINECTOMY      OTHER SURGICAL HISTORY      spur removal    TONSILLECTOMY         Current Outpatient Medications:     ALPRAZolam (XANAX) 1 mg tablet, Take 1 mg by mouth daily as needed  , Disp: , Rfl:     Calcium Carbonate-Vit D-Min (CALTRATE 600+D PLUS PO), Caltrate 600 plus D  1 PO QD, Disp: , Rfl:     Cholecalciferol (VITAMIN D3 PO), Take by mouth, Disp: , Rfl:     famotidine (PEPCID) 20 mg tablet, Take 20 mg by mouth 2 (two) times a day, Disp: , Rfl:     losartan (COZAAR) 50 mg tablet, Take 50 mg by mouth daily, Disp: , Rfl:     Magnesium 250 MG TABS, , Disp: , Rfl:     meclizine (ANTIVERT) 25 mg tablet, meclizine 25 mg tablet  take 1 tablet by mouth three times a day if needed for dizziness, Disp: , Rfl:     metoprolol succinate (TOPROL-XL) 25 mg 24 hr tablet, TAKE 1 5 TABLETS BY MOUTH DAILY, Disp: 135 tablet, Rfl: 3    Multiple Vitamins-Minerals (CENTRUM SILVER PO), Centrum Silver  1 PO QD, Disp: , Rfl:     Multiple Vitamins-Minerals (PRESERVISION AREDS 2 PO), Take by mouth, Disp: , Rfl:     warfarin (COUMADIN) 2 mg tablet, Take 2 mg by mouth daily , Disp: , Rfl:   Allergies   Allergen Reactions    Metronidazole Anaphylaxis     Can Take Oral Only Had A Reaction To IV Flagyl      Adhesive  [Medical Tape]     Advil  [Ibuprofen]     Antihistamines, Loratadine-Type     Demerol [Meperidine]     Ketorolac Tromethamine     Lisinopril Cough    Pantoprazole      severe reaction      Tolmetin        Labs:     Chemistry        Component Value Date/Time    K 4 1 01/17/2022 0542     01/17/2022 0542    CO2 26 01/17/2022 0542    BUN 19 01/17/2022 0542    CREATININE 1 18 01/17/2022 0542 Component Value Date/Time    CALCIUM 8 7 01/17/2022 0542    ALKPHOS 81 03/03/2021 0744    AST 18 03/03/2021 0744    ALT 24 03/03/2021 0744            No results found for: CHOL  No results found for: HDL  No results found for: LDLCALC  No results found for: TRIG  No results found for: CHOLHDL    Prior Cardiac Workup:     Echocardiogram 10/27/2020:  Vigorous LV function with ejection fraction of 65-70%  Mild mitral regurgitation  Mild tricuspid regurgitation   Estimated PASP 25 mmHg       Lexiscan nuclear stress test 10/27/2020:   No evidence of myocardial scar or ischemia  EF 91%       ZIO 9/30-10/14/2020: 14 days:  Min HR 49  Max  (sinus)  Max  with SVT  Avg HR 65 bpm    79 episodes of SVT with longest lasting 19 2 seconds  Some SVT may be AT with variable block  Frequent PAC's (6 8%)  Rare PVC's (<1 0%)     ECG 9/29/2020: Normal sinus rhythm  Minimal voltage criteria for LVH which may be normal variant       ECG 9/9/2020: Atrial fibrillation with controlled ventricular response  Review of Systems   Cardiovascular: Negative for chest pain, irregular heartbeat, leg swelling and palpitations  Vitals:    04/18/22 1351   BP: 168/80   Pulse: 73   Temp: 97 6 °F (36 4 °C)   SpO2: 98%     Vitals:    04/18/22 1351   Weight: 65 4 kg (144 lb 3 2 oz)     Height: 5' 3" (160 cm)   Body mass index is 25 54 kg/m²  Physical Exam  Constitutional:       Appearance: Normal appearance  HENT:      Head: Normocephalic and atraumatic  Nose: Nose normal    Eyes:      General:         Right eye: No discharge  Left eye: No discharge  Cardiovascular:      Rate and Rhythm: Normal rate and regular rhythm  Pulses: Intact distal pulses  Pulmonary:      Effort: Pulmonary effort is normal       Breath sounds: Normal breath sounds  Abdominal:      Palpations: Abdomen is soft  Musculoskeletal:         General: No swelling  Cervical back: Neck supple     Skin:     General: Skin is warm  Neurological:      General: No focal deficit present  Mental Status: She is alert     Psychiatric:         Mood and Affect: Mood normal

## 2022-04-18 NOTE — ASSESSMENT & PLAN NOTE
BP elevated today however very well controlled on detailed home logs with readings mostly [id number]-903Q systolic, with isolated readings up to 140s in setting of stress     Continue to monitor BP at home on current regimen losartan 50 mg and metoprolol succinate 37 5 XL daily

## 2022-04-26 NOTE — ASSESSMENT & PLAN NOTE
Newly diagnosed atrial fibrillation 9/9/2020  ECG quality not great but appeared to be atrial fibrillation  Patient was started on anticoagulation with coumadin  CHADsVASc score  4  INR goal 2-3 (managed by PCP)  Discussed possible transition to [address] such as eliquis/pradaxa/xarelto  Cost prohibitive  She has noted a few episodes of epistaxis on coumadin  Continue metoprolol succinate 37 5 mg daily  She denies any significant palpitations since her last office visit (seem to be quiescent with increased dose of metoprolol succinate)  ECG 9/29/2020: Normal sinus rhythm  HR 67 bpm   Echocardiogram 10/27/2020 was unremarkable  Nuclear lexiscan stress test 10/27/2020 was normal   We discussed that if she has onset of palpitations she can take an extra 1/2 to 1 tablets of metoprolol succinate  14 day ZIO (9/30-10/14/2020) showed no atrial fibrillation but did show 79 episodes of SVT with the longest lasting 19 2 seconds as well as frequent PAC's (6 8%)  ? Atrial tachycardia on some strips  TSH was normal as was magnesium  We discussed adding magnesium 250 mg daily if she has recurrent palpitations (may be a good idea even if no palps)  Will need to screen for CONNOR

## 2022-04-26 NOTE — PROGRESS NOTES
Cardiology Office Visit    Onelia Araiza  [id number]  1940    [facility]  [address] [place] [address] [address]  [place] [address]  [phone]      Dear Fidencio Engle DO,    I had the pleasure of seeing your patient at our [address] [facility] [place]15/2021  As you know she is a pleasant  female with a medical history as described below  Reason for office visit: Follow up atrial fibrillation, hypertension and hyperlipidemia  1  Paroxysmal atrial fibrillation [facility])  Assessment & Plan:  Newly diagnosed atrial fibrillation 9/9/2020  ECG quality not great but appeared to be atrial fibrillation  Patient was started on anticoagulation with coumadin  CHADsVASc score  4  INR goal 2-3 (managed by PCP)  Discussed possible transition to [address] such as eliquis/pradaxa/xarelto  Cost prohibitive  She has noted a few episodes of epistaxis on coumadin  Continue metoprolol succinate 37 5 mg daily  She denies any significant palpitations since her last office visit (seem to be quiescent with increased dose of metoprolol succinate)  ECG 9/29/2020: Normal sinus rhythm  HR 67 bpm   Echocardiogram 10/27/2020 was unremarkable  Nuclear lexiscan stress test 10/27/2020 was normal   We discussed that if she has onset of palpitations she can take an extra 1/2 to 1 tablets of metoprolol succinate  14 day ZIO (9/30-10/14/2020) showed no atrial fibrillation but did show 79 episodes of SVT with the longest lasting 19 2 seconds as well as frequent PAC's (6 8%)  ? Atrial tachycardia on some strips  TSH was normal as was magnesium  We discussed adding magnesium 250 mg daily if she has recurrent palpitations (may be a good idea even if no palps)  Will need to screen for CONNOR  2  Essential hypertension  Assessment & Plan:  Blood pressure remains elevated on exam but improved since last office visit  148/80 on my personal recheck     Blood pressures at home are well controlled  We will continue to monitor for now  If blood pressure remains elevated likely will need to uptitrate losartan  3  Mixed hyperlipidemia  Assessment & Plan:  Lipid panel 9/9/2020: C 191  T 198  H 63  L 92  Patient is on rosuvastatin 10 mg daily  Repeat lipid panel 9/2021  HPI     Patient has history of atrial fibrillation, hyperlipidemia, hypertension, depression, diverticular disease, polyarthropathy and GERD     9/29/2020: (Initial office evaluation): Patient is very active and appears younger than stated age  She tells me that in mid august she started to notice shortness of breath and tightness in her chest especially while climbing her 16 steps into her apartment  She also felt her heart racing/fast  She was seen by her PCP at which time an ECG showed atrial fibrillation with a controlled heart rate  She was started on coumadin and metoprolol  She tells me that she developed epistaxis on coumadin and her dose was decreased  She tells me that since starting medications she has otherwise been feeling well  She did have one episode of palpitations while in [place] which lasted about 40 minutes then resolved  She does describe occasional chest tightness but upon further questioning this is more of a chest wall tenderness  Prior normal cardiac catheterization in 1997 after abnormal stress test  Occasional reflux symptoms  Two cups of coffee in am and was previously drinking a lot of green tea which she has since stopped  Occasional vertigo  ECG today NSR  TSH 9/9/2020 normal  Mg 2 2 mg/dL  Other labs below  11/3/2020:  Patient presents to the office today for follow-up  She tells me that she was seen by her optometrist in needs cataract surgery  She has had 2 episodes of epistaxis since her last office visit  She denies any overt chest pain  She denies any lightheadedness or dizziness  She has had palpitations intermittently    Patient did undergo ZIO monitor which showed 79 episodes of SVT with the longest lasting 19 2 seconds  Some of these episodes were noted to be possible atrial tachycardia with variable block  Frequent PACs (6 8%) and rare PVCs (< 1%)  I had also recommended echocardiogram and nuclear stress test   Echocardiogram 10/27/2020 was unremarkable  Lacie Shae nuclear stress test 10/27/2020 showed no evidence of scar or ischemia  12/4/2020:  Patient returns to the office today for follow-up  I had increased her metoprolol succinate from 25 mg daily to 37 5 mg at her last office visit  She tells me that since that time she has been feeling great  She has had no again palpitations since the increase  She denies any chest pain  She denies any shortness of breath  She appears to be doing very well  She continues to have issues with her Coumadin levels but is resistant to change to one of the DOAC's due to cost      3/4/2021:  Patient presents to the office today for follow-up  She tells me that she is seeing Neurology needs to undergo an EMG  He is also followed up with Rheumatology Dr Truong Coelho and needs to undergo an MRI  She reports that her legs feel rubbery  She tells me that on January 7th she woke up and her legs felt extremely rubbery at which time she stopped her statin  She did prednisone for a few days  She did do a trial off of her meds both metoprolol and losartan  She describes feeling pins and needles in her fingertips  Her legs overall feel weak  She denies any palpitations  Blood pressures have been well controlled at home  6/15/2021:  Patient presents to the office today for follow-up  During her workup of numbness/tingling in her hands she underwent MRI and was noted to have 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  She underwent 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 with subcutaneous fat packing on 05/06/2021 at [facility]    She did well with procedure  She denies any recurrent palpitations  Overall she is feeling well  She remains remarkably active  Patient Active Problem List   Diagnosis    Atrial fibrillation ([address] )    Essential hypertension    Mixed hyperlipidemia    Chest discomfort    Leg pain     Past Medical History:   Diagnosis Date    Arthritis     Atrial fibrillation ([address] )     Depression     Diverticular disease     GERD (gastroesophageal reflux disease)     Hyperlipidemia     Hypertension     Polyarthropathy      Social History     Socioeconomic History    Marital status:      Spouse name: Not on file    Number of children: Not on file    Years of education: Not on file    Highest education level: Not on file   Occupational History    Occupation: [employer]   Tobacco Use    Smoking status: Never Smoker    Smokeless tobacco: Never Used   Vaping Use    Vaping Use: Never used   Substance and Sexual Activity    Alcohol use:  Yes    Drug use: Never    Sexual activity: Not on file     Comment: Defer   Other Topics Concern    Not on file   Social History Narrative    Not on file     Social Determinants of Health     Financial Resource Strain: Not on file   Food Insecurity: Not on file   Transportation Needs: Not on file   Physical Activity: Not on file   Stress: Not on file   Social Connections: Not on file   Intimate Partner Violence: Not on file   Housing Stability: Not on file      Family History   Problem Relation Age of Onset    Coronary artery disease Mother     Colon cancer Father     Coronary artery disease Son     No Known Problems Son      Past Surgical History:   Procedure Laterality Date    APPENDECTOMY      CARDIAC CATHETERIZATION  1997    CHOLECYSTECTOMY      COLECTOMY      DISCECTOMY SPINE CERVICAL ANTERIOR      HYSTERECTOMY W/ SALPINGO-OOPHERECTOMY      LUMBAR LAMINECTOMY      OTHER SURGICAL HISTORY      spur removal    TONSILLECTOMY         * Current medications reviewed  Allergies   Allergen Reactions    Metronidazole Anaphylaxis     Can Take Oral Only Had A Reaction To IV Flagyl      Adhesive  [Medical Tape]     Advil  [Ibuprofen]     Antihistamines, Loratadine-Type     Demerol [Meperidine]     Ketorolac Tromethamine     Lisinopril Cough    Pantoprazole      severe reaction      Tolmetin        Cardiac Test Results:     Echocardiogram 10/27/2020:  Vigorous LV function with ejection fraction of 65-70%  Mild mitral regurgitation  Mild tricuspid regurgitation  Estimated PASP 25 mmHg  [facility] nuclear stress test 10/27/2020:   No evidence of myocardial scar or ischemia  EF 91%  ZIO 9/30-10/14/2020: 14 days:  Min HR 49  Max  (sinus)  Max  with SVT  Avg HR 65 bpm    79 episodes of SVT with longest lasting 19 2 seconds  Some SVT may be AT with variable block  Frequent PAC's (6 8%)  Rare PVC's (<1 0%)  ECG 9/29/2020: Normal sinus rhythm  Minimal voltage criteria for LVH which may be normal variant  ECG 9/9/2020: Atrial fibrillation with controlled ventricular response  Lipid panel 9/9/2020: C 191  T 198  H 63  L 92  Review of Systems:    Review of Systems   Constitutional: Negative for activity change, appetite change and fatigue  HENT: Negative for congestion, hearing loss, tinnitus and trouble swallowing  Eyes: Negative for visual disturbance  Respiratory: Negative for cough, chest tightness, shortness of breath and wheezing  Cardiovascular: Positive for palpitations (none recent)  Negative for chest pain and leg swelling  Gastrointestinal: Negative for abdominal distention, abdominal pain, nausea and vomiting  Reflux   Genitourinary: Negative for difficulty urinating  Musculoskeletal: Negative for arthralgias  Skin: Negative for rash  Neurological: Positive for dizziness (h/o vertigo) and numbness (and tingling in hands/fingers)  Negative for syncope and light-headedness     Hematological: Does not bruise/bleed easily  Psychiatric/Behavioral: Negative for confusion  The patient is not nervous/anxious  All other systems reviewed and are negative  Vitals:    06/15/21 1007 06/15/21 1038   BP: 142/70 148/80   BP Location:  Left arm   Pulse: 72    SpO2: 100%    Weight: 65 3 kg (144 lb)    Height: 5' 4" (1 626 m)      Vitals:    06/15/21 1007   Weight: 65 3 kg (144 lb)     Height: 5' 4" (162 6 cm)     Physical Exam  Constitutional:       Appearance: She is well-developed  Comments: Appears younger than stated age   HENT:      Head: Normocephalic and atraumatic  Eyes:      Conjunctiva/sclera: Conjunctivae normal       Pupils: Pupils are equal, round, and reactive to light  Neck:      Vascular: No JVD  Cardiovascular:      Rate and Rhythm: Normal rate and regular rhythm  Heart sounds: Normal heart sounds  No murmur heard  No friction rub  No gallop  Pulmonary:      Effort: Pulmonary effort is normal       Breath sounds: Normal breath sounds  Abdominal:      General: Bowel sounds are normal       Palpations: Abdomen is soft  Musculoskeletal:      Cervical back: Normal range of motion  Skin:     General: Skin is warm and dry  Neurological:      Mental Status: She is alert and oriented to person, place, and time     Psychiatric:         Behavior: Behavior normal

## 2022-04-26 NOTE — ASSESSMENT & PLAN NOTE
Blood pressure remains elevated on exam but improved since last office visit  148/72 on my personal recheck  Blood pressures at home are well controlled  We will continue to monitor for now  If blood pressure remains elevated likely will need to uptitrate losartan

## 2022-04-26 NOTE — ASSESSMENT & PLAN NOTE
Patient reports episodes of waking up feeling heaviness on her chest   She feels fine the rest of the day and remains remarkably active  She also has noted increased fatigue  I would have a low threshold for stress test however she is somewhat hesitant at this point  I have recommended that we monitor symptoms closely and will arrange close follow-up here in the office with my CRNP  She understands that she will need additional testing if symptoms continue  I have asked her to call me  if she has symptoms of chest tightness, or left arm numbness/tingling, or increasing fatigue

## 2022-04-26 NOTE — ASSESSMENT & PLAN NOTE
Blood pressure remains elevated on exam   160/80 on my personal recheck  Blood pressures at home are well controlled  We will continue to monitor for now  If blood pressure remains elevated likely will need to uptitrate losartan

## 2022-04-26 NOTE — ASSESSMENT & PLAN NOTE
Lipid panel 9/9/2020: C 191  T 198  H 63  L 92  Patient is on rosuvastatin 10 mg daily  Will need repeat lipid panel at follow up

## 2022-04-26 NOTE — ASSESSMENT & PLAN NOTE
Lipid panel 9/9/2020: C 191  T 198  H 63  L 92  Patient is on rosuvastatin 10 mg daily  Repeat lipid panel 9/2021

## 2022-04-26 NOTE — ASSESSMENT & PLAN NOTE
Newly diagnosed atrial fibrillation 9/9/2020  ECG quality not great but appeared to be atrial fibrillation  Patient was started on anticoagulation with coumadin  CHADsVASc score  4  INR goal 2-3 (managed by PCP)  Discussed possible transition to [address] such as eliquis/pradaxa/xarelto  She is concerned about cost    Will need to check cost at pharmacy to see if feasible  Has had a few episodes of epistaxis on coumadin  Continue metoprolol succinate 37 5 mg daily  She denies any significant palpitations since her last office visit  ECG 9/29/2020: Normal sinus rhythm  HR 67 bpm   Echocardiogram 10/27/2020 was unremarkable  Nuclear lexiscan stress test 10/27/2020 was normal   We discussed that if she has onset of palpitations she can take an extra 1/2 to 1 tablets of metoprolol succinate  14 day ZIO (9/30-10/14/2020) showed no atrial fibrillation but did show 79 episodes of SVT with the longest lasting 19 2 seconds as well as frequent PAC's (6 8%)  ? Atrial tachycardia on some strips  TSH was normal as was magnesium  We discussed adding magnesium 250 mg daily if she has recurrent palpitations (may be a good idea even if no palps)  Will need to screen for CONNOR

## 2022-04-26 NOTE — ASSESSMENT & PLAN NOTE
Blood pressure remains elevated on exam but improved since last office visit  148/80 on my personal recheck  Blood pressures at home are well controlled  We will continue to monitor for now  If blood pressure remains elevated likely will need to uptitrate losartan

## 2022-04-26 NOTE — ASSESSMENT & PLAN NOTE
Lipid panel 9/9/2020: C 191  T 198  H 63  L 92  Patient was on rosuvastatin 10 mg daily but stopped it when her legs started to feel 'rubbery'/weak  Repeat lipid panel 9/2021  I would like her to go back on statin therapy

## 2022-06-28 ENCOUNTER — OFFICE VISIT (OUTPATIENT)
Dept: CARDIOLOGY CLINIC | Facility: CLINIC | Age: 82
End: 2022-06-28
Payer: MEDICARE

## 2022-06-28 VITALS
WEIGHT: 142 LBS | HEIGHT: 63 IN | DIASTOLIC BLOOD PRESSURE: 66 MMHG | SYSTOLIC BLOOD PRESSURE: 150 MMHG | BODY MASS INDEX: 25.16 KG/M2 | HEART RATE: 77 BPM | OXYGEN SATURATION: 97 %

## 2022-06-28 DIAGNOSIS — M79.604 PAIN OF RIGHT LOWER EXTREMITY: ICD-10-CM

## 2022-06-28 DIAGNOSIS — E78.2 MIXED HYPERLIPIDEMIA: Primary | ICD-10-CM

## 2022-06-28 DIAGNOSIS — I10 ESSENTIAL HYPERTENSION: ICD-10-CM

## 2022-06-28 DIAGNOSIS — I48.0 PAROXYSMAL ATRIAL FIBRILLATION (HCC): ICD-10-CM

## 2022-06-28 PROCEDURE — 99214 OFFICE O/P EST MOD 30 MIN: CPT | Performed by: NURSE PRACTITIONER

## 2022-06-28 RX ORDER — ROSUVASTATIN CALCIUM 10 MG/1
10 TABLET, COATED ORAL DAILY
Qty: 30 TABLET | Refills: 11 | Status: SHIPPED | OUTPATIENT
Start: 2022-06-28 | End: 2022-10-14 | Stop reason: SDUPTHER

## 2022-06-28 RX ORDER — LATANOPROST 50 UG/ML
SOLUTION/ DROPS OPHTHALMIC
COMMUNITY
Start: 2022-06-12

## 2022-06-28 NOTE — PROGRESS NOTES
Cardiology Follow Up    [facility]  1940  [id number]  [facility] [place]  [address] [facility] [address]  [address] [facility] [address] [facility]  [phone]    Trecia Gottron presents today to discuss her hyperlipidemia  1  Mixed hyperlipidemia  Assessment & Plan:  Lipid panel 9/9/2020: C 191  T 198  H 63  L 92  Patient was on rosuvastatin 10 mg daily at that time  Rosuvastatin stopped in January 2022 due to right lower leg pain  Lipid panel through PCP office 6/14/2022: C 379  T 309  H 71  L 246  We discussed that given persistent leg symptoms and evidence on CT to indicate source of pain and swelling I do not place blame on the statin  I have asked her to resume her rosuvastatin 10 mg daily and monitor symptoms  She will contact me if any worsening/new symptoms  Plan for updated lipid panel within 8 weeks  Orders:  -     rosuvastatin (CRESTOR) 10 MG tablet; Take 1 tablet (10 mg total) by mouth daily  -     Lipid Panel with Direct LDL reflex; Future; Expected date: 08/22/2022  -     CK (with reflex to MB); Future; Expected date: 08/22/2022  -     Hepatic function panel; Future; Expected date: 08/22/2022    2  Pain of right lower extremity  Assessment & Plan:  Patient presented to [address] [place] [address] in January 2022 with right calf pain following a walk  DVT was ruled out  CT showed arthritis and a loose body in the knee  She was seen by ortho at that time with no further recommendations  Her statin was held  At this time her pain and swelling in the right knee and calf continue  Will resume statin as not likely culprit of symptoms  I have suggested follow up with orthopedics, which she will consider (does not wish to schedule at this time)  3  Paroxysmal atrial fibrillation [facility])  Assessment & Plan:  Atrial fibrillation diagnosed 9/9/2020  Echocardiogram 10/27/2020 was unremarkable    Nuclear lexiscan stress test 10/27/2020 was normal   14 day ZIO showed no atrial fibrillation but did show 79 episodes of SVT with the longest lasting 19 2 seconds as well as frequent PAC's (6 8%)  ? Atrial tachycardia on some strips  Declines sleep study  TSH WNL  Patient was started on anticoagulation with coumadin for CHADsVASc score  4  INR goal 2-3 (managed by PCP)  Discussed possible transition to [address] such as eliquis/pradaxa/xarelto, however she deferred secondary to cost   Currently in sinus rhythm, remains on metoprolol succinate 37 5mg daily  4  Essential hypertension  Assessment & Plan:  Blood pressure is elevated today, however she is anxious/worked up  Continue losartan 50mg daily, metoprolol succinate 37 5mg daily  Recent labs reviewed  She will monitor BP at home and contact me for BP persistently > 140/90  HPI  Niraj Barorso has a past medical history of atrial fibrillation, HTN, HLD, GERD, and more recently 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      She initially established with Dr Kelton Jimenez on 9/29/2020 for evaluation of shortness of breath and tightness in chest when climbing steps  She was also experiencing heart racing  ECG at PCP office revealed rate controlled a fib  She was started on warfarin and metoprolol at that time  Her ECG at 9/29/2020 office visit showed normal sinus rhythm  ZIO monitoring, echocardiogram, and Lexiscan nuclear stress testing were ordered (full details below) with evidence of normal heart function and no scar or ischemia  January 2022 she went for a walk while visiting family in [place] and she walked 1 7 miles  She had no pain during the walk but the next day developed severe R calf pain which persisted  She was evaluated at [address] [place] [address] ER 1/16/2022  Venous duplex was negative for DVT  CT of the right leg demonstrated arthritic changes with an 8 mm intra-articular loose body  She was evaluated by orthopedic, Dr Claritza Gregory with no further recommendations  Her rosuvastatin was placed on hold at that time       Niraj Barroso completed blood work through her PCP on 6/14/2022 with significantly elevated cholesterol: total cholesterol 379, triglycerides 309, HDL 71,   Her PCP instructed her to schedule an appointment to discuss alternate lipid lowering therapy  6/28/2022: Diane Thompson presents today to discuss her cholesterol  We reviewed the lab work done through her PCP indicating significantly elevated cholesterol off statin therapy  She has been off rosuvastatin since January  She tells me that her right knee pain never fully went away  She states she gets intermittent swelling in the knee and continues with pain in the knee and calf that wax and wane  She denies any cardiac complaints today  No chest pain, shortness of breath, lightheadedness, palpitations, or lower leg swelling/fluid retention  She is taking all other medications as prescribed  She will be undergoing updated imaging of her pituitary and is anxious about this  Medical Problems     Problem List     Atrial fibrillation [facility])    Essential hypertension    Mixed hyperlipidemia    Chest discomfort    Leg pain        Past Medical History:   Diagnosis Date    Arachnoid cyst     of the sella    Arthritis     Atrial fibrillation ([address] )     Cataract     Depression     Diverticular disease     GERD (gastroesophageal reflux disease)     Glaucoma     Hyperlipidemia     Hypertension     Polyarthropathy      Social History     Socioeconomic History    Marital status:      Spouse name: Not on file    Number of children: Not on file    Years of education: Not on file    Highest education level: Not on file   Occupational History    Occupation: Retired Pharmacy Tech   Tobacco Use    Smoking status: Never Smoker    Smokeless tobacco: Never Used   Vaping Use    Vaping Use: Never used   Substance and Sexual Activity    Alcohol use:  Yes    Drug use: Never    Sexual activity: Not on file     Comment: Defer   Other Topics Concern    Not on file   Social History Narrative    Not on file     Social Determinants of Health     Financial Resource Strain: Not on file   Food Insecurity: Not on file   Transportation Needs: Not on file   Physical Activity: Not on file   Stress: Not on file   Social Connections: Not on file   Intimate Partner Violence: Not on file   Housing Stability: Not on file      Family History   Problem Relation Age of Onset    Coronary artery disease Mother     Colon cancer Father     Coronary artery disease Son     No Known Problems Son      Past Surgical History:   Procedure Laterality Date    APPENDECTOMY      CARDIAC CATHETERIZATION  1997    CHOLECYSTECTOMY      COLECTOMY      DISCECTOMY SPINE CERVICAL ANTERIOR      HYSTERECTOMY W/ SALPINGO-OOPHERECTOMY      LUMBAR LAMINECTOMY      OTHER SURGICAL HISTORY      spur removal    OTHER SURGICAL HISTORY  05/06/2021    Trans sphenoid decompression of arachnoid cyst of the sella with subcutaneous fat packing    TONSILLECTOMY         Current Outpatient Medications:     ALPRAZolam (XANAX) 1 mg tablet, Take 1 mg by mouth daily as needed  , Disp: , Rfl:     Calcium Carbonate-Vit D-Min (CALTRATE 600+D PLUS PO), Caltrate 600 plus D  1 PO QD, Disp: , Rfl:     Cholecalciferol (VITAMIN D3 PO), Take by mouth, Disp: , Rfl:     famotidine (PEPCID) 20 mg tablet, Take 20 mg by mouth 2 (two) times a day, Disp: , Rfl:     latanoprost (XALATAN) 0 005 % ophthalmic solution, INSTILL 1 DROP INTO BOTH EYES IN THE EVENING, Disp: , Rfl:     losartan (COZAAR) 50 mg tablet, Take 50 mg by mouth daily, Disp: , Rfl:     Magnesium 250 MG TABS, , Disp: , Rfl:     meclizine (ANTIVERT) 25 mg tablet, meclizine 25 mg tablet  take 1 tablet by mouth three times a day if needed for dizziness, Disp: , Rfl:     metoprolol succinate (TOPROL-XL) 25 mg 24 hr tablet, TAKE 1 5 TABLETS BY MOUTH DAILY, Disp: 135 tablet, Rfl: 3    Multiple Vitamins-Minerals (CENTRUM SILVER PO), Centrum Silver  1 PO QD, Disp: , Rfl:     Multiple Vitamins-Minerals (PRESERVISION AREDS 2 PO), Take by mouth, Disp: , Rfl:     rosuvastatin (CRESTOR) 10 MG tablet, Take 1 tablet (10 mg total) by mouth daily, Disp: 30 tablet, Rfl: 11    warfarin (COUMADIN) 2 mg tablet, Take 2 mg by mouth daily , Disp: , Rfl:   Allergies   Allergen Reactions    Metronidazole Anaphylaxis     Can Take Oral Only Had A Reaction To IV Flagyl      Adhesive  [Medical Tape]     Advil  [Ibuprofen]     Antihistamines, Loratadine-Type     Demerol [Meperidine]     Ketorolac Tromethamine     Lisinopril Cough    Pantoprazole      severe reaction      Tolmetin        Labs:     Chemistry        Component Value Date/Time    K 4 1 01/17/2022 0542     01/17/2022 0542    CO2 26 01/17/2022 0542    BUN 19 01/17/2022 0542    CREATININE 1 18 01/17/2022 0542        Component Value Date/Time    CALCIUM 8 7 01/17/2022 0542    ALKPHOS 81 03/03/2021 0744    AST 18 03/03/2021 0744    ALT 24 03/03/2021 0744        Test Results:      Echocardiogram 10/27/2020:  Vigorous LV function with ejection fraction of 65-70%  Mild mitral regurgitation  Mild tricuspid regurgitation   Estimated PASP 25 mmHg       Lexiscan nuclear stress test 10/27/2020:   No evidence of myocardial scar or ischemia  EF 91%       ZIO 9/30-10/14/2020: 14 days:  Min HR 49  Max  (sinus)  Max  with SVT  Avg HR 65 bpm    79 episodes of SVT with longest lasting 19 2 seconds  Some SVT may be AT with variable block  Frequent PAC's (6 8%)  Rare PVC's (<1 0%)     ECG 9/29/2020: Normal sinus rhythm  Minimal voltage criteria for LVH which may be normal variant       ECG 9/9/2020: Atrial fibrillation with controlled ventricular response  Review of Systems   Constitutional: Negative  HENT: Negative  Cardiovascular: Negative for chest pain, dyspnea on exertion, irregular heartbeat, leg swelling, near-syncope, orthopnea and palpitations  Respiratory: Negative for cough and snoring  Endocrine: Negative  Skin: Negative  Musculoskeletal: Positive for arthritis, joint pain and joint swelling  Gastrointestinal: Negative  Genitourinary: Negative  Neurological: Negative  Psychiatric/Behavioral: Negative  Vitals:    06/28/22 0850   BP: 150/66   Pulse: 77   SpO2: 97%     Vitals:    06/28/22 0850   Weight: 64 4 kg (142 lb)     Height: 5' 3" (160 cm)   Body mass index is 25 15 kg/m²  Physical Exam  Vitals and nursing note reviewed  Constitutional:       General: She is not in acute distress  Appearance: She is well-developed  She is not diaphoretic  HENT:      Head: Normocephalic and atraumatic  Neck:      Thyroid: No thyromegaly  Vascular: No carotid bruit or JVD  Cardiovascular:      Rate and Rhythm: Normal rate and regular rhythm  No extrasystoles are present  Pulses: Intact distal pulses  Radial pulses are 2+ on the right side and 2+ on the left side  Heart sounds: Normal heart sounds, S1 normal and S2 normal  No murmur heard  Comments: No lower extremity edema  Pulmonary:      Effort: Pulmonary effort is normal       Breath sounds: Normal breath sounds  Abdominal:      General: There is no distension  Palpations: Abdomen is soft  Tenderness: There is no abdominal tenderness  Musculoskeletal:         General: Normal range of motion  Cervical back: Normal range of motion and neck supple  Lymphadenopathy:      Cervical: No cervical adenopathy  Skin:     General: Skin is warm and dry  Neurological:      Mental Status: She is alert and oriented to person, place, and time  Cranial Nerves: No cranial nerve deficit  Psychiatric:         Mood and Affect: Mood and affect normal          Speech: Speech normal          Behavior: Behavior normal  Behavior is cooperative           Cognition and Memory: Cognition and memory normal

## 2022-06-28 NOTE — PATIENT INSTRUCTIONS
Given that your knee pain/swelling continues despite being off rosuvastatin for 6 months I do not blame the statin  Your CT does show arthritis and possible loose body in the right knee, which could cause pain and swelling  I recommend resuming rosuvastatin 10 mg daily  Notify me if worsening pain  Lab work in 8 weeks - order provided  I recommend an appointment with orthopedic, Dr Tracy Aragon, in our building  You can call to schedule at your convenience

## 2022-07-03 NOTE — ASSESSMENT & PLAN NOTE
Lipid panel 9/9/2020: C 191  T 198  H 63  L 92  Patient was on rosuvastatin 10 mg daily at that time  Rosuvastatin stopped in January 2022 due to right lower leg pain  Lipid panel through PCP office 6/14/2022: C 379  T 309  H 71  L 246  We discussed that given persistent leg symptoms and evidence on CT to indicate source of pain and swelling I do not place blame on the statin  I have asked her to resume her rosuvastatin 10 mg daily and monitor symptoms  She will contact me if any worsening/new symptoms  Plan for updated lipid panel within 8 weeks

## 2022-07-03 NOTE — ASSESSMENT & PLAN NOTE
Atrial fibrillation diagnosed 9/9/2020  Echocardiogram 10/27/2020 was unremarkable  Nuclear lexiscan stress test 10/27/2020 was normal   14 day ZIO showed no atrial fibrillation but did show 79 episodes of SVT with the longest lasting 19 2 seconds as well as frequent PAC's (6 8%)  ? Atrial tachycardia on some strips  Declines sleep study  TSH WNL  Patient was started on anticoagulation with coumadin for CHADsVASc score  4  INR goal 2-3 (managed by PCP)  Discussed possible transition to [address] [place], [place] [address] [address] such as eliquis/pradaxa/xarelto, however she deferred secondary to cost   Currently in sinus rhythm, remains on metoprolol succinate 37 5mg daily

## 2022-07-03 NOTE — ASSESSMENT & PLAN NOTE
Patient presented to [address] [place] [address] ER in January 2022 with right calf pain following a walk  DVT was ruled out  CT showed arthritis and a loose body in the knee  She was seen by ortho at that time with no further recommendations  Her statin was held  At this time her pain and swelling in the right knee and calf continue  Will resume statin as not likely culprit of symptoms  I have suggested follow up with orthopedics, which she will consider (does not wish to schedule at this time)

## 2022-07-03 NOTE — ASSESSMENT & PLAN NOTE
Blood pressure is elevated today, however she is anxious/worked up  Continue losartan 50mg daily, metoprolol succinate 37 5mg daily  Recent labs reviewed  She will monitor BP at home and contact me for BP persistently > 140/90

## 2022-07-19 ENCOUNTER — TELEPHONE (OUTPATIENT)
Dept: OTHER | Facility: OTHER | Age: 82
End: 2022-07-19

## 2022-07-19 NOTE — TELEPHONE ENCOUNTER
Yoly with [facility] requesting patient's last visit notes and an EKG tracing if available    Fax to [phone]

## 2022-08-22 ENCOUNTER — APPOINTMENT (OUTPATIENT)
Dept: LAB | Facility: HOSPITAL | Age: 82
End: 2022-08-22
Payer: MEDICARE

## 2022-08-22 DIAGNOSIS — E78.2 MIXED HYPERLIPIDEMIA: ICD-10-CM

## 2022-08-22 LAB
ALBUMIN SERPL BCP-MCNC: 3.7 G/DL (ref 3.5–5)
ALP SERPL-CCNC: 74 U/L (ref 46–116)
ALT SERPL W P-5'-P-CCNC: 23 U/L (ref 12–78)
AST SERPL W P-5'-P-CCNC: 19 U/L (ref 5–45)
BILIRUB DIRECT SERPL-MCNC: 0.08 MG/DL (ref 0–0.2)
BILIRUB SERPL-MCNC: 0.58 MG/DL (ref 0.2–1)
CHOLEST SERPL-MCNC: 200 MG/DL
CK SERPL-CCNC: 46 U/L (ref 26–192)
HDLC SERPL-MCNC: 68 MG/DL
LDLC SERPL CALC-MCNC: 111 MG/DL (ref 0–100)
PROT SERPL-MCNC: 7 G/DL (ref 6.4–8.4)
TRIGL SERPL-MCNC: 104 MG/DL

## 2022-08-22 PROCEDURE — 80076 HEPATIC FUNCTION PANEL: CPT

## 2022-08-22 PROCEDURE — 80061 LIPID PANEL: CPT

## 2022-08-22 PROCEDURE — 36415 COLL VENOUS BLD VENIPUNCTURE: CPT

## 2022-08-22 PROCEDURE — 82550 ASSAY OF CK (CPK): CPT

## 2022-08-23 ENCOUNTER — TELEPHONE (OUTPATIENT)
Dept: CARDIOLOGY CLINIC | Facility: CLINIC | Age: 82
End: 2022-08-23

## 2022-08-23 NOTE — TELEPHONE ENCOUNTER
----- Message from Joyce Dhillon sent at 8/23/2022  1:33 PM EDT -----  Please let patient know that her cholesterol improved significantly with resuming rosuvastatin 10 mg daily  Her liver panel and muscle test are normal  Please continue medication  Thank you!

## 2022-10-14 DIAGNOSIS — E78.2 MIXED HYPERLIPIDEMIA: ICD-10-CM

## 2022-10-14 RX ORDER — ROSUVASTATIN CALCIUM 10 MG/1
10 TABLET, COATED ORAL DAILY
Qty: 90 TABLET | Refills: 3 | Status: SHIPPED | OUTPATIENT
Start: 2022-10-14

## 2022-10-14 NOTE — TELEPHONE ENCOUNTER
Received fax from pharmacy that patient needs refill on rosuvastatin calcium 10 mg tab, one tablet every day

## 2022-11-25 DIAGNOSIS — E78.2 MIXED HYPERLIPIDEMIA: ICD-10-CM

## 2022-11-25 DIAGNOSIS — I10 ESSENTIAL HYPERTENSION: ICD-10-CM

## 2022-11-25 DIAGNOSIS — I48.0 PAROXYSMAL ATRIAL FIBRILLATION (HCC): ICD-10-CM

## 2022-11-25 RX ORDER — METOPROLOL SUCCINATE 25 MG/1
37.5 TABLET, EXTENDED RELEASE ORAL DAILY
Qty: 135 TABLET | Refills: 3 | Status: SHIPPED | OUTPATIENT
Start: 2022-11-25

## 2023-01-30 ENCOUNTER — TELEPHONE (OUTPATIENT)
Dept: CARDIOLOGY CLINIC | Facility: CLINIC | Age: 83
End: 2023-01-30

## 2023-01-30 ENCOUNTER — APPOINTMENT (OUTPATIENT)
Dept: LAB | Facility: HOSPITAL | Age: 83
End: 2023-01-30

## 2023-01-30 ENCOUNTER — CLINICAL SUPPORT (OUTPATIENT)
Dept: CARDIOLOGY CLINIC | Facility: CLINIC | Age: 83
End: 2023-01-30

## 2023-01-30 VITALS — SYSTOLIC BLOOD PRESSURE: 182 MMHG | DIASTOLIC BLOOD PRESSURE: 80 MMHG | HEART RATE: 93 BPM | OXYGEN SATURATION: 98 %

## 2023-01-30 DIAGNOSIS — I48.0 PAROXYSMAL ATRIAL FIBRILLATION (HCC): Primary | ICD-10-CM

## 2023-01-30 DIAGNOSIS — I48.0 PAROXYSMAL ATRIAL FIBRILLATION (HCC): ICD-10-CM

## 2023-01-30 LAB
ANION GAP SERPL CALCULATED.3IONS-SCNC: 9 MMOL/L (ref 4–13)
BUN SERPL-MCNC: 22 MG/DL (ref 5–25)
CALCIUM SERPL-MCNC: 9.8 MG/DL (ref 8.4–10.2)
CHLORIDE SERPL-SCNC: 104 MMOL/L (ref 96–108)
CO2 SERPL-SCNC: 27 MMOL/L (ref 21–32)
CREAT SERPL-MCNC: 1.12 MG/DL (ref 0.6–1.3)
GFR SERPL CREATININE-BSD FRML MDRD: 45 ML/MIN/1.73SQ M
GLUCOSE SERPL-MCNC: 108 MG/DL (ref 65–140)
MAGNESIUM SERPL-MCNC: 1.8 MG/DL (ref 1.9–2.7)
POTASSIUM SERPL-SCNC: 4.1 MMOL/L (ref 3.5–5.3)
SODIUM SERPL-SCNC: 140 MMOL/L (ref 135–147)

## 2023-01-30 NOTE — TELEPHONE ENCOUNTER
Pt called and said that she is experiencing chest discomfort, shaky  and SOB just wants to see Crystal please call pt [phone]

## 2023-01-30 NOTE — TELEPHONE ENCOUNTER
Called pt to discuss  She states that this started Friday  She has been having diarrhea, SOB, chest pressure, and flutter of her heart  I sent a TT to Dr Sander Dance and Anastacio Shine  They both stated to go to ED  Pt is refusing  Asking for an appointment with Dr Sander Dance  I informed pt that she is very booked, and this is something acute that they both feel that she needs to go to the ED for  Pt again refused  States that "this is sad  I have known Crystal for years and have been her pt for years and I cannot get in to see her "     I did attempt to explain that this is not something personal, that we do not have appointments for a while with her  That and that this is something that she should have looked at TODAY  Pt asked for next appointment with Anastacio Shine, she is scheduled and aware  She stated that if this progresses she think about going  I did let Talisha Mari know

## 2023-01-30 NOTE — TELEPHONE ENCOUNTER
Called and spoke with patient as she was declining to come to the ER  She states she first started with diarrhea and not feeling well Thursday/Friday last week  She notes chest heaviness when lying down only  She does feel an intermittent flutter in her chest during the day  She denies exertional chest heaviness or lightheadedness  She states she has been running errands today  BP at home is 130/70's with HR in 60's per her report  I asked her to come to the office for BP and EKG  Based on results will determine further plan  Concern she may be back in a fib

## 2023-01-31 ENCOUNTER — TELEPHONE (OUTPATIENT)
Dept: CARDIOLOGY CLINIC | Facility: CLINIC | Age: 83
End: 2023-01-31

## 2023-01-31 NOTE — TELEPHONE ENCOUNTER
Pt called to let us know her Bps last night       5 pm when she got home-  180/77    5:30 pm- 145/64    9 pm- 139/66    10:17 pm- 126/68    11:30 pm- 120/ 62    This AM- having diarrhea and did not take med's- 138/61

## 2023-02-01 ENCOUNTER — OFFICE VISIT (OUTPATIENT)
Dept: CARDIOLOGY CLINIC | Facility: CLINIC | Age: 83
End: 2023-02-01

## 2023-02-01 VITALS
HEIGHT: 63 IN | HEART RATE: 71 BPM | BODY MASS INDEX: 24.27 KG/M2 | RESPIRATION RATE: 12 BRPM | OXYGEN SATURATION: 97 % | DIASTOLIC BLOOD PRESSURE: 81 MMHG | SYSTOLIC BLOOD PRESSURE: 155 MMHG | WEIGHT: 137 LBS | TEMPERATURE: 97.8 F

## 2023-02-01 DIAGNOSIS — E78.2 MIXED HYPERLIPIDEMIA: ICD-10-CM

## 2023-02-01 DIAGNOSIS — R07.89 CHEST DISCOMFORT: Primary | ICD-10-CM

## 2023-02-01 DIAGNOSIS — I48.0 PAROXYSMAL ATRIAL FIBRILLATION (HCC): ICD-10-CM

## 2023-02-01 DIAGNOSIS — I10 ESSENTIAL HYPERTENSION: ICD-10-CM

## 2023-02-01 DIAGNOSIS — K21.9 GASTROESOPHAGEAL REFLUX DISEASE WITHOUT ESOPHAGITIS: ICD-10-CM

## 2023-02-01 RX ORDER — FAMOTIDINE 20 MG/1
40 TABLET, FILM COATED ORAL 2 TIMES DAILY
Qty: 28 TABLET | Refills: 0 | Status: SHIPPED | OUTPATIENT
Start: 2023-02-01 | End: 2023-02-08

## 2023-02-01 NOTE — PROGRESS NOTES
Cardiology Follow Up    Adriano Paulino  1940  [id number]  [facility]  [address]  [address] [facility] [address] [facility]  [phone]    Ben Older presents for evaluation of palpitations and chest pressure  1  Chest discomfort  Assessment & Plan:  Patient reports episodes of chest "heaviness" when lying down which started over the weekend along with profuse diarrhea  She states symptoms are steadily improving this week  Will increase Pepcid due to history of GERD  Reviewed BRAT diet  Monitoring BP  We discussed consideration for updated stress test if chest heaviness persists despite improvement of GI symptoms  Will monitor closely  2  Paroxysmal atrial fibrillation [facility])  Assessment & Plan:  Atrial fibrillation diagnosed 9/9/2020  Echocardiogram 10/27/2020 was unremarkable  Nuclear lexiscan stress test 10/27/2020 was normal   14 day ZIO showed no atrial fibrillation but did show 79 episodes of SVT with the longest lasting 19 2 seconds as well as frequent PAC's (6 8%)  ? Atrial tachycardia on some strips  Declines sleep study  TSH WNL  Patient was started on anticoagulation with coumadin for CHADsVASc score  4  INR goal 2-3 (managed by PCP)  Discussed possible transition to [address] such as eliquis/pradaxa/xarelto, however she deferred secondary to cost   Currently in sinus rhythm, remains on metoprolol succinate 37 5mg daily  BMP/Mag reviewed  Continue daily magnesium 250 mg daily  3  Essential hypertension  Assessment & Plan:  BP mildly elevated today, however well controlled at home  Accuracy of home BP cuff confirmed today compared to my manual reading  Continue losartan 50 mg daily, metoprolol succinate 37 5 mg daily  Continue to monitor at home and report readings persistently > 145/90  Lab work reviewed  4  Mixed hyperlipidemia  Assessment & Plan:  Lipid panel 9/9/2020: C 191  T 198  H 63  L 92    Patient was on rosuvastatin 10 mg daily at that time  Rosuvastatin stopped in January 2022 due to right lower leg pain  Lipid panel through PCP office 6/14/2022: C 379  T 309  H 71  L 246  She has resumed rosuvastatin 10 mg daily and reports faithful administration  Lipid panel 8/22/2022: C 200  T 104  H 68  L 111       5  Gastroesophageal reflux disease without esophagitis  Assessment & Plan:  Increasing acid reflux symptoms associated with recent diarrhea  Currently on Pepcid 20 mg BID  Recommend increase to 40 mg BID x 1 week and assess response  Follow up with GI recommended  Orders:  -     famotidine (PEPCID) 20 mg tablet; Take 2 tablets (40 mg total) by mouth 2 (two) times a day for 7 days       HPI  Juan Scott has a past medical history of atrial fibrillation, HTN, HLD, GERD, and more recently 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      She initially established with Dr Farooq La on 9/29/2020 for evaluation of shortness of breath and tightness in chest when climbing steps  She was also experiencing heart racing  ECG at PCP office revealed rate controlled a fib  She was started on warfarin and metoprolol at that time  Her ECG at 9/29/2020 office visit showed normal sinus rhythm  ZIO monitoring, echocardiogram, and Lexiscan nuclear stress testing were ordered (full details below) with evidence of normal heart function and no scar or ischemia      January 2022 she went for a walk while visiting family in [place] and she walked 1 7 miles  She had no pain during the walk but the next day developed severe R calf pain which persisted  She was evaluated at [facility] ER 1/16/2022  Venous duplex was negative for DVT  CT of the right leg demonstrated arthritic changes with an 8 mm intra-articular loose body  She was evaluated by orthopedic, Dr Carolina Barone with no further recommendations   Her rosuvastatin was placed on hold at that time       Oxana completed blood work through her PCP on 6/14/2022 with significantly elevated cholesterol: total cholesterol 379, triglycerides 309, HDL 71,   Her PCP instructed her to schedule an appointment to discuss alternate lipid lowering therapy  She met with me on 6/28/2022 and we agreed to restart her rosuvastatin 10 mg daily  She denied cardiac complaints at that office visit  2/1/2023: Meri Muñoz reached out to our office 1/30/23 to report onset of chest "fluttering" and "heaviness" she reported symptoms began over the weekend associated with profuse diarrhea  She was encouraged to proceed to the ER but declined  She agreed to come to our office for ECG which showed NSR with rate in the 70's  BP was quite elevated in the 759'B systolic, however she was very anxious  She presents today for close follow up  She reports that symptoms started with profuse diarrhea  She then noted heaviness in her chest when lying down along with indigestion and acid reflux  She feels a "fluttering" throughout the day in her chest  She denies exertional chest discomfort, lightheadedness, near syncope  She feels remarkably better the past 2 days  She was at her endocrinologist office earlier today and that appointment went well  She brings a list of home BP readings ranging from 119-145/60-70's  She brings her home BP cuff today which is accurate compared to my manual reading  We reviewed her BMP and mag levels ordered yesterday which show mag 1 8, K+ 4 1, creatinine 1  12  Medical Problems     Problem List     Atrial fibrillation [facility])    Essential hypertension    Mixed hyperlipidemia    Chest discomfort    Leg pain        Past Medical History:   Diagnosis Date   • Arachnoid cyst     of the sella   • Arthritis    • Atrial fibrillation ([address] )    • Cataract    • Depression    • Diverticular disease    • GERD (gastroesophageal reflux disease)    • Glaucoma    • Hyperlipidemia    • Hypertension    • Polyarthropathy      Social History     Socioeconomic History   • Marital status:       Spouse name: Not on file • Number of children: Not on file   • Years of education: Not on file   • Highest education level: Not on file   Occupational History   • Occupation: Retired Pharmacy Tech   Tobacco Use   • Smoking status: Never   • Smokeless tobacco: Never   Vaping Use   • Vaping Use: Never used   Substance and Sexual Activity   • Alcohol use:  Yes   • Drug use: Never   • Sexual activity: Not on file     Comment: Defer   Other Topics Concern   • Not on file   Social History Narrative   • Not on file     Social Determinants of Health     Financial Resource Strain: Not on file   Food Insecurity: Not on file   Transportation Needs: Not on file   Physical Activity: Not on file   Stress: Not on file   Social Connections: Not on file   Intimate Partner Violence: Not on file   Housing Stability: Not on file      Family History   Problem Relation Age of Onset   • Coronary artery disease Mother    • Colon cancer Father    • Coronary artery disease Son    • No Known Problems Son      Past Surgical History:   Procedure Laterality Date   • APPENDECTOMY     • CARDIAC CATHETERIZATION  1997   • CHOLECYSTECTOMY     • COLECTOMY     • DISCECTOMY SPINE CERVICAL ANTERIOR     • HYSTERECTOMY W/ SALPINGO-OOPHERECTOMY     • LUMBAR LAMINECTOMY     • OTHER SURGICAL HISTORY      spur removal   • OTHER SURGICAL HISTORY  05/06/2021    Trans sphenoid decompression of arachnoid cyst of the sella with subcutaneous fat packing   • TONSILLECTOMY         Current Outpatient Medications:   •  famotidine (PEPCID) 20 mg tablet, Take 2 tablets (40 mg total) by mouth 2 (two) times a day for 7 days, Disp: 28 tablet, Rfl: 0  •  ALPRAZolam (XANAX) 1 mg tablet, Take 1 mg by mouth daily as needed  , Disp: , Rfl:   •  Calcium Carbonate-Vit D-Min (CALTRATE 600+D PLUS PO), Caltrate 600 plus D  1 PO QD, Disp: , Rfl:   •  Cholecalciferol (VITAMIN D3 PO), Take by mouth, Disp: , Rfl:   •  latanoprost (XALATAN) 0 005 % ophthalmic solution, INSTILL 1 DROP INTO BOTH EYES IN THE EVENING, Disp: , Rfl:   •  losartan (COZAAR) 50 mg tablet, Take 50 mg by mouth daily, Disp: , Rfl:   •  Magnesium 250 MG TABS, , Disp: , Rfl:   •  meclizine (ANTIVERT) 25 mg tablet, meclizine 25 mg tablet  take 1 tablet by mouth three times a day if needed for dizziness, Disp: , Rfl:   •  metoprolol succinate (TOPROL-XL) 25 mg 24 hr tablet, Take 1 5 tablets (37 5 mg total) by mouth daily, Disp: 135 tablet, Rfl: 3  •  Multiple Vitamins-Minerals (CENTRUM SILVER PO), Centrum Silver  1 PO QD, Disp: , Rfl:   •  Multiple Vitamins-Minerals (PRESERVISION AREDS 2 PO), Take by mouth, Disp: , Rfl:   •  rosuvastatin (CRESTOR) 10 MG tablet, Take 1 tablet (10 mg total) by mouth daily, Disp: 90 tablet, Rfl: 3  •  warfarin (COUMADIN) 2 mg tablet, Take 2 mg by mouth daily , Disp: , Rfl:   Allergies   Allergen Reactions   • Metronidazole Anaphylaxis     Can Take Oral Only Had A Reaction To IV Flagyl     • Adhesive  [Medical Tape]    • Advil  [Ibuprofen]    • Antihistamines, Loratadine-Type    • Demerol [Meperidine]    • Ketorolac Tromethamine    • Lisinopril Cough   • Pantoprazole      severe reaction     • Tolmetin        Labs:     Chemistry        Component Value Date/Time    K 4 1 01/30/2023 1624     01/30/2023 1624    CO2 27 01/30/2023 1624    BUN 22 01/30/2023 1624    CREATININE 1 12 01/30/2023 1624        Component Value Date/Time    CALCIUM 9 8 01/30/2023 1624    ALKPHOS 74 08/22/2022 0802    AST 19 08/22/2022 0802    ALT 23 08/22/2022 0802        Lipid panel 8/22/2022: C 200  T 104  H 68  L 111  Test Results:   ECG 1/30/2023: Normal sinus rhythm  Rate 79 bpm  Normal ECG  ECG 1/11/2021: Normal sinus rhythm  Normal EKG     Echocardiogram 10/27/2020:  Vigorous LV function with ejection fraction of 65-70%  Mild mitral regurgitation  Mild tricuspid regurgitation   Estimated PASP 25 mmHg       Lexiscan nuclear stress test 10/27/2020:   No evidence of myocardial scar or ischemia   EF 91%       ZIO 9/30-10/14/2020: 14 days:  Min HR 49  Max  (sinus)  Max  with SVT  Avg HR 65 bpm    79 episodes of SVT with longest lasting 19 2 seconds  Some SVT may be AT with variable block  Frequent PAC's (6 8%)  Rare PVC's (<1 0%)     ECG 9/29/2020: Normal sinus rhythm  Minimal voltage criteria for LVH which may be normal variant       ECG 9/9/2020: Atrial fibrillation with controlled ventricular response  Review of Systems   Constitutional: Positive for malaise/fatigue  HENT: Negative  Cardiovascular: Positive for chest pain (episodes of chest 'heaviness' when lying down, not with exertion) and palpitations ('fluttering' in chest)  Negative for dyspnea on exertion, irregular heartbeat, leg swelling, near-syncope, orthopnea and syncope  Respiratory: Negative for cough and snoring  Endocrine: Negative  Skin: Negative  Musculoskeletal: Negative  Gastrointestinal: Positive for diarrhea and heartburn  Genitourinary: Negative  Neurological: Negative  Psychiatric/Behavioral: Negative  Vitals:    02/01/23 1544   BP: 155/81   Pulse: 71   Resp:    Temp:    SpO2:      Vitals:    02/01/23 1532   Weight: 62 1 kg (137 lb)     Height: 5' 3" (160 cm)   Body mass index is 24 27 kg/m²  Physical Exam  Vitals and nursing note reviewed  Constitutional:       General: She is not in acute distress  Appearance: She is well-developed  She is not diaphoretic  HENT:      Head: Normocephalic and atraumatic  Neck:      Thyroid: No thyromegaly  Vascular: No carotid bruit or JVD  Cardiovascular:      Rate and Rhythm: Normal rate and regular rhythm  No extrasystoles are present  Pulses: Intact distal pulses  Radial pulses are 2+ on the right side and 2+ on the left side  Heart sounds: Normal heart sounds, S1 normal and S2 normal  No murmur heard  Comments: No edema  Pulmonary:      Effort: Pulmonary effort is normal       Breath sounds: Normal breath sounds     Abdominal: General: There is no distension  Palpations: Abdomen is soft  Tenderness: There is no abdominal tenderness  Musculoskeletal:         General: Normal range of motion  Cervical back: Normal range of motion and neck supple  Lymphadenopathy:      Cervical: No cervical adenopathy  Skin:     General: Skin is warm and dry  Neurological:      Mental Status: She is alert and oriented to person, place, and time  Cranial Nerves: No cranial nerve deficit  Psychiatric:         Mood and Affect: Mood and affect normal          Speech: Speech normal          Behavior: Behavior normal  Behavior is cooperative           Cognition and Memory: Cognition and memory normal

## 2023-02-01 NOTE — PATIENT INSTRUCTIONS
I do believe your symptoms were from a GI bug  Please follow a bland diet - rice, plain chicken, bananas, toast  Avoid greasy/fried foods  Your labs were ok, magnesium was slightly low- please continue your magnesium supplement  Your blood pressure cuff is accurate  Please continue to check BP once daily at least 2 hours after taking your medications  Notify us if persistently > 145/90  Increase your Pepcid to 40 mg twice daily for 1 week  Avoid carbonated drinks  Contact us with any new or worsening symptoms

## 2023-02-04 PROBLEM — K21.9 GASTROESOPHAGEAL REFLUX DISEASE WITHOUT ESOPHAGITIS: Status: ACTIVE | Noted: 2023-02-04

## 2023-02-04 NOTE — ASSESSMENT & PLAN NOTE
Lipid panel 9/9/2020: C 191  T 198  H 63  L 92  Patient was on rosuvastatin 10 mg daily at that time  Rosuvastatin stopped in January 2022 due to right lower leg pain  Lipid panel through PCP office 6/14/2022: C 379  T 309  H 71  L 246  She has resumed rosuvastatin 10 mg daily and reports faithful administration  Lipid panel 8/22/2022: C 200  T 104  H 68  L 111

## 2023-02-04 NOTE — ASSESSMENT & PLAN NOTE
Patient reports episodes of chest "heaviness" when lying down which started over the weekend along with profuse diarrhea  She states symptoms are steadily improving this week  Will increase Pepcid due to history of GERD  Reviewed BRAT diet  Monitoring BP  We discussed consideration for updated stress test if chest heaviness persists despite improvement of GI symptoms  Will monitor closely

## 2023-02-04 NOTE — ASSESSMENT & PLAN NOTE
Atrial fibrillation diagnosed 9/9/2020  Echocardiogram 10/27/2020 was unremarkable  Nuclear lexiscan stress test 10/27/2020 was normal   14 day ZIO showed no atrial fibrillation but did show 79 episodes of SVT with the longest lasting 19 2 seconds as well as frequent PAC's (6 8%)  ? Atrial tachycardia on some strips  Declines sleep study  TSH WNL  Patient was started on anticoagulation with coumadin for CHADsVASc score  4  INR goal 2-3 (managed by PCP)  Discussed possible transition to [address] [place], [place] [address] such as eliquis/pradaxa/xarelto, however she deferred secondary to cost   Currently in sinus rhythm, remains on metoprolol succinate 37 5mg daily  BMP/Mag reviewed  Continue daily magnesium 250 mg daily

## 2023-02-04 NOTE — ASSESSMENT & PLAN NOTE
Increasing acid reflux symptoms associated with recent diarrhea  Currently on Pepcid 20 mg BID  Recommend increase to 40 mg BID x 1 week and assess response  Follow up with GI recommended

## 2023-02-04 NOTE — ASSESSMENT & PLAN NOTE
BP mildly elevated today, however well controlled at home  Accuracy of home BP cuff confirmed today compared to my manual reading  Continue losartan 50 mg daily, metoprolol succinate 37 5 mg daily  Continue to monitor at home and report readings persistently > 145/90  Lab work reviewed

## 2023-04-06 ENCOUNTER — TELEPHONE (OUTPATIENT)
Dept: CARDIOLOGY CLINIC | Facility: CLINIC | Age: 83
End: 2023-04-06

## 2023-04-06 DIAGNOSIS — K21.9 GASTROESOPHAGEAL REFLUX DISEASE WITHOUT ESOPHAGITIS: ICD-10-CM

## 2023-04-06 NOTE — TELEPHONE ENCOUNTER
Pt called and would like to know if Chely Lee can re-new her Pepcid she takes it 2/d  Please call pt [phone]    Pt's pharm is [facility]

## 2023-04-07 RX ORDER — FAMOTIDINE 20 MG/1
20 TABLET, FILM COATED ORAL 2 TIMES DAILY
Qty: 60 TABLET | Refills: 3 | Status: SHIPPED | OUTPATIENT
Start: 2023-04-07

## 2023-05-03 ENCOUNTER — OFFICE VISIT (OUTPATIENT)
Dept: CARDIOLOGY CLINIC | Facility: CLINIC | Age: 83
End: 2023-05-03

## 2023-05-03 VITALS
RESPIRATION RATE: 12 BRPM | HEIGHT: 63 IN | TEMPERATURE: 97.4 F | HEART RATE: 86 BPM | SYSTOLIC BLOOD PRESSURE: 140 MMHG | WEIGHT: 137 LBS | DIASTOLIC BLOOD PRESSURE: 80 MMHG | OXYGEN SATURATION: 97 % | BODY MASS INDEX: 24.27 KG/M2

## 2023-05-03 DIAGNOSIS — N18.31 STAGE 3A CHRONIC KIDNEY DISEASE (HCC): ICD-10-CM

## 2023-05-03 DIAGNOSIS — I48.0 PAROXYSMAL ATRIAL FIBRILLATION (HCC): Primary | ICD-10-CM

## 2023-05-03 DIAGNOSIS — I10 ESSENTIAL HYPERTENSION: ICD-10-CM

## 2023-05-03 DIAGNOSIS — K21.9 GASTROESOPHAGEAL REFLUX DISEASE WITHOUT ESOPHAGITIS: ICD-10-CM

## 2023-05-03 DIAGNOSIS — E78.2 MIXED HYPERLIPIDEMIA: ICD-10-CM

## 2023-05-25 NOTE — ASSESSMENT & PLAN NOTE
Lipid panel 8/22/2022: C 200  T 104  H 68  L 111  Remains on rosuvastatin 10 mg daily  Will continue to monitor with upcoming blood work

## 2023-05-25 NOTE — ASSESSMENT & PLAN NOTE
Lab Results   Component Value Date    CREATININE 1 12 01/30/2023    CREATININE 1 18 01/17/2022    CREATININE 1 12 01/16/2022    EGFR 45 01/30/2023    EGFR 43 01/17/2022    EGFR 45 01/16/2022     Stable on recent labs

## 2023-05-25 NOTE — ASSESSMENT & PLAN NOTE
Atrial fibrillation diagnosed 9/9/2020  Echocardiogram 10/27/2020 was unremarkable  Nuclear lexiscan stress test 10/27/2020 was normal   14 day ZIO showed no atrial fibrillation but did show 79 episodes of SVT with the longest lasting 19 2 seconds as well as frequent PAC's (6 8%)  ? Atrial tachycardia on some strips  Declines sleep study  TSH WNL  Patient was started on anticoagulation with coumadin for CHADsVASc score  4  INR goal 2-3 (managed by PCP)  Discussed possible transition to [address] [place], [place] [address] such as eliquis/pradaxa/xarelto, however she deferred secondary to cost   Currently in sinus rhythm, remains on metoprolol succinate 37 5mg daily  BMP/Mag reviewed  Continue daily magnesium 250 mg daily

## 2023-05-25 NOTE — PROGRESS NOTES
Cardiology Follow Up    Chalino Half  1940  [id number]  [facility] [place]  [address]  [address] [facility] [address] [phone]    Cindy Burch presents for close follow up from chest heaviness and palpitations associated with GI symptoms  1  Paroxysmal atrial fibrillation [facility])  Assessment & Plan:  Atrial fibrillation diagnosed 9/9/2020  Echocardiogram 10/27/2020 was unremarkable  Nuclear lexiscan stress test 10/27/2020 was normal   14 day ZIO showed no atrial fibrillation but did show 79 episodes of SVT with the longest lasting 19 2 seconds as well as frequent PAC's (6 8%)  ? Atrial tachycardia on some strips  Declines sleep study  TSH WNL  Patient was started on anticoagulation with coumadin for CHADsVASc score  4  INR goal 2-3 (managed by PCP)  Discussed possible transition to [address] such as eliquis/pradaxa/xarelto, however she deferred secondary to cost   Currently in sinus rhythm, remains on metoprolol succinate 37 5mg daily  BMP/Mag reviewed  Continue daily magnesium 250 mg daily  Orders:  -     Comprehensive metabolic panel; Future; Expected date: 08/07/2023  -     CBC and differential; Future; Expected date: 08/07/2023    2  Mixed hyperlipidemia  Assessment & Plan:  Lipid panel 8/22/2022: C 200  T 104  H 68  L 111  Remains on rosuvastatin 10 mg daily  Will continue to monitor with upcoming blood work  Orders:  -     Lipid Panel with Direct LDL reflex; Future; Expected date: 08/07/2023  -     Comprehensive metabolic panel; Future; Expected date: 08/07/2023    3  Essential hypertension  Assessment & Plan:  BP acceptable today  Continue losartan 50 mg daily, metoprolol succinate 37 5 mg daily  Continue to monitor at home and report readings persistently > 145/90  Lab work ordered  Orders:  -     Comprehensive metabolic panel; Future; Expected date: 08/07/2023  -     CBC and differential; Future; Expected date: 08/07/2023    4  Stage 3a chronic kidney disease [facility])  Assessment & Plan:  Lab Results   Component Value Date    CREATININE 1 12 01/30/2023    CREATININE 1 18 01/17/2022    CREATININE 1 12 01/16/2022    EGFR 45 01/30/2023    EGFR 43 01/17/2022    EGFR 45 01/16/2022     Stable on recent labs  5  Gastroesophageal reflux disease without esophagitis  Assessment & Plan:  Significantly improved  Currently on Pepcid 20 mg BID  I did recommend GI follow up  HPI  Griselda Savoy has a past medical history of atrial fibrillation, HTN, HLD, GERD, and more recently 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      She initially established with Dr Sam Aquino on 9/29/2020 for evaluation of shortness of breath and tightness in chest when climbing steps  She was also experiencing heart racing  ECG at PCP office revealed rate controlled a fib  She was started on warfarin and metoprolol at that time  Her ECG at 9/29/2020 office visit showed normal sinus rhythm  ZIO monitoring, echocardiogram, and Lexiscan nuclear stress testing were ordered (full details below) with evidence of normal heart function and no scar or ischemia      January 2022 she went for a walk while visiting family in [place] and she walked 1 7 miles  She had no pain during the walk but the next day developed severe R calf pain which persisted  She was evaluated at [facility] 1/16/2022  Venous duplex was negative for DVT  CT of the right leg demonstrated arthritic changes with an 8 mm intra-articular loose body  She was evaluated by orthopedic, Dr Bradly Chavis with no further recommendations  Her rosuvastatin was placed on hold at that time       Oxana completed blood work through her PCP on 6/14/2022 with significantly elevated cholesterol: total cholesterol 379, triglycerides 309, HDL 71,   Her PCP instructed her to schedule an appointment to discuss alternate lipid lowering therapy  She met with me on 6/28/2022 and we agreed to restart her rosuvastatin 10 mg daily   She "denied cardiac complaints at that office visit  She reached out on 1/30/2023 to request an OV  She met with me on 2/1/2023 at which time she reported chest \"fluttering\" and \"heaviness\" that started over the weekend associated with profuse diarrhea  ECG showed NSR with HR in the 70's  She reported indigestion, acid reflux, worse with lying down  She denied exertional chest discomfort, lightheadedness, or near syncope  Her symptoms did improve quite rapidly  Lab work was ordered with stable kidney function and electrolytes  Pepcid was increased to 40 mg BID for 1 week for GERD treatment and close follow up arranged  5/3/2023Ever Núñez presents today for close follow up  She tells me \"I don't know why I am here, I feel great  \" She has no complaints today  She reports complete resolution of her GERD symptoms  She denies chest pain, palpitations, shortness of breath, lightheadedness, or edema  BP at home is well controlled  No tarry stools or jhoan bleeding  Medical Problems     Problem List     Atrial fibrillation [facility])    Essential hypertension    Mixed hyperlipidemia    Chest discomfort    Leg pain    Gastroesophageal reflux disease without esophagitis    Stage 3a chronic kidney disease ([address] )        Past Medical History:   Diagnosis Date   • Arachnoid cyst     of the sella   • Arthritis    • Atrial fibrillation ([address] )    • Cataract    • Depression    • Diverticular disease    • GERD (gastroesophageal reflux disease)    • Glaucoma    • Hyperlipidemia    • Hypertension    • Macular degeneration    • Polyarthropathy      Social History     Socioeconomic History   • Marital status:       Spouse name: Not on file   • Number of children: Not on file   • Years of education: Not on file   • Highest education level: Not on file   Occupational History   • Occupation: Retired Pharmacy Tech   Tobacco Use   • Smoking status: Never   • Smokeless tobacco: Never   Vaping Use   • Vaping Use: Never used   Substance and Sexual " Activity   • Alcohol use:  Yes   • Drug use: Never   • Sexual activity: Not on file     Comment: Defer   Other Topics Concern   • Not on file   Social History Narrative   • Not on file     Social Determinants of Health     Financial Resource Strain: Not on file   Food Insecurity: Not on file   Transportation Needs: Not on file   Physical Activity: Not on file   Stress: Not on file   Social Connections: Not on file   Intimate Partner Violence: Not on file   Housing Stability: Not on file      Family History   Problem Relation Age of Onset   • Coronary artery disease Mother    • Colon cancer Father    • Coronary artery disease Son    • No Known Problems Son      Past Surgical History:   Procedure Laterality Date   • APPENDECTOMY     • CARDIAC CATHETERIZATION  1997   • CATARACT EXTRACTION, BILATERAL  2022   • CHOLECYSTECTOMY     • COLECTOMY     • DISCECTOMY SPINE CERVICAL ANTERIOR     • HYSTERECTOMY W/ SALPINGO-OOPHERECTOMY     • LUMBAR LAMINECTOMY     • OTHER SURGICAL HISTORY      spur removal   • OTHER SURGICAL HISTORY  05/06/2021    Trans sphenoid decompression of arachnoid cyst of the sella with subcutaneous fat packing   • SKIN LESION EXCISION  04/25/2023    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   • TONSILLECTOMY         Current Outpatient Medications:   •  ALPRAZolam (XANAX) 1 mg tablet, Take 1 mg by mouth daily as needed  , Disp: , Rfl:   •  Calcium Carbonate-Vit D-Min (CALTRATE 600+D PLUS PO), Caltrate 600 plus D  1 PO QD, Disp: , Rfl:   •  Cholecalciferol (VITAMIN D3 PO), Take by mouth, Disp: , Rfl:   •  famotidine (PEPCID) 20 mg tablet, Take 1 tablet (20 mg total) by mouth 2 (two) times a day, Disp: 60 tablet, Rfl: 3  •  losartan (COZAAR) 50 mg tablet, Take 50 mg by mouth daily, Disp: , Rfl:   •  Magnesium 250 MG TABS, , Disp: , Rfl:   •  meclizine (ANTIVERT) 25 mg tablet, meclizine 25 mg tablet  take 1 tablet by mouth three times a day if needed for dizziness, Disp: , Rfl:   •  metoprolol succinate (TOPROL-XL) 25 mg 24 hr tablet, Take 1 5 tablets (37 5 mg total) by mouth daily, Disp: 135 tablet, Rfl: 3  •  Multiple Vitamins-Minerals (CENTRUM SILVER PO), Centrum Silver  1 PO QD, Disp: , Rfl:   •  Multiple Vitamins-Minerals (PRESERVISION AREDS 2 PO), Take by mouth, Disp: , Rfl:   •  rosuvastatin (CRESTOR) 10 MG tablet, Take 1 tablet (10 mg total) by mouth daily, Disp: 90 tablet, Rfl: 3  •  warfarin (COUMADIN) 2 mg tablet, Take 2 mg by mouth daily , Disp: , Rfl:   Allergies   Allergen Reactions   • Metronidazole Anaphylaxis     Can Take Oral Only Had A Reaction To IV Flagyl     • Adhesive  [Medical Tape]    • Advil  [Ibuprofen]    • Antihistamines, Loratadine-Type    • Demerol [Meperidine]    • Ketorolac Tromethamine    • Lisinopril Cough   • Pantoprazole      severe reaction     • Tolmetin        Labs:     Chemistry        Component Value Date/Time    BUN 22 01/30/2023 1624     01/30/2023 1624    CO2 27 01/30/2023 1624    CREATININE 1 12 01/30/2023 1624    K 4 1 01/30/2023 1624        Component Value Date/Time    ALKPHOS 74 08/22/2022 0802    ALT 23 08/22/2022 0802    AST 19 08/22/2022 0802    CALCIUM 9 8 01/30/2023 1624        Lipid panel 8/22/2022: C 200  T 104  H 68  L 111      Test Results:   ECG 1/30/2023: Normal sinus rhythm  Rate 79 bpm  Normal ECG      ECG 1/11/2021: Normal sinus rhythm  Normal EKG     Echocardiogram 10/27/2020:  Vigorous LV function with ejection fraction of 65-70%  Mild mitral regurgitation  Mild tricuspid regurgitation   Estimated PASP 25 mmHg       Lexiscan nuclear stress test 10/27/2020:   No evidence of myocardial scar or ischemia  EF 91%       ZIO 9/30-10/14/2020: 14 days:  Min HR 49  Max  (sinus)  Max  with SVT  Avg HR 65 bpm    79 episodes of SVT with longest lasting 19 2 seconds  Some SVT may be AT with variable block  Frequent PAC's (6 8%)  Rare PVC's (<1 0%)     ECG 9/29/2020: Normal sinus rhythm  Minimal voltage criteria for LVH which may be normal variant  "     ECG 9/9/2020: Atrial fibrillation with controlled ventricular response      Review of Systems   Constitutional: Negative  HENT: Negative  Cardiovascular: Negative for chest pain, dyspnea on exertion, irregular heartbeat, leg swelling, near-syncope, orthopnea and palpitations  Respiratory: Negative for cough and snoring  Endocrine: Negative  Skin: Negative  Musculoskeletal: Negative  Gastrointestinal: Negative  Genitourinary: Negative  Neurological: Negative  Psychiatric/Behavioral: Negative  Vitals:    05/03/23 1401   BP: 140/80   Pulse: 86   Resp: 12   Temp: (!) 97 4 °F (36 3 °C)   SpO2: 97%     Vitals:    05/03/23 1401   Weight: 62 1 kg (137 lb)     Height: 5' 3\" (160 cm)   Body mass index is 24 27 kg/m²  Physical Exam  Vitals and nursing note reviewed  Constitutional:       General: She is not in acute distress  Appearance: She is well-developed  She is not diaphoretic  HENT:      Head: Normocephalic and atraumatic  Neck:      Thyroid: No thyromegaly  Vascular: No carotid bruit or JVD  Cardiovascular:      Rate and Rhythm: Normal rate and regular rhythm  Pulses: Intact distal pulses  Radial pulses are 2+ on the right side and 2+ on the left side  Heart sounds: Normal heart sounds, S1 normal and S2 normal  No murmur heard  Comments: No edema  Pulmonary:      Effort: Pulmonary effort is normal       Breath sounds: Normal breath sounds  Abdominal:      General: There is no distension  Palpations: Abdomen is soft  Tenderness: There is no abdominal tenderness  Musculoskeletal:         General: Normal range of motion  Cervical back: Normal range of motion and neck supple  Lymphadenopathy:      Cervical: No cervical adenopathy  Skin:     General: Skin is warm and dry  Neurological:      Mental Status: She is alert and oriented to person, place, and time  Cranial Nerves: No cranial nerve deficit   " Psychiatric:         Mood and Affect: Mood and affect normal          Speech: Speech normal          Behavior: Behavior normal  Behavior is cooperative           Cognition and Memory: Cognition normal

## 2023-05-25 NOTE — ASSESSMENT & PLAN NOTE
BP acceptable today  Continue losartan 50 mg daily, metoprolol succinate 37 5 mg daily  Continue to monitor at home and report readings persistently > 145/90  Lab work ordered

## 2023-07-06 DIAGNOSIS — K21.9 GASTROESOPHAGEAL REFLUX DISEASE WITHOUT ESOPHAGITIS: ICD-10-CM

## 2023-07-06 RX ORDER — FAMOTIDINE 20 MG/1
20 TABLET, FILM COATED ORAL 2 TIMES DAILY
Qty: 60 TABLET | Refills: 11 | Status: SHIPPED | OUTPATIENT
Start: 2023-07-06

## 2023-07-15 ENCOUNTER — APPOINTMENT (OUTPATIENT)
Dept: LAB | Facility: HOSPITAL | Age: 83
End: 2023-07-15
Payer: MEDICARE

## 2023-07-15 DIAGNOSIS — I48.0 PAROXYSMAL ATRIAL FIBRILLATION (HCC): ICD-10-CM

## 2023-07-15 DIAGNOSIS — I10 ESSENTIAL HYPERTENSION: ICD-10-CM

## 2023-07-15 DIAGNOSIS — E78.2 MIXED HYPERLIPIDEMIA: ICD-10-CM

## 2023-07-15 LAB
ALBUMIN SERPL BCP-MCNC: 4.2 G/DL (ref 3.5–5)
ALP SERPL-CCNC: 72 U/L (ref 34–104)
ALT SERPL W P-5'-P-CCNC: 13 U/L (ref 7–52)
ANION GAP SERPL CALCULATED.3IONS-SCNC: 7 MMOL/L
AST SERPL W P-5'-P-CCNC: 23 U/L (ref 13–39)
BASOPHILS # BLD AUTO: 0.05 THOUSANDS/ÂΜL (ref 0–0.1)
BASOPHILS NFR BLD AUTO: 1 % (ref 0–1)
BILIRUB SERPL-MCNC: 0.64 MG/DL (ref 0.2–1)
BUN SERPL-MCNC: 24 MG/DL (ref 5–25)
CALCIUM SERPL-MCNC: 9.4 MG/DL (ref 8.4–10.2)
CHLORIDE SERPL-SCNC: 105 MMOL/L (ref 96–108)
CHOLEST SERPL-MCNC: 197 MG/DL
CO2 SERPL-SCNC: 28 MMOL/L (ref 21–32)
CREAT SERPL-MCNC: 1.25 MG/DL (ref 0.6–1.3)
EOSINOPHIL # BLD AUTO: 0.2 THOUSAND/ÂΜL (ref 0–0.61)
EOSINOPHIL NFR BLD AUTO: 3 % (ref 0–6)
ERYTHROCYTE [DISTWIDTH] IN BLOOD BY AUTOMATED COUNT: 13.1 % (ref 11.6–15.1)
GFR SERPL CREATININE-BSD FRML MDRD: 39 ML/MIN/1.73SQ M
GLUCOSE P FAST SERPL-MCNC: 111 MG/DL (ref 65–99)
HCT VFR BLD AUTO: 41.7 % (ref 34.8–46.1)
HDLC SERPL-MCNC: 66 MG/DL
HGB BLD-MCNC: 13.7 G/DL (ref 11.5–15.4)
IMM GRANULOCYTES # BLD AUTO: 0.03 THOUSAND/UL (ref 0–0.2)
IMM GRANULOCYTES NFR BLD AUTO: 0 % (ref 0–2)
LDLC SERPL CALC-MCNC: 102 MG/DL (ref 0–100)
LYMPHOCYTES # BLD AUTO: 3.59 THOUSANDS/ÂΜL (ref 0.6–4.47)
LYMPHOCYTES NFR BLD AUTO: 45 % (ref 14–44)
MCH RBC QN AUTO: 32.6 PG (ref 26.8–34.3)
MCHC RBC AUTO-ENTMCNC: 32.9 G/DL (ref 31.4–37.4)
MCV RBC AUTO: 99 FL (ref 82–98)
MONOCYTES # BLD AUTO: 0.83 THOUSAND/ÂΜL (ref 0.17–1.22)
MONOCYTES NFR BLD AUTO: 11 % (ref 4–12)
NEUTROPHILS # BLD AUTO: 3.09 THOUSANDS/ÂΜL (ref 1.85–7.62)
NEUTS SEG NFR BLD AUTO: 40 % (ref 43–75)
NRBC BLD AUTO-RTO: 0 /100 WBCS
PLATELET # BLD AUTO: 268 THOUSANDS/UL (ref 149–390)
PMV BLD AUTO: 10.3 FL (ref 8.9–12.7)
POTASSIUM SERPL-SCNC: 4.6 MMOL/L (ref 3.5–5.3)
PROT SERPL-MCNC: 7 G/DL (ref 6.4–8.4)
RBC # BLD AUTO: 4.2 MILLION/UL (ref 3.81–5.12)
SODIUM SERPL-SCNC: 140 MMOL/L (ref 135–147)
TRIGL SERPL-MCNC: 146 MG/DL
WBC # BLD AUTO: 7.79 THOUSAND/UL (ref 4.31–10.16)

## 2023-07-15 PROCEDURE — 36415 COLL VENOUS BLD VENIPUNCTURE: CPT

## 2023-07-15 PROCEDURE — 80053 COMPREHEN METABOLIC PANEL: CPT

## 2023-07-15 PROCEDURE — 80061 LIPID PANEL: CPT

## 2023-07-15 PROCEDURE — 85025 COMPLETE CBC W/AUTO DIFF WBC: CPT

## 2023-07-29 DIAGNOSIS — K21.9 GASTROESOPHAGEAL REFLUX DISEASE WITHOUT ESOPHAGITIS: ICD-10-CM

## 2023-07-31 RX ORDER — FAMOTIDINE 20 MG/1
20 TABLET, FILM COATED ORAL 2 TIMES DAILY
Qty: 180 TABLET | Refills: 4 | Status: SHIPPED | OUTPATIENT
Start: 2023-07-31

## 2023-08-24 ENCOUNTER — OFFICE VISIT (OUTPATIENT)
Dept: CARDIOLOGY CLINIC | Facility: CLINIC | Age: 83
End: 2023-08-24
Payer: MEDICARE

## 2023-08-24 VITALS
HEIGHT: 63 IN | SYSTOLIC BLOOD PRESSURE: 140 MMHG | WEIGHT: 134.2 LBS | BODY MASS INDEX: 23.78 KG/M2 | TEMPERATURE: 96.7 F | DIASTOLIC BLOOD PRESSURE: 70 MMHG | OXYGEN SATURATION: 98 % | HEART RATE: 61 BPM

## 2023-08-24 DIAGNOSIS — N18.31 STAGE 3A CHRONIC KIDNEY DISEASE (HCC): ICD-10-CM

## 2023-08-24 DIAGNOSIS — I48.0 PAROXYSMAL ATRIAL FIBRILLATION (HCC): Primary | ICD-10-CM

## 2023-08-24 DIAGNOSIS — I10 ESSENTIAL HYPERTENSION: ICD-10-CM

## 2023-08-24 DIAGNOSIS — E78.2 MIXED HYPERLIPIDEMIA: ICD-10-CM

## 2023-08-24 DIAGNOSIS — G93.0 ARACHNOID CYST: ICD-10-CM

## 2023-08-24 PROCEDURE — 99214 OFFICE O/P EST MOD 30 MIN: CPT | Performed by: NURSE PRACTITIONER

## 2023-08-24 RX ORDER — METOPROLOL SUCCINATE 50 MG/1
50 TABLET, EXTENDED RELEASE ORAL DAILY
Qty: 30 TABLET | Refills: 11 | Status: SHIPPED | OUTPATIENT
Start: 2023-08-24

## 2023-08-24 NOTE — ASSESSMENT & PLAN NOTE
BP acceptable today. Excellent on home readings (home BP cuff accuracy has been confirmed). Continue losartan 50 mg daily. Will monitor with up-titration of metoprolol succinate to 50 mg dialy. Continue to monitor at home and report readings persistently > 145/90. Recent lab work reviewed.

## 2023-08-24 NOTE — ASSESSMENT & PLAN NOTE
Lab Results   Component Value Date    EGFR 39 07/15/2023    EGFR 45 01/30/2023    EGFR 43 01/17/2022    CREATININE 1.25 07/15/2023    CREATININE 1.12 01/30/2023    CREATININE 1.18 01/17/2022     Stable on recent labs.

## 2023-08-24 NOTE — PATIENT INSTRUCTIONS
Increase metoprolol to 50 mg daily. ZIO monitor will be mailed to your house and you will bring it in for us to put it on. I will call you with the results when they come back. Continue your magnesium supplement.

## 2023-08-24 NOTE — PROGRESS NOTES
Cardiology Follow Up    Wu Watson  1940  [id number]  [facility]  [address]  [address] [facility] [place]  [phone]    Farrukh Cruz presents for routine follow up of paroxysmal atrial fibrillation, hypertension, hyperlipidemia. 1. Paroxysmal atrial fibrillation [facility])  Assessment & Plan:  Atrial fibrillation diagnosed 9/9/2020. Echocardiogram 10/27/2020 was unremarkable. Nuclear lexiscan stress test 10/27/2020 was normal.  14 day ZIO showed no atrial fibrillation but did show 79 episodes of SVT with the longest lasting 19.2 seconds as well as frequent PAC's (6.8%). ? Atrial tachycardia on some strips. Declines sleep study. TSH WNL. Patient was started on anticoagulation with coumadin for CHADsVASc score  4. INR goal 2-3 (managed by PCP). Discussed possible transition to [address] such as eliquis/pradaxa/xarelto, however she deferred secondary to cost.  Currently in sinus rhythm. She has noticed breakthrough heart racing episodes. Will increase metoprolol succinate to 50 mg daily  7 day ZIO monitor ordered. Continue daily magnesium 250 mg daily. Orders:  -     metoprolol succinate (TOPROL-XL) 50 mg 24 hr tablet; Take 1 tablet (50 mg total) by mouth daily  -     AMB extended holter monitor; Future; Expected date: 08/24/2023    2. Essential hypertension  Assessment & Plan:  BP acceptable today. Excellent on home readings (home BP cuff accuracy has been confirmed). Continue losartan 50 mg daily. Will monitor with up-titration of metoprolol succinate to 50 mg dialy. Continue to monitor at home and report readings persistently > 145/90. Recent lab work reviewed. Orders:  -     metoprolol succinate (TOPROL-XL) 50 mg 24 hr tablet; Take 1 tablet (50 mg total) by mouth daily    3. Mixed hyperlipidemia  Assessment & Plan:  Lipid panel 7/15/2023: C 197. T 146. H 66. L 102. Continue rosuvastatin 10 mg daily.   Repeat lipid panel summer 2024.    Orders:  -     metoprolol succinate (TOPROL-XL) 50 mg 24 hr tablet; Take 1 tablet (50 mg total) by mouth daily    4. Arachnoid cyst  Assessment & Plan:  Follows with [address] [place] [address] neurosurgery Dr. Lelo Kang and [facility] AT THE [facility] endocrinology Dr. Aishwarya Lozoya. Brain MRI 8/13 shows no visible cyst and lab work is stable. She has been released from endocrinology and will follow up with neurosurgery in 2 years with MRI at that time. 5. Stage 3a chronic kidney disease [facility])  Assessment & Plan:  Lab Results   Component Value Date    EGFR 39 07/15/2023    EGFR 45 01/30/2023    EGFR 43 01/17/2022    CREATININE 1.25 07/15/2023    CREATININE 1.12 01/30/2023    CREATININE 1.18 01/17/2022     Stable on recent labs. 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     She initially established with Dr. Franko Post on 9/29/2020 for evaluation of shortness of breath and tightness in chest when climbing steps. She was also experiencing heart racing. ECG at PCP office revealed rate controlled a fib. She was started on warfarin and metoprolol at that time. Her ECG at 9/29/2020 office visit showed normal sinus rhythm. ZIO monitoring, echocardiogram, and Lexiscan nuclear stress testing were ordered (full details below) with evidence of normal heart function and no scar or ischemia.     January 2022 she went for a walk while visiting family in [place] and she walked 1.7 miles. She had no pain during the walk but the next day developed severe R calf pain which persisted. She was evaluated at [facility] ER 1/16/2022. Venous duplex was negative for DVT. CT of the right leg demonstrated arthritic changes with an 8 mm intra-articular loose body. She was evaluated by orthopedic, Dr. Nataly Bruno with no further recommendations.  Her rosuvastatin was placed on hold at that time.      Oxana completed blood work through her PCP on 6/14/2022 with significantly elevated cholesterol: total cholesterol 379, triglycerides 309, HDL 71, . Her PCP instructed her to schedule an appointment to discuss alternate lipid lowering therapy. She met with me on 6/28/2022 and we agreed to restart her rosuvastatin 10 mg daily. She denied cardiac complaints at that office visit.     She reached out on 1/30/2023 to request an OV. She met with me on 2/1/2023 at which time she reported chest "fluttering" and "heaviness" that started over the weekend associated with profuse diarrhea. ECG showed NSR with HR in the 70's. She reported indigestion, acid reflux, worse with lying down. She denied exertional chest discomfort, lightheadedness, or near syncope. Her symptoms did improve quite rapidly. Lab work was ordered with stable kidney function and electrolytes. Pepcid was increased to 40 mg BID for 1 week for GERD treatment and close follow up arranged. She followed up 5/3/2023 at which time she was feeling very well.     8/24/2023: Colin Beckford presents for routine follow up. She underwent updated brain MRI and follow up with  neurosurgery 8/13. She met with her endocrinologist yesterday. Her brain MRI shows resolution of the cyst and her labs are stable. She was released from endocrine follow up. She will see neurosurgery with an MRI in 2 years. She states that the night before the MRI she had heart racing and chest tightness. This resolved so she did not reach out or seek medical attention. She has been having intermittent heart racing in the evening while resting for the past 2 weeks. No recurrent chest discomfort. She adamantly denies shortness of breath or dyspnea on exertion. No edema. No lightheadedness. She has not skipped any metoprolol doses. She states otherwise she feels very well. She brings a list of home BP readings ranging from 118-132/60's. HR in the 60's at home.     Medical Problems     Problem List     Atrial fibrillation [facility])    Essential hypertension Mixed hyperlipidemia    Chest discomfort    Leg pain    Gastroesophageal reflux disease without esophagitis    Stage 3a chronic kidney disease ([address])        Past Medical History:   Diagnosis Date   • Arachnoid cyst     of the sella   • Arthritis    • Atrial fibrillation (HCC)    • Cataract    • Depression    • Diverticular disease    • GERD (gastroesophageal reflux disease)    • Glaucoma    • Hyperlipidemia    • Hypertension    • Macular degeneration    • Polyarthropathy      Social History     Socioeconomic History   • Marital status:      Spouse name: Not on file   • Number of children: Not on file   • Years of education: Not on file   • Highest education level: Not on file   Occupational History   • Occupation: Retired Pharmacy Tech   Tobacco Use   • Smoking status: Never   • Smokeless tobacco: Never   Vaping Use   • Vaping Use: Never used   Substance and Sexual Activity   • Alcohol use:  Yes   • Drug use: Never   • Sexual activity: Not on file     Comment: Defer   Other Topics Concern   • Not on file   Social History Narrative   • Not on file     Social Determinants of Health     Financial Resource Strain: Not on file   Food Insecurity: Not on file   Transportation Needs: Not on file   Physical Activity: Not on file   Stress: Not on file   Social Connections: Not on file   Intimate Partner Violence: Not on file   Housing Stability: Not on file      Family History   Problem Relation Age of Onset   • Coronary artery disease Mother    • Colon cancer Father    • Coronary artery disease Son    • No Known Problems Son      Past Surgical History:   Procedure Laterality Date   • APPENDECTOMY     • CARDIAC CATHETERIZATION  1997   • CATARACT EXTRACTION, BILATERAL  2022   • CHOLECYSTECTOMY     • COLECTOMY     • DISCECTOMY SPINE CERVICAL ANTERIOR     • HYSTERECTOMY W/ SALPINGO-OOPHERECTOMY     • LUMBAR LAMINECTOMY     • OTHER SURGICAL HISTORY      spur removal   • OTHER SURGICAL HISTORY  05/06/2021    Trans sphenoid decompression of arachnoid cyst of the sella with subcutaneous fat packing   • SKIN LESION EXCISION  04/25/2023    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   • TONSILLECTOMY         Current Outpatient Medications:   •  ALPRAZolam (XANAX) 1 mg tablet, Take 1 mg by mouth daily as needed  , Disp: , Rfl:   •  Calcium Carbonate-Vit D-Min (CALTRATE 600+D PLUS PO), Caltrate 600 plus D  1 PO QD, Disp: , Rfl:   •  Cholecalciferol (VITAMIN D3 PO), Take by mouth, Disp: , Rfl:   •  famotidine (PEPCID) 20 mg tablet, TAKE 1 TABLET BY MOUTH TWICE A DAY, Disp: 180 tablet, Rfl: 4  •  losartan (COZAAR) 50 mg tablet, Take 50 mg by mouth daily, Disp: , Rfl:   •  Magnesium 250 MG TABS, , Disp: , Rfl:   •  meclizine (ANTIVERT) 25 mg tablet, meclizine 25 mg tablet  take 1 tablet by mouth three times a day if needed for dizziness, Disp: , Rfl:   •  metoprolol succinate (TOPROL-XL) 50 mg 24 hr tablet, Take 1 tablet (50 mg total) by mouth daily, Disp: 30 tablet, Rfl: 11  •  Multiple Vitamins-Minerals (CENTRUM SILVER PO), Centrum Silver  1 PO QD, Disp: , Rfl:   •  Multiple Vitamins-Minerals (PRESERVISION AREDS 2 PO), Take by mouth, Disp: , Rfl:   •  rosuvastatin (CRESTOR) 10 MG tablet, Take 1 tablet (10 mg total) by mouth daily, Disp: 90 tablet, Rfl: 3  •  warfarin (COUMADIN) 2 mg tablet, Take 2 mg by mouth daily , Disp: , Rfl:   Allergies   Allergen Reactions   • Metronidazole Anaphylaxis     Can Take Oral Only Had A Reaction To IV Flagyl     • Adhesive  [Medical Tape]    • Advil  [Ibuprofen]    • Antihistamines, Loratadine-Type    • Demerol [Meperidine]    • Ketorolac Tromethamine    • Lisinopril Cough   • Pantoprazole      severe reaction     • Tolmetin        Labs:     Chemistry        Component Value Date/Time    K 4.6 07/15/2023 0800     07/15/2023 0800    CO2 28 07/15/2023 0800    BUN 24 07/15/2023 0800    CREATININE 1.25 07/15/2023 0800        Component Value Date/Time    CALCIUM 9.4 07/15/2023 0800    ALKPHOS 72 07/15/2023 0800 AST 23 07/15/2023 0800    ALT 13 07/15/2023 0800        Lipid panel 7/15/2023: C 197. T 146. H 66. L 102.     Test Results:   ECG 1/30/2023: Normal sinus rhythm. Rate 79 bpm. Normal ECG.     ECG 1/11/2021: Normal sinus rhythm. Normal EKG.    Echocardiogram 10/27/2020:  Vigorous LV function with ejection fraction of 65-70%. Mild mitral regurgitation. Mild tricuspid regurgitation.  Estimated PASP 25 mmHg.      Lexiscan nuclear stress test 10/27/2020:   No evidence of myocardial scar or ischemia. EF 91%.      ZIO 9/30-10/14/2020: 14 days:  Min HR 49. Max  (sinus). Max  with SVT. Avg HR 65 bpm.   79 episodes of SVT with longest lasting 19.2 seconds. Some SVT may be AT with variable block. Frequent PAC's (6.8%). Rare PVC's (<1.0%).    ECG 9/29/2020: Normal sinus rhythm. Minimal voltage criteria for LVH which may be normal variant.      ECG 9/9/2020: Atrial fibrillation with controlled ventricular response.     Review of Systems   Constitutional: Negative. HENT: Negative. Cardiovascular: Positive for palpitations. Negative for chest pain, dyspnea on exertion, irregular heartbeat, leg swelling, near-syncope, orthopnea and syncope. Respiratory: Negative for cough and snoring. Endocrine: Negative. Skin: Negative. Musculoskeletal: Negative. Gastrointestinal: Negative. Genitourinary: Negative. Neurological: Negative. Psychiatric/Behavioral: Negative. Vitals:    08/24/23 1451   BP: 140/70   Pulse:    Temp:    SpO2:      Vitals:    08/24/23 1352   Weight: 60.9 kg (134 lb 3.2 oz)     Height: 5' 3" (160 cm)   Body mass index is 23.77 kg/m². Physical Exam  Vitals and nursing note reviewed. Constitutional:       General: She is not in acute distress. Appearance: She is well-developed. She is not diaphoretic. HENT:      Head: Normocephalic and atraumatic. Neck:      Thyroid: No thyromegaly. Vascular: No carotid bruit or JVD.    Cardiovascular:      Rate and Rhythm: Normal rate and regular rhythm. No extrasystoles are present. Pulses: Intact distal pulses. Radial pulses are 2+ on the right side and 2+ on the left side. Heart sounds: Normal heart sounds, S1 normal and S2 normal. No murmur heard. Comments: No edema  Pulmonary:      Effort: Pulmonary effort is normal.      Breath sounds: Normal breath sounds. Abdominal:      General: There is no distension. Palpations: Abdomen is soft. Tenderness: There is no abdominal tenderness. Musculoskeletal:         General: Normal range of motion. Cervical back: Normal range of motion and neck supple. Lymphadenopathy:      Cervical: No cervical adenopathy. Skin:     General: Skin is warm and dry. Neurological:      Mental Status: She is alert and oriented to person, place, and time. Cranial Nerves: No cranial nerve deficit.    Psychiatric:         Behavior: Behavior normal.

## 2023-08-24 NOTE — ASSESSMENT & PLAN NOTE
Atrial fibrillation diagnosed 9/9/2020. Echocardiogram 10/27/2020 was unremarkable. Nuclear lexiscan stress test 10/27/2020 was normal.  14 day ZIO showed no atrial fibrillation but did show 79 episodes of SVT with the longest lasting 19.2 seconds as well as frequent PAC's (6.8%). ? Atrial tachycardia on some strips. Declines sleep study. TSH WNL. Patient was started on anticoagulation with coumadin for CHADsVASc score  4. INR goal 2-3 (managed by PCP). Discussed possible transition to [address] such as eliquis/pradaxa/xarelto, however she deferred secondary to cost.  Currently in sinus rhythm. She has noticed breakthrough heart racing episodes. Will increase metoprolol succinate to 50 mg daily  7 day ZIO monitor ordered. Continue daily magnesium 250 mg daily.

## 2023-08-24 NOTE — ASSESSMENT & PLAN NOTE
Follows with [address] [place] [address] neurosurgery Dr. Sanjuana Chen and [facility] AT THE [facility] endocrinology Dr. Lilian Alejandre. Brain MRI 8/13 shows no visible cyst and lab work is stable. She has been released from endocrinology and will follow up with neurosurgery in 2 years with MRI at that time.

## 2023-08-24 NOTE — ASSESSMENT & PLAN NOTE
Lipid panel 7/15/2023: C 197. T 146. H 66. L 102. Continue rosuvastatin 10 mg daily. Repeat lipid panel summer 2024.

## 2023-09-26 ENCOUNTER — CLINICAL SUPPORT (OUTPATIENT)
Dept: CARDIOLOGY CLINIC | Facility: CLINIC | Age: 83
End: 2023-09-26
Payer: MEDICARE

## 2023-09-26 DIAGNOSIS — I48.0 PAROXYSMAL ATRIAL FIBRILLATION (HCC): ICD-10-CM

## 2023-09-26 PROCEDURE — 93248 EXT ECG>7D<15D REV&INTERPJ: CPT | Performed by: NURSE PRACTITIONER

## 2023-10-02 ENCOUNTER — TELEPHONE (OUTPATIENT)
Dept: CARDIOLOGY CLINIC | Facility: CLINIC | Age: 83
End: 2023-10-02

## 2023-10-02 NOTE — TELEPHONE ENCOUNTER
Please let her know that I am awaiting the final review by the cardiologist. Preliminary report shows predominantly sinus rhythm. Her triggers were associated with sinus rhythm in the 60-70's and with early beats from the top of the heart (premature atrial contractions). I recommend increasing her magnesium supplement if she can tolerate to 500 mg daily.

## 2023-10-09 NOTE — TELEPHONE ENCOUNTER
Patient called and stated that since that increase of her magnesium she has had severe diarrhea and she is going to go back to previous dosage because she "cannot live like this."    Patient can be reached at [phone]

## 2023-10-09 NOTE — TELEPHONE ENCOUNTER
Call to patient,  Instructed to decrease magnesium dose. Patient instructed to call office with any increase of palpitations.  Patient verbalized understanding

## 2023-10-09 NOTE — TELEPHONE ENCOUNTER
I am fine with her going back to the reduced dose of magnesium. Dr. Mary Hastings read her ZIO and said it showed sinus rhythm with a small amount of PAC's and PVC's. He said she can continue her current prescriptions. If she has a lot of palpitations we can increase her metoprolol. Thank you!

## 2023-10-13 DIAGNOSIS — E78.2 MIXED HYPERLIPIDEMIA: ICD-10-CM

## 2023-10-13 RX ORDER — ROSUVASTATIN CALCIUM 10 MG/1
10 TABLET, COATED ORAL DAILY
Qty: 90 TABLET | Refills: 3 | Status: SHIPPED | OUTPATIENT
Start: 2023-10-13

## 2023-10-23 ENCOUNTER — TELEPHONE (OUTPATIENT)
Dept: CARDIOLOGY CLINIC | Facility: CLINIC | Age: 83
End: 2023-10-23

## 2023-10-23 DIAGNOSIS — I10 ESSENTIAL HYPERTENSION: ICD-10-CM

## 2023-10-23 DIAGNOSIS — I48.0 PAROXYSMAL ATRIAL FIBRILLATION (HCC): ICD-10-CM

## 2023-10-23 DIAGNOSIS — E78.2 MIXED HYPERLIPIDEMIA: ICD-10-CM

## 2023-10-23 RX ORDER — METOPROLOL SUCCINATE 50 MG/1
50 TABLET, EXTENDED RELEASE ORAL DAILY
Qty: 30 TABLET | Refills: 11 | Status: SHIPPED | OUTPATIENT
Start: 2023-10-23

## 2023-10-23 NOTE — TELEPHONE ENCOUNTER
Patient called and left message on office voicemail to have her metoprolol change    Patient did not state what she wanted it to be changed to    Patient can be reached at [phone]

## 2023-10-23 NOTE — TELEPHONE ENCOUNTER
She is doing well on the 50 mg, so I sent a refill to the pharm as the last one did not look to have gone through. Pt is aware.

## 2023-11-15 DIAGNOSIS — E78.2 MIXED HYPERLIPIDEMIA: ICD-10-CM

## 2023-11-15 DIAGNOSIS — I48.0 PAROXYSMAL ATRIAL FIBRILLATION (HCC): ICD-10-CM

## 2023-11-15 DIAGNOSIS — I10 ESSENTIAL HYPERTENSION: ICD-10-CM

## 2023-11-15 RX ORDER — METOPROLOL SUCCINATE 50 MG/1
50 TABLET, EXTENDED RELEASE ORAL DAILY
Qty: 90 TABLET | Refills: 3 | Status: SHIPPED | OUTPATIENT
Start: 2023-11-15

## 2023-11-15 NOTE — TELEPHONE ENCOUNTER
Pt called and said that she is going away for 1 month she would like to know if David Núñez can call her pharm to re-new her Metoprolol for 90 days before she goes.   Please call pt [phone]

## 2024-02-22 ENCOUNTER — APPOINTMENT (OUTPATIENT)
Dept: LAB | Facility: HOSPITAL | Age: 84
End: 2024-02-22
Payer: MEDICARE

## 2024-02-22 ENCOUNTER — OFFICE VISIT (OUTPATIENT)
Dept: CARDIOLOGY CLINIC | Facility: CLINIC | Age: 84
End: 2024-02-22
Payer: MEDICARE

## 2024-02-22 VITALS
BODY MASS INDEX: 23.5 KG/M2 | DIASTOLIC BLOOD PRESSURE: 68 MMHG | SYSTOLIC BLOOD PRESSURE: 150 MMHG | HEIGHT: 63 IN | WEIGHT: 132.6 LBS | TEMPERATURE: 97.2 F | HEART RATE: 71 BPM | OXYGEN SATURATION: 100 %

## 2024-02-22 DIAGNOSIS — I48.0 PAROXYSMAL ATRIAL FIBRILLATION (HCC): Primary | ICD-10-CM

## 2024-02-22 DIAGNOSIS — D64.9 ANEMIA, UNSPECIFIED TYPE: ICD-10-CM

## 2024-02-22 DIAGNOSIS — I10 ESSENTIAL HYPERTENSION: ICD-10-CM

## 2024-02-22 DIAGNOSIS — E78.2 MIXED HYPERLIPIDEMIA: ICD-10-CM

## 2024-02-22 DIAGNOSIS — R73.01 IMPAIRED FASTING GLUCOSE: ICD-10-CM

## 2024-02-22 DIAGNOSIS — G93.0 ARACHNOID CYST: ICD-10-CM

## 2024-02-22 DIAGNOSIS — E55.9 VITAMIN D DEFICIENCY: ICD-10-CM

## 2024-02-22 DIAGNOSIS — N18.31 STAGE 3A CHRONIC KIDNEY DISEASE (HCC): ICD-10-CM

## 2024-02-22 DIAGNOSIS — I48.0 PAROXYSMAL ATRIAL FIBRILLATION (HCC): ICD-10-CM

## 2024-02-22 PROBLEM — N18.32 STAGE 3B CHRONIC KIDNEY DISEASE (CKD) (HCC): Status: RESOLVED | Noted: 2024-02-22 | Resolved: 2024-02-22

## 2024-02-22 PROBLEM — N18.32 STAGE 3B CHRONIC KIDNEY DISEASE (CKD) (HCC): Status: ACTIVE | Noted: 2024-02-22

## 2024-02-22 LAB
25(OH)D3 SERPL-MCNC: 69.1 NG/ML (ref 30–100)
ALBUMIN SERPL BCP-MCNC: 4.5 G/DL (ref 3.5–5)
ALP SERPL-CCNC: 69 U/L (ref 34–104)
ALT SERPL W P-5'-P-CCNC: 14 U/L (ref 7–52)
ANION GAP SERPL CALCULATED.3IONS-SCNC: 7 MMOL/L
AST SERPL W P-5'-P-CCNC: 23 U/L (ref 13–39)
BILIRUB SERPL-MCNC: 0.42 MG/DL (ref 0.2–1)
BUN SERPL-MCNC: 26 MG/DL (ref 5–25)
CALCIUM SERPL-MCNC: 9.8 MG/DL (ref 8.4–10.2)
CHLORIDE SERPL-SCNC: 104 MMOL/L (ref 96–108)
CHOLEST SERPL-MCNC: 209 MG/DL
CO2 SERPL-SCNC: 28 MMOL/L (ref 21–32)
CREAT SERPL-MCNC: 1.2 MG/DL (ref 0.6–1.3)
ERYTHROCYTE [DISTWIDTH] IN BLOOD BY AUTOMATED COUNT: 13.3 % (ref 11.6–15.1)
EST. AVERAGE GLUCOSE BLD GHB EST-MCNC: 117 MG/DL
GFR SERPL CREATININE-BSD FRML MDRD: 41 ML/MIN/1.73SQ M
GLUCOSE SERPL-MCNC: 125 MG/DL (ref 65–140)
HBA1C MFR BLD: 5.7 %
HCT VFR BLD AUTO: 39.9 % (ref 34.8–46.1)
HDLC SERPL-MCNC: 68 MG/DL
HGB BLD-MCNC: 13.1 G/DL (ref 11.5–15.4)
LDLC SERPL CALC-MCNC: 113 MG/DL (ref 0–100)
MAGNESIUM SERPL-MCNC: 2.2 MG/DL (ref 1.9–2.7)
MCH RBC QN AUTO: 32.5 PG (ref 26.8–34.3)
MCHC RBC AUTO-ENTMCNC: 32.8 G/DL (ref 31.4–37.4)
MCV RBC AUTO: 99 FL (ref 82–98)
PLATELET # BLD AUTO: 297 THOUSANDS/UL (ref 149–390)
PMV BLD AUTO: 10 FL (ref 8.9–12.7)
POTASSIUM SERPL-SCNC: 4.3 MMOL/L (ref 3.5–5.3)
PROT SERPL-MCNC: 7.6 G/DL (ref 6.4–8.4)
RBC # BLD AUTO: 4.03 MILLION/UL (ref 3.81–5.12)
SODIUM SERPL-SCNC: 139 MMOL/L (ref 135–147)
TRIGL SERPL-MCNC: 138 MG/DL
TSH SERPL DL<=0.05 MIU/L-ACNC: 2.29 UIU/ML (ref 0.45–4.5)
WBC # BLD AUTO: 8.39 THOUSAND/UL (ref 4.31–10.16)

## 2024-02-22 PROCEDURE — 82728 ASSAY OF FERRITIN: CPT

## 2024-02-22 PROCEDURE — 99214 OFFICE O/P EST MOD 30 MIN: CPT | Performed by: NURSE PRACTITIONER

## 2024-02-22 PROCEDURE — 83735 ASSAY OF MAGNESIUM: CPT | Performed by: NURSE PRACTITIONER

## 2024-02-22 PROCEDURE — 36415 COLL VENOUS BLD VENIPUNCTURE: CPT | Performed by: NURSE PRACTITIONER

## 2024-02-22 PROCEDURE — 83036 HEMOGLOBIN GLYCOSYLATED A1C: CPT | Performed by: NURSE PRACTITIONER

## 2024-02-22 PROCEDURE — 83540 ASSAY OF IRON: CPT

## 2024-02-22 PROCEDURE — 85027 COMPLETE CBC AUTOMATED: CPT | Performed by: NURSE PRACTITIONER

## 2024-02-22 PROCEDURE — 83550 IRON BINDING TEST: CPT

## 2024-02-22 PROCEDURE — 84443 ASSAY THYROID STIM HORMONE: CPT

## 2024-02-22 PROCEDURE — 82306 VITAMIN D 25 HYDROXY: CPT | Performed by: NURSE PRACTITIONER

## 2024-02-22 PROCEDURE — 80053 COMPREHEN METABOLIC PANEL: CPT

## 2024-02-22 PROCEDURE — 80061 LIPID PANEL: CPT

## 2024-02-22 NOTE — ASSESSMENT & PLAN NOTE
Lab Results   Component Value Date    EGFR 39 07/15/2023    EGFR 45 01/30/2023    EGFR 43 01/17/2022    CREATININE 1.25 07/15/2023    CREATININE 1.12 01/30/2023    CREATININE 1.18 01/17/2022     Recheck now.

## 2024-02-22 NOTE — ASSESSMENT & PLAN NOTE
BP is borderline today.  Excellent on home readings (home BP cuff accuracy has been confirmed).  Continue losartan 50 mg daily and metoprolol succinate 50 mg daily.  Continue to monitor at home and report readings persistently > 145/90.  Updated lab work ordered.

## 2024-02-22 NOTE — PATIENT INSTRUCTIONS
Labs today - I will send to Dr. Dean  Your ZIO monitor showed no concerning findings. You were in sinus rhythm the whole time. Your symptoms were associated with premature atrial contractions.  If you notice palpitations frequently we can adjust your metoprolol.  Check your insurance coverage for this year on cost of Eliquis and Xarelto

## 2024-02-22 NOTE — ASSESSMENT & PLAN NOTE
Lipid panel 7/15/2023: C 197. T 146. H 66. L 102.  Continue rosuvastatin 10 mg daily.  Recheck lipid panel now

## 2024-02-22 NOTE — ASSESSMENT & PLAN NOTE
Atrial fibrillation diagnosed 9/9/2020.  Echocardiogram 10/27/2020 was unremarkable.  Nuclear lexiscan stress test 10/27/2020 was normal.  14 day ZIO showed no atrial fibrillation but did show 79 episodes of SVT with the longest lasting 19.2 seconds as well as frequent PAC's (6.8%).  ? Atrial tachycardia on some strips.  Repeat 1 week ZIO 8/30-9/6/23 showed no atrial fibrillation with 28 runs of SVT, longest 16 beats and rare PAC's.  Declines sleep study.  Will recheck TSH.  Patient was started on anticoagulation with coumadin for CHADsVASc score  4.  INR goal 2-3 (managed by PCP).  Discussed possible transition to DOAC such as eliquis/pradaxa/xarelto, has been expensive in the past - will check cost.  Continue metoprolol succinate 50 mg daily.  Continue daily magnesium 250 mg daily.

## 2024-02-22 NOTE — PROGRESS NOTES
Cardiology Follow Up    Oxana Spotts  1940  [id number]  [facility] [place] [address]  [address] FLO[address]  [facility] [id number] [phone]    Oxana presents for routine follow up of paroxysmal atrial fibrillation, hypertension, hyperlipidemia.     1. Paroxysmal atrial fibrillation (HCC)  Assessment & Plan:  Atrial fibrillation diagnosed 9/9/2020.  Echocardiogram 10/27/2020 was unremarkable.  Nuclear lexiscan stress test 10/27/2020 was normal.  14 day ZIO showed no atrial fibrillation but did show 79 episodes of SVT with the longest lasting 19.2 seconds as well as frequent PAC's (6.8%).  ? Atrial tachycardia on some strips.  Repeat 1 week ZIO 8/30-9/6/23 showed no atrial fibrillation with 28 runs of SVT, longest 16 beats and rare PAC's.  Declines sleep study.  Will recheck TSH.  Patient was started on anticoagulation with coumadin for CHADsVASc score  4.  INR goal 2-3 (managed by PCP).  Discussed possible transition to DOAC such as eliquis/pradaxa/xarelto, has been expensive in the past - will check cost.  Continue metoprolol succinate 50 mg daily.  Continue daily magnesium 250 mg daily.    Orders:  -     Magnesium  -     TSH, 3rd generation with Free T4 reflex; Future  -     CBC and Platelet    2. Essential hypertension  Assessment & Plan:  BP is borderline today.  Excellent on home readings (home BP cuff accuracy has been confirmed).  Continue losartan 50 mg daily and metoprolol succinate 50 mg daily.  Continue to monitor at home and report readings persistently > 145/90.  Updated lab work ordered.    Orders:  -     CBC and Platelet    3. Mixed hyperlipidemia  Assessment & Plan:  Lipid panel 7/15/2023: C 197. T 146. H 66. L 102.  Continue rosuvastatin 10 mg daily.  Recheck lipid panel now    Orders:  -     Comprehensive metabolic panel; Future  -     Lipid Panel with Direct LDL reflex; Future    4. Stage 3a chronic kidney disease (HCC)  Assessment & Plan:  Lab  Results   Component Value Date    EGFR 39 07/15/2023    EGFR 45 01/30/2023    EGFR 43 01/17/2022    CREATININE 1.25 07/15/2023    CREATININE 1.12 01/30/2023    CREATININE 1.18 01/17/2022     Recheck now.      5. Impaired fasting glucose  Comments:  check HA1c  Orders:  -     HEMOGLOBIN A1C W/ EAG ESTIMATION    6. Vitamin D deficiency  Comments:  check vitamin D  Orders:  -     Vitamin D 25 hydroxy    7. Anemia, unspecified type  Comments:  check CBC and iron  Orders:  -     Iron Panel (Includes Ferritin, Iron Sat%, Iron, and TIBC); Future    8. Arachnoid cyst  Assessment & Plan:  Follows with [facility] neurosurgery Dr. Christy and [facility] endocrinology Dr. Rondon. Brain MRI 8/13/23 shows no visible cyst and lab work is stable.  She has been released from endocrinology and will follow up with neurosurgery in 2 years with MRI at that time.         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     She initially established with Dr. Lugo on 9/29/2020 for evaluation of shortness of breath and tightness in chest when climbing steps. She was also experiencing heart racing. ECG at PCP office revealed rate controlled a fib. She was started on warfarin and metoprolol at that time. Her ECG at 9/29/2020 office visit showed normal sinus rhythm. ZIO monitoring, echocardiogram, and Lexiscan nuclear stress testing were ordered (full details below) with evidence of normal heart function and no scar or ischemia.     January 2022 she went for a walk while visiting family in [place] and she walked 1.7 miles. She had no pain during the walk but the next day developed severe R calf pain which persisted. She was evaluated at [facility] 1/16/2022. Venous duplex was negative for DVT. CT of the right leg demonstrated arthritic changes with an 8 mm intra-articular loose body. She was evaluated by orthopedic, Dr. Barron with no  "further recommendations. Her rosuvastatin was placed on hold at that time.      Oxana completed blood work through her PCP on 6/14/2022 with significantly elevated cholesterol: total cholesterol 379, triglycerides 309, HDL 71, . Her PCP instructed her to schedule an appointment to discuss alternate lipid lowering therapy. She met with me on 6/28/2022 and we agreed to restart her rosuvastatin 10 mg daily. She denied cardiac complaints at that office visit.     She reached out on 1/30/2023 to request an OV. She met with me on 2/1/2023 at which time she reported chest \"fluttering\" and \"heaviness\" that started over the weekend associated with profuse diarrhea. ECG showed NSR with HR in the 70's. She reported indigestion, acid reflux, worse with lying down. She denied exertional chest discomfort, lightheadedness, or near syncope. Her symptoms did improve quite rapidly. Lab work was ordered with stable kidney function and electrolytes. Pepcid was increased to 40 mg BID for 1 week for GERD treatment and close follow up arranged.    At most recent follow up 8/24/2023 she had undergone a brain MRI and met with [facility] neurosurgery on 8/13/2023 and endocrinology follow up 8/23/2023. Her cyst had resolved and neurosurgery planned to repeat an MRI in 2 years. She reported intermittent heart racing episodes while at rest for the past 2 weeks. No chest discomfort. Her metoprolol was increased to 50 mg daily and a 1 week ZIO monitor was ordered.    ZIO 8/30-9/6/2023 showed sinus rhythm with avg HR 62 bpm. 28 short runs of SVT, longest 16 beats. Triggers were associated with SR with PACs.    2/22/2024: Oxana presents for follow up. We reviewed her ZIO results in detail. She states her palpitations have been very well controlled. She denies chest pain, shortness of breath, lightheadedness, edema. Only complaint is fatigue. She states she is not sleeping well at night currently. She is due for lab work and requests it be done " today. She has been monitoring her BP at home which has been running 110-130/60-70's. She is taking her medications as prescribed. No bleeding issues. PCP still manages INR which she states has been fluctuating.     Medical Problems       Problem List       Atrial fibrillation (HCC)    Essential hypertension    Mixed hyperlipidemia    Chest discomfort    Leg pain    Gastroesophageal reflux disease without esophagitis    Stage 3a chronic kidney disease (HCC)    Arachnoid cyst        Past Medical History:   Diagnosis Date    Arachnoid cyst     of the sella    Arthritis     Atrial fibrillation (HCC)     Cataract     Depression     Diverticular disease     GERD (gastroesophageal reflux disease)     Glaucoma     Hyperlipidemia     Hypertension     Macular degeneration     Polyarthropathy      Social History     Socioeconomic History    Marital status:      Spouse name: Not on file    Number of children: Not on file    Years of education: Not on file    Highest education level: Not on file   Occupational History    Occupation: Retired Pharmacy Tech   Tobacco Use    Smoking status: Never    Smokeless tobacco: Never   Vaping Use    Vaping status: Never Used   Substance and Sexual Activity    Alcohol use: Yes    Drug use: Never    Sexual activity: Not on file     Comment: Defer   Other Topics Concern    Not on file   Social History Narrative    Not on file     Social Determinants of Health     Financial Resource Strain: Not on file   Food Insecurity: Not on file   Transportation Needs: Not on file   Physical Activity: Not on file   Stress: Not on file   Social Connections: Not on file   Intimate Partner Violence: Not on file   Housing Stability: Not on file      Family History   Problem Relation Age of Onset    Coronary artery disease Mother     Colon cancer Father     Coronary artery disease Son     No Known Problems Son      Past Surgical History:   Procedure Laterality Date    APPENDECTOMY      CARDIAC  CATHETERIZATION  1997    CATARACT EXTRACTION, BILATERAL  2022    CHOLECYSTECTOMY      COLECTOMY      DISCECTOMY SPINE CERVICAL ANTERIOR      HYSTERECTOMY W/ SALPINGO-OOPHERECTOMY      LUMBAR LAMINECTOMY      OTHER SURGICAL HISTORY      spur removal    OTHER SURGICAL HISTORY  05/06/2021    Trans sphenoid decompression of arachnoid cyst of the sella with subcutaneous fat packing    SKIN LESION EXCISION  04/25/2023    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    TONSILLECTOMY         Current Outpatient Medications:     ALPRAZolam (XANAX) 1 mg tablet, Take 1 mg by mouth daily as needed  , Disp: , Rfl:     Calcium Carbonate-Vit D-Min (CALTRATE 600+D PLUS PO), Caltrate 600 plus D  1 PO QD, Disp: , Rfl:     Cholecalciferol (VITAMIN D3 PO), Take by mouth, Disp: , Rfl:     famotidine (PEPCID) 20 mg tablet, TAKE 1 TABLET BY MOUTH TWICE A DAY, Disp: 180 tablet, Rfl: 4    losartan (COZAAR) 50 mg tablet, Take 50 mg by mouth daily, Disp: , Rfl:     Magnesium 250 MG TABS, , Disp: , Rfl:     meclizine (ANTIVERT) 25 mg tablet, meclizine 25 mg tablet  take 1 tablet by mouth three times a day if needed for dizziness, Disp: , Rfl:     metoprolol succinate (TOPROL-XL) 50 mg 24 hr tablet, Take 1 tablet (50 mg total) by mouth daily, Disp: 90 tablet, Rfl: 3    Multiple Vitamins-Minerals (CENTRUM SILVER PO), Centrum Silver  1 PO QD, Disp: , Rfl:     Multiple Vitamins-Minerals (PRESERVISION AREDS 2 PO), Take by mouth, Disp: , Rfl:     rosuvastatin (CRESTOR) 10 MG tablet, TAKE 1 TABLET BY MOUTH EVERY DAY, Disp: 90 tablet, Rfl: 3    warfarin (COUMADIN) 2 mg tablet, Take 2 mg by mouth daily , Disp: , Rfl:   Allergies   Allergen Reactions    Metronidazole Anaphylaxis     Can Take Oral Only Had A Reaction To IV Flagyl      Adhesive  [Medical Tape]     Advil  [Ibuprofen]     Antihistamines, Loratadine-Type     Demerol [Meperidine]     Ketorolac Tromethamine     Lisinopril Cough    Pantoprazole      severe reaction      Tolmetin        Labs:      Chemistry        Component Value Date/Time    K 4.6 07/15/2023 0800    K 4.0 07/09/2021 0848     07/15/2023 0800     07/09/2021 0848    CO2 28 07/15/2023 0800    CO2 26 07/09/2021 0848    BUN 24 07/15/2023 0800    BUN 21 07/09/2021 0848    CREATININE 1.25 07/15/2023 0800    CREATININE 0.98 07/09/2021 0848        Component Value Date/Time    CALCIUM 9.4 07/15/2023 0800    CALCIUM 8.9 07/09/2021 0848    ALKPHOS 72 07/15/2023 0800    ALKPHOS 99 07/09/2021 0848    AST 23 07/15/2023 0800    AST 19 07/09/2021 0848    ALT 13 07/15/2023 0800    ALT 18 07/09/2021 0848        Lipid panel 7/15/2023: C 197. T 146. H 66. L 102.     Test Results:   O 8/30-9/6/2023:  Predominantly sinus rhythm, HR  (SVT), avg 62 bpm.  One 7-beat NSVT. 28 runs of SVT, longest 16 beats.  Rare PAC's/Rare PVC's.  16 triggers were associated with sinus rhythm and sinus rhythm with PAC's.    ECG 1/30/2023: Normal sinus rhythm. Rate 79 bpm. Normal ECG.     ECG 1/11/2021: Normal sinus rhythm. Normal EKG.     Echocardiogram 10/27/2020:  Vigorous LV function with ejection fraction of 65-70%.  Mild mitral regurgitation.  Mild tricuspid regurgitation.  Estimated PASP 25 mmHg.      Lexiscan nuclear stress test 10/27/2020:   No evidence of myocardial scar or ischemia. EF 91%.      ZIO 9/30-10/14/2020: 14 days:  Min HR 49. Max  (sinus). Max  with SVT. Avg HR 65 bpm.   79 episodes of SVT with longest lasting 19.2 seconds.   Some SVT may be AT with variable block.   Frequent PAC's (6.8%). Rare PVC's (<1.0%).      ECG 9/29/2020: Normal sinus rhythm. Minimal voltage criteria for LVH which may be normal variant.      ECG 9/9/2020: Atrial fibrillation with controlled ventricular response.     Review of Systems   Constitutional: Positive for malaise/fatigue.   HENT: Negative.     Cardiovascular:  Negative for chest pain, dyspnea on exertion, irregular heartbeat, leg swelling, near-syncope, orthopnea and palpitations.   Respiratory:   "Negative for cough and snoring.    Endocrine: Negative.    Skin: Negative.    Musculoskeletal: Negative.    Gastrointestinal: Negative.    Genitourinary: Negative.    Neurological: Negative.    Psychiatric/Behavioral:  The patient has insomnia.        Vitals:    02/22/24 1356   BP: 150/68   Pulse: 71   Temp: (!) 97.2 °F (36.2 °C)   SpO2: 100%     Vitals:    02/22/24 1356   Weight: 60.1 kg (132 lb 9.6 oz)     Height: 5' 3\" (160 cm)   Body mass index is 23.49 kg/m².    Physical Exam  Vitals and nursing note reviewed.   Constitutional:       General: She is not in acute distress.     Appearance: She is well-developed. She is not diaphoretic.   HENT:      Head: Normocephalic and atraumatic.   Neck:      Thyroid: No thyromegaly.      Vascular: No carotid bruit or JVD.   Cardiovascular:      Rate and Rhythm: Normal rate and regular rhythm. No extrasystoles are present.     Pulses: Intact distal pulses.           Radial pulses are 2+ on the right side and 2+ on the left side.      Heart sounds: Normal heart sounds, S1 normal and S2 normal. No murmur heard.     Comments: No edema  Pulmonary:      Effort: Pulmonary effort is normal.      Breath sounds: Normal breath sounds.   Abdominal:      General: There is no distension.      Palpations: Abdomen is soft.      Tenderness: There is no abdominal tenderness.   Musculoskeletal:         General: Normal range of motion.      Cervical back: Normal range of motion and neck supple.   Lymphadenopathy:      Cervical: No cervical adenopathy.   Skin:     General: Skin is warm and dry.   Neurological:      Mental Status: She is alert and oriented to person, place, and time.      Cranial Nerves: No cranial nerve deficit.   Psychiatric:         Behavior: Behavior normal.                "

## 2024-02-22 NOTE — ASSESSMENT & PLAN NOTE
Follows with [facility] neurosurgery Dr. Christy and [facility] endocrinology Dr. Rondon. Brain MRI 8/13/23 shows no visible cyst and lab work is stable.  She has been released from endocrinology and will follow up with neurosurgery in 2 years with MRI at that time.

## 2024-02-28 ENCOUNTER — TELEPHONE (OUTPATIENT)
Dept: CARDIOLOGY CLINIC | Facility: CLINIC | Age: 84
End: 2024-02-28

## 2024-02-28 NOTE — TELEPHONE ENCOUNTER
"----- Message from CHE Amin sent at 2/28/2024  6:50 AM EST -----  It looks like Oxana did not get my result note in her portal. Can you please ensure she gets this message:  \"Your vitamin D came back excellent! Your diabetes test Ha1c was borderline for pre-diabetes but you do not have diabetes. I would continue to control how much sugar you are eating in a day :) Your blood count is stable. It looks like the lab did not draw the iron panel for some reason.\"    "

## 2024-03-07 ENCOUNTER — APPOINTMENT (OUTPATIENT)
Dept: LAB | Facility: HOSPITAL | Age: 84
End: 2024-03-07

## 2024-03-07 DIAGNOSIS — D64.9 ANEMIA, UNSPECIFIED TYPE: ICD-10-CM

## 2024-03-07 LAB
FERRITIN SERPL-MCNC: 33 NG/ML (ref 11–307)
IRON SATN MFR SERPL: 29 % (ref 15–50)
IRON SERPL-MCNC: 82 UG/DL (ref 50–212)
TIBC SERPL-MCNC: 282 UG/DL (ref 250–450)
UIBC SERPL-MCNC: 200 UG/DL (ref 155–355)

## 2024-03-07 PROCEDURE — 36415 COLL VENOUS BLD VENIPUNCTURE: CPT

## 2024-05-02 ENCOUNTER — HOSPITAL ENCOUNTER (EMERGENCY)
Facility: HOSPITAL | Age: 84
Discharge: HOME | End: 2024-05-02
Attending: EMERGENCY MEDICINE
Payer: MEDICARE

## 2024-05-02 VITALS
HEIGHT: 63 IN | BODY MASS INDEX: 24.02 KG/M2 | RESPIRATION RATE: 28 BRPM | TEMPERATURE: 98.6 F | DIASTOLIC BLOOD PRESSURE: 82 MMHG | WEIGHT: 135.58 LBS | HEART RATE: 82 BPM | SYSTOLIC BLOOD PRESSURE: 155 MMHG | OXYGEN SATURATION: 97 %

## 2024-05-02 DIAGNOSIS — E78.2 MIXED HYPERLIPIDEMIA: ICD-10-CM

## 2024-05-02 DIAGNOSIS — T59.811A SMOKE INHALATION: Primary | ICD-10-CM

## 2024-05-02 DIAGNOSIS — K21.9 GASTROESOPHAGEAL REFLUX DISEASE WITHOUT ESOPHAGITIS: ICD-10-CM

## 2024-05-02 DIAGNOSIS — F41.9 ANXIETY: ICD-10-CM

## 2024-05-02 DIAGNOSIS — I10 ESSENTIAL HYPERTENSION: ICD-10-CM

## 2024-05-02 DIAGNOSIS — I48.0 PAROXYSMAL ATRIAL FIBRILLATION (HCC): ICD-10-CM

## 2024-05-02 LAB
ANION GAP SERPL CALCULATED.3IONS-SCNC: 8 MMOL/L (ref 4–13)
BASOPHILS # BLD AUTO: 0.03 THOUSANDS/ÂΜL (ref 0–0.1)
BASOPHILS NFR BLD AUTO: 0 % (ref 0–1)
BUN SERPL-MCNC: 25 MG/DL (ref 5–25)
CALCIUM SERPL-MCNC: 8.8 MG/DL (ref 8.4–10.2)
CHLORIDE SERPL-SCNC: 108 MMOL/L (ref 96–108)
CO2 SERPL-SCNC: 23 MMOL/L (ref 21–32)
CREAT SERPL-MCNC: 1.32 MG/DL (ref 0.6–1.3)
EOSINOPHIL # BLD AUTO: 0.08 THOUSAND/ÂΜL (ref 0–0.61)
EOSINOPHIL NFR BLD AUTO: 1 % (ref 0–6)
ERYTHROCYTE [DISTWIDTH] IN BLOOD BY AUTOMATED COUNT: 13.2 % (ref 11.6–15.1)
GAS + CO PNL BLDA: 5.5 % (ref 0–1.5)
GFR SERPL CREATININE-BSD FRML MDRD: 37 ML/MIN/1.73SQ M
GLUCOSE SERPL-MCNC: 190 MG/DL (ref 65–140)
HCT VFR BLD AUTO: 38.8 % (ref 34.8–46.1)
HGB BLD-MCNC: 12.6 G/DL (ref 11.5–15.4)
IMM GRANULOCYTES # BLD AUTO: 0.02 THOUSAND/UL (ref 0–0.2)
IMM GRANULOCYTES NFR BLD AUTO: 0 % (ref 0–2)
LYMPHOCYTES # BLD AUTO: 2.06 THOUSANDS/ÂΜL (ref 0.6–4.47)
LYMPHOCYTES NFR BLD AUTO: 24 % (ref 14–44)
MCH RBC QN AUTO: 32.3 PG (ref 26.8–34.3)
MCHC RBC AUTO-ENTMCNC: 32.5 G/DL (ref 31.4–37.4)
MCV RBC AUTO: 100 FL (ref 82–98)
MONOCYTES # BLD AUTO: 0.81 THOUSAND/ÂΜL (ref 0.17–1.22)
MONOCYTES NFR BLD AUTO: 9 % (ref 4–12)
NEUTROPHILS # BLD AUTO: 5.68 THOUSANDS/ÂΜL (ref 1.85–7.62)
NEUTS SEG NFR BLD AUTO: 66 % (ref 43–75)
NRBC BLD AUTO-RTO: 0 /100 WBCS
PLATELET # BLD AUTO: 237 THOUSANDS/UL (ref 149–390)
PMV BLD AUTO: 10.1 FL (ref 8.9–12.7)
POTASSIUM SERPL-SCNC: 4 MMOL/L (ref 3.5–5.3)
RBC # BLD AUTO: 3.9 MILLION/UL (ref 3.81–5.12)
SODIUM SERPL-SCNC: 139 MMOL/L (ref 135–147)
WBC # BLD AUTO: 8.68 THOUSAND/UL (ref 4.31–10.16)

## 2024-05-02 PROCEDURE — 36415 COLL VENOUS BLD VENIPUNCTURE: CPT | Performed by: EMERGENCY MEDICINE

## 2024-05-02 PROCEDURE — 80048 BASIC METABOLIC PNL TOTAL CA: CPT | Performed by: EMERGENCY MEDICINE

## 2024-05-02 PROCEDURE — 99285 EMERGENCY DEPT VISIT HI MDM: CPT | Performed by: EMERGENCY MEDICINE

## 2024-05-02 PROCEDURE — 85025 COMPLETE CBC W/AUTO DIFF WBC: CPT | Performed by: EMERGENCY MEDICINE

## 2024-05-02 PROCEDURE — 96375 TX/PRO/DX INJ NEW DRUG ADDON: CPT

## 2024-05-02 PROCEDURE — 99283 EMERGENCY DEPT VISIT LOW MDM: CPT

## 2024-05-02 PROCEDURE — 82375 ASSAY CARBOXYHB QUANT: CPT | Performed by: EMERGENCY MEDICINE

## 2024-05-02 PROCEDURE — 96374 THER/PROPH/DIAG INJ IV PUSH: CPT

## 2024-05-02 RX ORDER — ONDANSETRON 2 MG/ML
4 INJECTION INTRAMUSCULAR; INTRAVENOUS ONCE
Status: COMPLETED | OUTPATIENT
Start: 2024-05-02 | End: 2024-05-02

## 2024-05-02 RX ORDER — WARFARIN SODIUM 2 MG/1
2 TABLET ORAL DAILY
Qty: 30 TABLET | Refills: 0 | Status: SHIPPED | OUTPATIENT
Start: 2024-05-02 | End: 2024-06-01

## 2024-05-02 RX ORDER — FAMOTIDINE 10 MG/ML
20 INJECTION, SOLUTION INTRAVENOUS ONCE
Status: COMPLETED | OUTPATIENT
Start: 2024-05-02 | End: 2024-05-02

## 2024-05-02 RX ORDER — LORAZEPAM 2 MG/ML
1 INJECTION INTRAMUSCULAR ONCE
Status: COMPLETED | OUTPATIENT
Start: 2024-05-02 | End: 2024-05-02

## 2024-05-02 RX ORDER — ALPRAZOLAM 0.5 MG/1
1 TABLET ORAL ONCE
Status: COMPLETED | OUTPATIENT
Start: 2024-05-02 | End: 2024-05-02

## 2024-05-02 RX ORDER — FAMOTIDINE 20 MG/1
20 TABLET, FILM COATED ORAL DAILY
Qty: 30 TABLET | Refills: 0 | Status: SHIPPED | OUTPATIENT
Start: 2024-05-02 | End: 2024-06-01

## 2024-05-02 RX ORDER — ROSUVASTATIN CALCIUM 10 MG/1
10 TABLET, COATED ORAL DAILY
Qty: 90 TABLET | Refills: 0 | Status: SHIPPED | OUTPATIENT
Start: 2024-05-02

## 2024-05-02 RX ORDER — METOPROLOL TARTRATE 1 MG/ML
2.5 INJECTION, SOLUTION INTRAVENOUS ONCE
Status: COMPLETED | OUTPATIENT
Start: 2024-05-02 | End: 2024-05-02

## 2024-05-02 RX ORDER — METOPROLOL SUCCINATE 50 MG/1
50 TABLET, EXTENDED RELEASE ORAL DAILY
Qty: 30 TABLET | Refills: 0 | Status: SHIPPED | OUTPATIENT
Start: 2024-05-02 | End: 2024-05-07 | Stop reason: SDUPTHER

## 2024-05-02 RX ORDER — ALPRAZOLAM 1 MG/1
1 TABLET ORAL DAILY PRN
Qty: 10 TABLET | Refills: 0 | Status: SHIPPED | OUTPATIENT
Start: 2024-05-02 | End: 2024-05-12

## 2024-05-02 RX ADMIN — FAMOTIDINE 20 MG: 10 INJECTION, SOLUTION INTRAVENOUS at 22:34

## 2024-05-02 RX ADMIN — LORAZEPAM 1 MG: 2 INJECTION INTRAMUSCULAR; INTRAVENOUS at 19:50

## 2024-05-02 RX ADMIN — ALPRAZOLAM 1 MG: 0.5 TABLET ORAL at 22:34

## 2024-05-02 RX ADMIN — ONDANSETRON 4 MG: 2 INJECTION INTRAMUSCULAR; INTRAVENOUS at 19:50

## 2024-05-02 RX ADMIN — METOROPROLOL TARTRATE 2.5 MG: 5 INJECTION, SOLUTION INTRAVENOUS at 19:42
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
## 2024-05-07 DIAGNOSIS — E78.2 MIXED HYPERLIPIDEMIA: ICD-10-CM

## 2024-05-07 DIAGNOSIS — I10 ESSENTIAL HYPERTENSION: ICD-10-CM

## 2024-05-07 DIAGNOSIS — I48.0 PAROXYSMAL ATRIAL FIBRILLATION (HCC): ICD-10-CM

## 2024-05-07 RX ORDER — METOPROLOL SUCCINATE 50 MG/1
50 TABLET, EXTENDED RELEASE ORAL DAILY
Qty: 90 TABLET | Refills: 1 | Status: SHIPPED | OUTPATIENT
Start: 2024-05-07 | End: 2024-11-03
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
## 2024-05-30 ENCOUNTER — TELEPHONE (OUTPATIENT)
Dept: CARDIOLOGY CLINIC | Facility: CLINIC | Age: 84
End: 2024-05-30

## 2024-05-30 DIAGNOSIS — K21.9 GASTROESOPHAGEAL REFLUX DISEASE WITHOUT ESOPHAGITIS: ICD-10-CM

## 2024-05-30 DIAGNOSIS — E78.2 MIXED HYPERLIPIDEMIA: ICD-10-CM

## 2024-05-30 RX ORDER — ROSUVASTATIN CALCIUM 10 MG/1
10 TABLET, COATED ORAL DAILY
Qty: 90 TABLET | Refills: 3 | Status: SHIPPED | OUTPATIENT
Start: 2024-05-30

## 2024-05-30 RX ORDER — FAMOTIDINE 20 MG/1
20 TABLET, FILM COATED ORAL DAILY
Qty: 90 TABLET | Refills: 3 | Status: SHIPPED | OUTPATIENT
Start: 2024-05-30
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
## 2024-05-30 NOTE — TELEPHONE ENCOUNTER
Done. Please tell her I am so so sorry to hear this. I hope she is ok and certainly if she needs any of her other medications please let us know.

## 2024-07-31 DIAGNOSIS — K21.9 GASTROESOPHAGEAL REFLUX DISEASE WITHOUT ESOPHAGITIS: ICD-10-CM
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
## 2024-08-01 RX ORDER — FAMOTIDINE 20 MG/1
20 TABLET, FILM COATED ORAL 2 TIMES DAILY
Qty: 180 TABLET | Refills: 1 | Status: SHIPPED | OUTPATIENT
Start: 2024-08-01

## 2024-08-22 ENCOUNTER — OFFICE VISIT (OUTPATIENT)
Dept: CARDIOLOGY CLINIC | Facility: CLINIC | Age: 84
End: 2024-08-22
Payer: MEDICARE

## 2024-08-22 VITALS
SYSTOLIC BLOOD PRESSURE: 162 MMHG | OXYGEN SATURATION: 98 % | HEART RATE: 68 BPM | DIASTOLIC BLOOD PRESSURE: 70 MMHG | HEIGHT: 63 IN | BODY MASS INDEX: 22.89 KG/M2 | WEIGHT: 129.2 LBS

## 2024-08-22 DIAGNOSIS — I10 ESSENTIAL HYPERTENSION: ICD-10-CM

## 2024-08-22 DIAGNOSIS — I48.0 PAROXYSMAL ATRIAL FIBRILLATION (HCC): Primary | ICD-10-CM

## 2024-08-22 DIAGNOSIS — I47.10 SVT (SUPRAVENTRICULAR TACHYCARDIA): ICD-10-CM

## 2024-08-22 DIAGNOSIS — E78.2 MIXED HYPERLIPIDEMIA: ICD-10-CM

## 2024-08-22 PROCEDURE — 99214 OFFICE O/P EST MOD 30 MIN: CPT | Performed by: INTERNAL MEDICINE

## 2024-08-22 PROCEDURE — G2211 COMPLEX E/M VISIT ADD ON: HCPCS | Performed by: INTERNAL MEDICINE
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
## 2024-11-09 ENCOUNTER — OFFICE VISIT (OUTPATIENT)
Dept: URGENT CARE | Facility: CLINIC | Age: 84
End: 2024-11-09
Payer: MEDICARE

## 2024-11-09 VITALS
SYSTOLIC BLOOD PRESSURE: 136 MMHG | HEIGHT: 63 IN | OXYGEN SATURATION: 98 % | BODY MASS INDEX: 22.5 KG/M2 | RESPIRATION RATE: 18 BRPM | DIASTOLIC BLOOD PRESSURE: 70 MMHG | WEIGHT: 127 LBS | HEART RATE: 68 BPM | TEMPERATURE: 95.7 F

## 2024-11-09 DIAGNOSIS — J02.9 SORE THROAT: Primary | ICD-10-CM

## 2024-11-09 DIAGNOSIS — U07.1 COVID: ICD-10-CM

## 2024-11-09 LAB
SARS-COV-2 AG UPPER RESP QL IA: POSITIVE
VALID CONTROL: ABNORMAL

## 2024-11-09 PROCEDURE — 87811 SARS-COV-2 COVID19 W/OPTIC: CPT | Performed by: NURSE PRACTITIONER

## 2024-11-09 PROCEDURE — G0463 HOSPITAL OUTPT CLINIC VISIT: HCPCS | Performed by: NURSE PRACTITIONER

## 2024-11-09 PROCEDURE — 99213 OFFICE O/P EST LOW 20 MIN: CPT | Performed by: NURSE PRACTITIONER

## 2024-11-09 RX ORDER — AZITHROMYCIN 250 MG/1
250 TABLET, FILM COATED ORAL EVERY 24 HOURS
COMMUNITY
Start: 2024-11-07

## 2024-11-09 NOTE — PROGRESS NOTES
"  [facility]        NAME: Oxana Nelosn is a 84 y.o. female  : 1940    MRN: [id number]  DATE: 2024  TIME: 1:24 PM    Assessment and Plan   Sore throat [J02.9]  1. Sore throat  Poct Covid 19 Rapid Antigen Test      2. COVID              Patient Instructions       Follow up with PCP in 3-5 days.  Proceed to  ER if symptoms worsen.    If tests have been performed at [facility], our office will contact you with results if changes need to be made to the care plan discussed with you at the visit.  You can review your full results on [facility].    Chief Complaint     Chief Complaint   Patient presents with    Possible viral infection.     C/o sore throat associated with headache, chills, \"feverish\" body aches, nasal drainage.. Onset 2 days ago. Called PCP at onset with sore throat pain and started on Z-Pack. Other symptoms started and feeling no better.         History of Present Illness       Patient with 2-day history of chills, fever, body aches, runny nose, intermittent sore throat.  She saw her primary care 2 days ago and started Z-Zackary that night.  States that she came here today because she is continuing to have fevers intermittently and is taking Tylenol every 4 hours to help with headache.  Denies any nausea vomiting diarrhea and is eating and drinking normally.  Reviewed that she is positive for COVID today.  Reviewed viral illness and importance of follow-up with emergency room for any shortness of breath, inability to eat or drink, fever not controlled with Tylenol or Motrin.        Review of Systems   Review of Systems   Constitutional:  Positive for chills, fatigue and fever.   HENT:  Positive for postnasal drip, rhinorrhea and sore throat. Negative for congestion, facial swelling, sinus pressure, sneezing and trouble swallowing.    Eyes: Negative.  Negative for discharge and redness.   Respiratory:  Positive for cough and choking. Negative for shortness of breath and " wheezing.    Cardiovascular: Negative.  Negative for chest pain.   Gastrointestinal: Negative.  Negative for abdominal pain, constipation, diarrhea and vomiting.   Endocrine: Negative.    Genitourinary: Negative.  Negative for dysuria.   Musculoskeletal: Negative.  Negative for gait problem.   Skin: Negative.  Negative for color change.   Neurological:  Positive for headaches. Negative for weakness.   Psychiatric/Behavioral: Negative.  Negative for behavioral problems.          Current Medications       Current Outpatient Medications:     ALPRAZolam (XANAX) 1 mg tablet, Take 1 tablet (1 mg total) by mouth daily as needed for anxiety for up to 10 days (Patient taking differently: Take 1 mg by mouth daily at bedtime 0.5mg in afternoon), Disp: 10 tablet, Rfl: 0    azithromycin (ZITHROMAX) 250 mg tablet, Take 250 mg by mouth every 24 hours, Disp: , Rfl:     Calcium Carbonate-Vit D-Min (CALTRATE 600+D PLUS PO), Caltrate 600 plus D  1 PO QD, Disp: , Rfl:     Cholecalciferol (VITAMIN D3 PO), Take by mouth, Disp: , Rfl:     famotidine (PEPCID) 20 mg tablet, Take 1 tablet (20 mg total) by mouth 2 (two) times a day, Disp: 180 tablet, Rfl: 1    losartan (COZAAR) 50 mg tablet, Take 50 mg by mouth daily, Disp: , Rfl:     Magnesium 250 MG TABS, , Disp: , Rfl:     meclizine (ANTIVERT) 25 mg tablet, meclizine 25 mg tablet  take 1 tablet by mouth three times a day if needed for dizziness, Disp: , Rfl:     metoprolol succinate (TOPROL-XL) 50 mg 24 hr tablet, Take 1 tablet (50 mg total) by mouth daily, Disp: 90 tablet, Rfl: 1    Multiple Vitamins-Minerals (CENTRUM SILVER PO), Centrum Silver  1 PO QD, Disp: , Rfl:     Multiple Vitamins-Minerals (PRESERVISION AREDS 2 PO), Take by mouth, Disp: , Rfl:     rosuvastatin (CRESTOR) 10 MG tablet, Take 1 tablet (10 mg total) by mouth daily, Disp: 90 tablet, Rfl: 3    warfarin (COUMADIN) 2 mg tablet, Take 1 tablet (2 mg total) by mouth daily, Disp: 30 tablet, Rfl: 0    Current Allergies  "    Allergies as of 11/09/2024 - Reviewed 11/09/2024   Allergen Reaction Noted    Metronidazole Anaphylaxis 04/24/2013    Adhesive  [medical tape]  09/29/2020    Advil  [ibuprofen]  09/29/2020    Antihistamines, loratadine-type  09/29/2020    Demerol [meperidine]  09/29/2020    Ketorolac tromethamine  04/24/2013    Lisinopril Cough 09/29/2020    Pantoprazole  04/24/2013    Tolmetin  04/24/2013            The following portions of the patient's history were reviewed and updated as appropriate: allergies, current medications, past family history, past medical history, past social history, past surgical history and problem list.     Past Medical History:   Diagnosis Date    Arachnoid cyst     of the sella    Arthritis     Atrial fibrillation (HCC)     Cataract     Depression     Diverticular disease     GERD (gastroesophageal reflux disease)     Glaucoma     Hyperlipidemia     Hypertension     Macular degeneration     Polyarthropathy        Past Surgical History:   Procedure Laterality Date    APPENDECTOMY      CARDIAC CATHETERIZATION  1997    CATARACT EXTRACTION, BILATERAL  2022    CHOLECYSTECTOMY      COLECTOMY      DISCECTOMY SPINE CERVICAL ANTERIOR      HYSTERECTOMY W/ SALPINGO-OOPHERECTOMY      LUMBAR LAMINECTOMY      OTHER SURGICAL HISTORY      spur removal    OTHER SURGICAL HISTORY  05/06/2021    Trans sphenoid decompression of arachnoid cyst of the sella with subcutaneous fat packing    SKIN LESION EXCISION  04/25/2023    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    TONSILLECTOMY         Family History   Problem Relation Age of Onset    Coronary artery disease Mother     Colon cancer Father     Coronary artery disease Son     No Known Problems Son          Medications have been verified.        Objective   /70   Pulse 68   Temp (!) 95.7 °F (35.4 °C)   Resp 18   Ht 5' 3\" (1.6 m)   Wt 57.6 kg (127 lb)   SpO2 98%   BMI 22.50 kg/m²   No LMP recorded. Patient is postmenopausal.       Physical Exam "     Physical Exam  Vitals and nursing note reviewed.   Constitutional:       General: She is not in acute distress.     Appearance: Normal appearance. She is well-developed and normal weight. She is ill-appearing. She is not toxic-appearing.   HENT:      Head: Normocephalic and atraumatic.      Right Ear: External ear normal.      Left Ear: External ear normal.      Nose: Congestion and rhinorrhea present.      Mouth/Throat:      Mouth: Mucous membranes are moist.      Pharynx: No posterior oropharyngeal erythema.   Eyes:      General:         Right eye: No discharge.         Left eye: No discharge.      Conjunctiva/sclera: Conjunctivae normal.      Pupils: Pupils are equal, round, and reactive to light.   Neck:      Thyroid: No thyromegaly.   Cardiovascular:      Rate and Rhythm: Normal rate and regular rhythm.      Pulses: Normal pulses.      Heart sounds: Normal heart sounds.   Pulmonary:      Effort: Pulmonary effort is normal.      Breath sounds: Normal breath sounds. No stridor. No wheezing, rhonchi or rales.   Abdominal:      General: Abdomen is flat. There is no distension.      Palpations: Abdomen is soft.   Musculoskeletal:         General: Normal range of motion.      Cervical back: Normal range of motion.   Skin:     General: Skin is warm.      Coloration: Skin is not pale.   Neurological:      General: No focal deficit present.      Mental Status: She is alert and oriented to person, place, and time.   Psychiatric:         Mood and Affect: Mood normal.         Behavior: Behavior normal.         Thought Content: Thought content normal.         Judgment: Judgment normal.

## 2024-12-03 DIAGNOSIS — I10 ESSENTIAL HYPERTENSION: ICD-10-CM

## 2024-12-03 DIAGNOSIS — I48.0 PAROXYSMAL ATRIAL FIBRILLATION (HCC): ICD-10-CM

## 2024-12-03 DIAGNOSIS — E78.2 MIXED HYPERLIPIDEMIA: ICD-10-CM

## 2024-12-03 RX ORDER — METOPROLOL SUCCINATE 50 MG/1
50 TABLET, EXTENDED RELEASE ORAL DAILY
Qty: 90 TABLET | Refills: 1 | Status: SHIPPED | OUTPATIENT
Start: 2024-12-03 | End: 2024-12-03 | Stop reason: SDUPTHER

## 2024-12-03 RX ORDER — METOPROLOL SUCCINATE 50 MG/1
50 TABLET, EXTENDED RELEASE ORAL DAILY
Qty: 90 TABLET | Refills: 1 | Status: SHIPPED | OUTPATIENT
Start: 2024-12-03 | End: 2025-06-01

## 2024-12-03 NOTE — TELEPHONE ENCOUNTER
Received call from Violeta from the pod, stated that [facility]. Connected to patient and patient was willing to go to local pharmacy to  medication. Call placed to [facility] in [place], spoke to pharmacist and she said that she does have medication in stock. Pharmacist asked for electronic script.

## 2024-12-03 NOTE — TELEPHONE ENCOUNTER
Reason for call:   [x] Refill   [] Prior Auth  [] Other:     Office:   [x] Specialty/Provider - card / Ahmed    Medication: metoprolol succinate XL    Dose/Frequency: 50mg qd    Quantity: 90    Pharmacy: [facility]/pharmacy #[id number] [address]     Does the patient have enough for 3 days?   [] Yes   [x] No - Send as HP to POD

## 2024-12-03 NOTE — TELEPHONE ENCOUNTER
Caller: Oxana     Doctor: Burton    Reason for call: Patient calling for status of medication. Advised her that medication was sent per CTS.     Call back#: [phone]

## 2025-02-24 DIAGNOSIS — I48.0 PAROXYSMAL ATRIAL FIBRILLATION (HCC): ICD-10-CM

## 2025-02-24 DIAGNOSIS — I10 ESSENTIAL HYPERTENSION: ICD-10-CM

## 2025-02-24 DIAGNOSIS — E78.2 MIXED HYPERLIPIDEMIA: ICD-10-CM

## 2025-02-24 NOTE — TELEPHONE ENCOUNTER
Pharmacy change for  this refill   Reason for call:   [x] Refill   [] Prior Auth  [] Other:     Office:   [] PCP/Provider -   [x] Specialty/Provider - CARD Gaye Manrique    Medication: Metoprolol succinate XL    Dose/Frequency: 50 mg Daily     Quantity: 90    Pharmacy: [facility]     Does the patient have enough for 3 days?   [x] Yes   [] No - Send as HP to POD

## 2025-02-26 RX ORDER — METOPROLOL SUCCINATE 50 MG/1
50 TABLET, EXTENDED RELEASE ORAL DAILY
Qty: 90 TABLET | Refills: 0 | Status: SHIPPED | OUTPATIENT
Start: 2025-02-26 | End: 2025-08-25

## 2025-03-03 ENCOUNTER — APPOINTMENT (OUTPATIENT)
Dept: LAB | Facility: HOSPITAL | Age: 85
End: 2025-03-03
Payer: MEDICARE

## 2025-03-03 ENCOUNTER — OFFICE VISIT (OUTPATIENT)
Dept: CARDIOLOGY CLINIC | Facility: CLINIC | Age: 85
End: 2025-03-03
Payer: MEDICARE

## 2025-03-03 VITALS
DIASTOLIC BLOOD PRESSURE: 80 MMHG | BODY MASS INDEX: 23.57 KG/M2 | WEIGHT: 133 LBS | TEMPERATURE: 98.2 F | HEIGHT: 63 IN | SYSTOLIC BLOOD PRESSURE: 168 MMHG | OXYGEN SATURATION: 98 % | HEART RATE: 78 BPM

## 2025-03-03 DIAGNOSIS — E78.2 MIXED HYPERLIPIDEMIA: ICD-10-CM

## 2025-03-03 DIAGNOSIS — I48.0 PAROXYSMAL ATRIAL FIBRILLATION (HCC): Primary | ICD-10-CM

## 2025-03-03 DIAGNOSIS — I47.10 SVT (SUPRAVENTRICULAR TACHYCARDIA) (HCC): ICD-10-CM

## 2025-03-03 DIAGNOSIS — I48.0 PAROXYSMAL ATRIAL FIBRILLATION (HCC): ICD-10-CM

## 2025-03-03 DIAGNOSIS — I10 ESSENTIAL HYPERTENSION: ICD-10-CM

## 2025-03-03 LAB
ANION GAP SERPL CALCULATED.3IONS-SCNC: 6 MMOL/L (ref 4–13)
BUN SERPL-MCNC: 28 MG/DL (ref 5–25)
CALCIUM SERPL-MCNC: 9.3 MG/DL (ref 8.4–10.2)
CHLORIDE SERPL-SCNC: 102 MMOL/L (ref 96–108)
CO2 SERPL-SCNC: 30 MMOL/L (ref 21–32)
CREAT SERPL-MCNC: 1.31 MG/DL (ref 0.6–1.3)
GFR SERPL CREATININE-BSD FRML MDRD: 37 ML/MIN/1.73SQ M
GLUCOSE SERPL-MCNC: 110 MG/DL (ref 65–140)
POTASSIUM SERPL-SCNC: 4.3 MMOL/L (ref 3.5–5.3)
SODIUM SERPL-SCNC: 138 MMOL/L (ref 135–147)

## 2025-03-03 PROCEDURE — 99214 OFFICE O/P EST MOD 30 MIN: CPT | Performed by: INTERNAL MEDICINE

## 2025-03-03 PROCEDURE — 80048 BASIC METABOLIC PNL TOTAL CA: CPT

## 2025-03-03 PROCEDURE — 36415 COLL VENOUS BLD VENIPUNCTURE: CPT

## 2025-03-03 PROCEDURE — G2211 COMPLEX E/M VISIT ADD ON: HCPCS | Performed by: INTERNAL MEDICINE

## 2025-03-03 NOTE — PROGRESS NOTES
[facility] [place]  [address]  [address]  [facility] [id number] [phone]    Patient Name: Oxana Nelson  YOB: 1940 ;female  MR No: [id number]        Diagnosis ICD-10-CM Associated Orders   1. Paroxysmal atrial fibrillation (HCC)  I48.0       2. SVT (supraventricular tachycardia) (HCC)  I47.10       3. Mixed hyperlipidemia  E78.2       4. Essential hypertension  I10            Assessment and recommendations:    1. Paroxysmal atrial fibrillation (HCC)  2. SVT (supraventricular tachycardia) (HCC)  3. Mixed hyperlipidemia  4. Essential hypertension       Patient remains in normal sinus rhythm with no bleeding problems with Coumadin.  I have advised her to keep a regular home blood pressure diary and if her blood pressure is more than 150/90, to take an additional 25 mg of losartan.  She will inform the office with her blood pressure numbers in 2 weeks and we will decide whether we need to increase the dose of the losartan.  I am a little reluctant to do that as she has had several blood pressure numbers as low as 105 mmHg after taking the morning dose of losartan.  We may consider splitting the losartan dose into depending upon her blood pressure log over the next 2 weeks.  Lipids are quite acceptable for her age, on current dose statins.  She was found to have brief SVT on her Holter monitor in 2023 and is on appropriate doses of beta-blockers.  Nuclear stress test and echocardiogram in October 2020 were quite unremarkable.  Check most recent BMP.      CHIEF COMPLAINT:      Hypertension, paroxysmal atrial fibrillation    HPI:   84-year-old female with past medical history significant for paroxysmal atrial fibrillation, SVT, essential hypertension, mixed hyperlipidemia, presents for routine follow-up visit.  She reports that her blood pressure has recently been showing some spikes later in the day.  I have reviewed her home blood pressure log  and her blood pressure in the morning is usually less than 130/80, however by the evening, she had several numbers in the 150/90-95 range.  She is doing very well and remains very active and denies any chest pain, dyspnea on exertion, palpitations or syncope.      Past Medical History:   Diagnosis Date    Arachnoid cyst     of the sella    Arthritis     Atrial fibrillation (HCC)     Cataract     Depression     Diverticular disease     GERD (gastroesophageal reflux disease)     Glaucoma     Hyperlipidemia     Hypertension     Macular degeneration     Polyarthropathy           CURRENT  MEDICATIONS:      Current Outpatient Medications:     ALPRAZolam (XANAX) 1 mg tablet, Take 1 tablet (1 mg total) by mouth daily as needed for anxiety for up to 10 days, Disp: 10 tablet, Rfl: 0    Calcium Carbonate-Vit D-Min (CALTRATE 600+D PLUS PO), Caltrate 600 plus D  1 PO QD, Disp: , Rfl:     Cholecalciferol (VITAMIN D3 PO), Take by mouth, Disp: , Rfl:     famotidine (PEPCID) 20 mg tablet, Take 1 tablet (20 mg total) by mouth 2 (two) times a day, Disp: 180 tablet, Rfl: 1    losartan (COZAAR) 50 mg tablet, Take 50 mg by mouth daily, Disp: , Rfl:     Magnesium 250 MG TABS, , Disp: , Rfl:     meclizine (ANTIVERT) 25 mg tablet, meclizine 25 mg tablet  take 1 tablet by mouth three times a day if needed for dizziness, Disp: , Rfl:     metoprolol succinate (TOPROL-XL) 50 mg 24 hr tablet, Take 1 tablet (50 mg total) by mouth daily, Disp: 90 tablet, Rfl: 0    Multiple Vitamins-Minerals (CENTRUM SILVER PO), Centrum Silver  1 PO QD, Disp: , Rfl:     Multiple Vitamins-Minerals (PRESERVISION AREDS 2 PO), Take by mouth, Disp: , Rfl:     rosuvastatin (CRESTOR) 10 MG tablet, Take 1 tablet (10 mg total) by mouth daily, Disp: 90 tablet, Rfl: 3    warfarin (COUMADIN) 2 mg tablet, Take 1 tablet (2 mg total) by mouth daily, Disp: 30 tablet, Rfl: 0    ALLERGIES  Allergies   Allergen Reactions    Metronidazole Anaphylaxis     Can Take Oral Only Had A  Reaction To IV Flagyl      Adhesive  [Medical Tape]     Advil  [Ibuprofen]     Antihistamines, Loratadine-Type     Demerol [Meperidine]     Ketorolac Tromethamine     Lisinopril Cough    Pantoprazole      severe reaction      Tolmetin        Lab Results   Component Value Date    LDLCALC 113 (H) 02/22/2024    LDLCALC 102 (H) 07/15/2023    HDL 68 02/22/2024    HDL 66 07/15/2023    CHOLESTEROL 209 (H) 02/22/2024    CHOLESTEROL 197 07/15/2023    TRIG 138 02/22/2024    TRIG 146 07/15/2023    CREATININE 1.32 (H) 05/02/2024    CREATININE 1.20 02/22/2024    K 4.0 05/02/2024    K 4.3 02/22/2024    SODIUM 139 05/02/2024    SODIUM 139 02/22/2024    TSH 2.89 07/09/2021       REVIEW OF SYSTEMS   Positive for: Arthritis, hand digit swelling  Negative for: All remaining as reviewed below and in HPI.    SYSTEM SYMPTOMS REVIEWED:  General--weight change, fever, night sweats  Respiratory--cough, wheezing, shortness of breath, sputum production  Cardiovascular--chest pain, syncope, dyspnea on exertion, edema, decline in exercise tolerance, claudication   Gastrointestinal--persistent vomiting, diarrhea, abdominal distention, blood in stool   Muscular or skeletal--joint pain or swelling   Neurologic--headaches, syncope, abnormal movement  Hematologic--history of easy bruising and bleeding   Endocrine--thyroid enlargement, heat or cold intolerance, polyuria   Psychiatric--anxiety, depression     General physical examination:    General appearance: Alert, no acute distress, appears younger than stated age, normal weight.  HEENT: Mucous membranes are moist.  No obvious abnormality noted.  Neck: Supple with no lymphadenopathy.  No JVD.  Carotid pulses are intact.  No carotid bruit.  Cardiovascular system: Regular rhythm.  Normal S1 and S2.  No murmurs.  No rubs or gallops. Extremities: No edema. No cyanosis.  Pulmonary: Respirations unlabored.  Good air entry bilaterally.  Clear to auscultation bilaterally.  Gastrointestinal: Abdomen is  "soft and nontender.  Bowel sounds are positive.  Musculoskeletal: Upper Extremities: Normal upper motor strength.  Osteoarthritic changes noted in both hands.  Lower Extremity: Normal motor strength. Gait: Normal.   Skin: Skin is warm. No rashes or lesions.  Neurological: Patient is alert and oriented with no gross motor deficits.  Psychiatric: Mood is normal.  Behavior is normal.    Vitals:    03/03/25 1354   BP: 168/80   Pulse: 78   Temp: 98.2 °F (36.8 °C)   SpO2: 98%      Body mass index is 23.56 kg/m².  Wt Readings from Last 3 Encounters:   03/03/25 60.3 kg (133 lb)   11/09/24 57.6 kg (127 lb)   08/22/24 58.6 kg (129 lb 3.2 oz)             Jorge Garvey MD, FACC, SHIVANI    Portions of the record  have been created with voice recognition software.  Occasional grammatical mistakes or wrong word or \"sound alike\" substitutions may have occurred due to the inherent limitations of voice recognition software. Please reach out to me directly for any clarifications.   "

## 2025-04-24 DIAGNOSIS — I48.0 PAROXYSMAL ATRIAL FIBRILLATION (HCC): ICD-10-CM

## 2025-04-24 DIAGNOSIS — K21.9 GASTROESOPHAGEAL REFLUX DISEASE WITHOUT ESOPHAGITIS: ICD-10-CM

## 2025-04-24 DIAGNOSIS — I10 ESSENTIAL HYPERTENSION: ICD-10-CM

## 2025-04-24 DIAGNOSIS — E78.2 MIXED HYPERLIPIDEMIA: ICD-10-CM

## 2025-04-24 RX ORDER — FAMOTIDINE 20 MG/1
20 TABLET, FILM COATED ORAL 2 TIMES DAILY
Qty: 180 TABLET | Refills: 3 | Status: SHIPPED | OUTPATIENT
Start: 2025-04-24

## 2025-04-24 RX ORDER — METOPROLOL SUCCINATE 50 MG/1
50 TABLET, EXTENDED RELEASE ORAL DAILY
Qty: 90 TABLET | Refills: 3 | Status: SHIPPED | OUTPATIENT
Start: 2025-04-24

## 2025-04-24 NOTE — TELEPHONE ENCOUNTER
Reason for call:   [x] Refill   [] Prior Auth  [] Other:     Office:   [] PCP/Provider -   [x] Specialty/Provider - Cardio    Medication: metoprolol succinate (TOPROL-XL) 50 mg 24 hr tablet     Dose/Frequency:  Take 1 tablet (50 mg total) by mouth daily,     Quantity: 90    Pharmacy: [facility]/pharmacy #[address] [place] [address] Pharmacy   Does the patient have enough for 3 days?   [] Yes   [x] No - Send as HP to POD    Mail Away Pharmacy   Does the patient have enough for 10 days?   [] Yes   [] No - Send as HP to POD    Reason for call:   [x] Refill   [] Prior Auth  [] Other:     Office:   [] PCP/Provider -   [x] Specialty/Provider - Cardio    Medication: famotidine (PEPCID) 20 mg tablet     Dose/Frequency: Take 1 tablet (20 mg total) by mouth 2 (two) times a day,     Quantity: 180    Pharmacy: [facility]/pharmacy [address] [place] [address] Pharmacy   Does the patient have enough for 3 days?   [] Yes   [x] No - Send as HP to POD    Mail Away Pharmacy   Does the patient have enough for 10 days?   [] Yes   [] No - Send as HP to POD

## 2025-04-25 RX ORDER — FAMOTIDINE 20 MG/1
20 TABLET, FILM COATED ORAL 2 TIMES DAILY
Qty: 180 TABLET | Refills: 1 | OUTPATIENT
Start: 2025-04-25

## 2025-04-25 RX ORDER — METOPROLOL SUCCINATE 50 MG/1
50 TABLET, EXTENDED RELEASE ORAL DAILY
Qty: 90 TABLET | Refills: 0 | OUTPATIENT
Start: 2025-04-25

## 2025-06-26 ENCOUNTER — APPOINTMENT (EMERGENCY)
Dept: CT IMAGING | Facility: HOSPITAL | Age: 85
End: 2025-06-26
Payer: MEDICARE

## 2025-06-26 ENCOUNTER — HOSPITAL ENCOUNTER (EMERGENCY)
Facility: HOSPITAL | Age: 85
Discharge: HOME | End: 2025-06-26
Attending: EMERGENCY MEDICINE
Payer: MEDICARE

## 2025-06-26 ENCOUNTER — APPOINTMENT (EMERGENCY)
Dept: NON INVASIVE DIAGNOSTICS | Facility: HOSPITAL | Age: 85
End: 2025-06-26
Payer: MEDICARE

## 2025-06-26 VITALS
DIASTOLIC BLOOD PRESSURE: 74 MMHG | BODY MASS INDEX: 24.53 KG/M2 | SYSTOLIC BLOOD PRESSURE: 172 MMHG | WEIGHT: 138.45 LBS | TEMPERATURE: 98.9 F | RESPIRATION RATE: 15 BRPM | HEART RATE: 84 BPM | OXYGEN SATURATION: 97 %

## 2025-06-26 DIAGNOSIS — M79.604 RIGHT LEG PAIN: Primary | ICD-10-CM

## 2025-06-26 LAB
ANION GAP SERPL CALCULATED.3IONS-SCNC: 9 MMOL/L (ref 4–13)
APTT PPP: 41 SECONDS (ref 23–34)
BASOPHILS # BLD AUTO: 0.05 THOUSANDS/ÂΜL (ref 0–0.1)
BASOPHILS NFR BLD AUTO: 1 % (ref 0–1)
BUN SERPL-MCNC: 24 MG/DL (ref 5–25)
CALCIUM SERPL-MCNC: 9.3 MG/DL (ref 8.4–10.2)
CHLORIDE SERPL-SCNC: 103 MMOL/L (ref 96–108)
CO2 SERPL-SCNC: 25 MMOL/L (ref 21–32)
CREAT SERPL-MCNC: 1.17 MG/DL (ref 0.6–1.3)
EOSINOPHIL # BLD AUTO: 0.4 THOUSAND/ÂΜL (ref 0–0.61)
EOSINOPHIL NFR BLD AUTO: 5 % (ref 0–6)
ERYTHROCYTE [DISTWIDTH] IN BLOOD BY AUTOMATED COUNT: 13.1 % (ref 11.6–15.1)
GFR SERPL CREATININE-BSD FRML MDRD: 42 ML/MIN/1.73SQ M
GLUCOSE SERPL-MCNC: 218 MG/DL (ref 65–140)
HCT VFR BLD AUTO: 38.4 % (ref 34.8–46.1)
HGB BLD-MCNC: 12.7 G/DL (ref 11.5–15.4)
IMM GRANULOCYTES # BLD AUTO: 0.05 THOUSAND/UL (ref 0–0.2)
IMM GRANULOCYTES NFR BLD AUTO: 1 % (ref 0–2)
INR PPP: 2.73 (ref 0.85–1.19)
LYMPHOCYTES # BLD AUTO: 2.6 THOUSANDS/ÂΜL (ref 0.6–4.47)
LYMPHOCYTES NFR BLD AUTO: 31 % (ref 14–44)
MCH RBC QN AUTO: 32.4 PG (ref 26.8–34.3)
MCHC RBC AUTO-ENTMCNC: 33.1 G/DL (ref 31.4–37.4)
MCV RBC AUTO: 98 FL (ref 82–98)
MONOCYTES # BLD AUTO: 0.9 THOUSAND/ÂΜL (ref 0.17–1.22)
MONOCYTES NFR BLD AUTO: 11 % (ref 4–12)
NEUTROPHILS # BLD AUTO: 4.44 THOUSANDS/ÂΜL (ref 1.85–7.62)
NEUTS SEG NFR BLD AUTO: 51 % (ref 43–75)
NRBC BLD AUTO-RTO: 0 /100 WBCS
PLATELET # BLD AUTO: 259 THOUSANDS/UL (ref 149–390)
PMV BLD AUTO: 10.5 FL (ref 8.9–12.7)
POTASSIUM SERPL-SCNC: 3.9 MMOL/L (ref 3.5–5.3)
PROTHROMBIN TIME: 28.9 SECONDS (ref 12.3–15)
RBC # BLD AUTO: 3.92 MILLION/UL (ref 3.81–5.12)
SODIUM SERPL-SCNC: 137 MMOL/L (ref 135–147)
WBC # BLD AUTO: 8.44 THOUSAND/UL (ref 4.31–10.16)

## 2025-06-26 PROCEDURE — 99285 EMERGENCY DEPT VISIT HI MDM: CPT | Performed by: EMERGENCY MEDICINE

## 2025-06-26 PROCEDURE — 80048 BASIC METABOLIC PNL TOTAL CA: CPT | Performed by: EMERGENCY MEDICINE

## 2025-06-26 PROCEDURE — 36415 COLL VENOUS BLD VENIPUNCTURE: CPT | Performed by: EMERGENCY MEDICINE

## 2025-06-26 PROCEDURE — 85610 PROTHROMBIN TIME: CPT | Performed by: EMERGENCY MEDICINE

## 2025-06-26 PROCEDURE — 93971 EXTREMITY STUDY: CPT

## 2025-06-26 PROCEDURE — 93971 EXTREMITY STUDY: CPT | Performed by: SURGERY

## 2025-06-26 PROCEDURE — 85025 COMPLETE CBC W/AUTO DIFF WBC: CPT | Performed by: EMERGENCY MEDICINE

## 2025-06-26 PROCEDURE — 71275 CT ANGIOGRAPHY CHEST: CPT

## 2025-06-26 PROCEDURE — 74174 CTA ABD&PLVS W/CONTRAST: CPT

## 2025-06-26 PROCEDURE — 99283 EMERGENCY DEPT VISIT LOW MDM: CPT

## 2025-06-26 PROCEDURE — 85730 THROMBOPLASTIN TIME PARTIAL: CPT | Performed by: EMERGENCY MEDICINE

## 2025-06-26 RX ORDER — ACETAMINOPHEN 325 MG/1
975 TABLET ORAL ONCE
Status: COMPLETED | OUTPATIENT
Start: 2025-06-26 | End: 2025-06-26

## 2025-06-26 RX ADMIN — IOHEXOL 100 ML: 350 INJECTION, SOLUTION INTRAVENOUS at 13:48

## 2025-06-26 RX ADMIN — ACETAMINOPHEN 975 MG: 325 TABLET ORAL at 13:10

## 2025-06-26 NOTE — ED PROVIDER NOTES
Time reflects when diagnosis was documented in both MDM as applicable and the Disposition within this note       Time User Action Codes Description Comment    6/26/2025  3:11 PM Jorden Morris Add [M79.604] Right leg pain           ED Disposition       ED Disposition   Discharge    Condition   Stable    Date/Time   Thu Jun 26, 2025  3:11 PM    Comment   [facility] Spototilia discharge to home/self care.                   Assessment & Plan       Medical Decision Making  This patient presents with low back, right thigh pain.   Diagnostic considerations include aortic dissection, AAA, HNP, sciatica. See ED Course.       Amount and/or Complexity of Data Reviewed  Labs: ordered. Decision-making details documented in ED Course.  Radiology: ordered and independent interpretation performed. Decision-making details documented in ED Course.  ECG/medicine tests: ordered and independent interpretation performed. Decision-making details documented in ED Course.    Risk  OTC drugs.  Prescription drug management.        ED Course as of 06/26/25 1514   Thu Jun 26, 2025   1345 WBC: 8.44   1345 GFR, Calculated: 42       Medications   acetaminophen (TYLENOL) tablet 975 mg (975 mg Oral Given 6/26/25 1310)   iohexol (OMNIPAQUE) 350 MG/ML injection (MULTI-DOSE) 100 mL (100 mL Intravenous Given 6/26/25 1348)       ED Risk Strat Scores                    (ISAR) Identification of Seniors at Risk  Before the illness or injury that brought you to the Emergency, did you need someone to help you on a regular basis?: 0  In the last 24 hours, have you needed more help than usual?: 0  Have you been hospitalized for one or more nights during the past 6 months?: 0  In general, do you see well?: 1  In general, do you have serious problems with your memory?: 0  Do you take more than three different medications every day?: 0  ISAR Score: 1            SBIRT 22yo+      Flowsheet Row Most Recent Value   Initial Alcohol Screen: US AUDIT-C     1. How often do you  have a drink containing alcohol? 0 Filed at: 06/26/2025 1243   2. How many drinks containing alcohol do you have on a typical day you are drinking?  0 Filed at: 06/26/2025 1243   3b. FEMALE Any Age, or MALE 65+: How often do you have 4 or more drinks on one occassion? 0 Filed at: 06/26/2025 1243   Audit-C Score 0 Filed at: 06/26/2025 1243   MICHAEL: How many times in the past year have you...    Used an illegal drug or used a prescription medication for non-medical reasons? Never Filed at: 06/26/2025 1243                            History of Present Illness       Chief Complaint   Patient presents with    Leg Pain     3 days of right hip and leg pain, takes coumadin for A-fib and thinks she may have a blood clot. Went to urgent care and told to come to the ED.       Past Medical History[1]   Past Surgical History[2]   Family History[3]   Social History[4]   E-Cigarette/Vaping    E-Cigarette Use Never User       E-Cigarette/Vaping Substances    Nicotine No     THC No     CBD No     Flavoring No     Other No     Unknown No       I have reviewed and agree with the history as documented.     84-year-old female describes worsening right-sided lateral thigh pain over the past few days, worse with prone and improved if ambulatory.  No numbness or weakness.  No chest back or belly pain.  No dyspnea or hemoptysis.      History provided by:  Patient  Leg Pain  Pain details:     Quality:  Aching    Severity:  Severe    Timing:  Intermittent    Progression:  Waxing and waning  Chronicity:  New  Prior injury to area:  No  Relieved by:  Acetaminophen  Worsened by:  Nothing  Ineffective treatments:  None tried  Associated symptoms: no muscle weakness, no numbness and no swelling    Risk factors: no recent illness        Review of Systems   All other systems reviewed and are negative.          Objective       ED Triage Vitals [06/26/25 1244]   Temperature Pulse Blood Pressure Respirations SpO2 Patient Position - Orthostatic VS   98.9  °F (37.2 °C) 95 (!) 183/83 16 97 % Sitting      Temp Source Heart Rate Source BP Location FiO2 (%) Pain Score    Temporal Monitor Left arm -- 5      Vitals      Date and Time Temp Pulse SpO2 Resp BP Pain Score FACES Pain Rating User   06/26/25 1244 98.9 °F (37.2 °C) 95 97 % 16 183/83 5 -- BF            Physical Exam  Vitals and nursing note reviewed.   Constitutional:       General: She is not in acute distress.     Appearance: Normal appearance.      Comments: Pleasant, comfortable appearing, conversational, articulate   HENT:      Head: Normocephalic and atraumatic.      Right Ear: External ear normal.      Left Ear: External ear normal.      Nose: Nose normal.      Mouth/Throat:      Mouth: Mucous membranes are moist.     Eyes:      Extraocular Movements: Extraocular movements intact.      Pupils: Pupils are equal, round, and reactive to light.       Cardiovascular:      Rate and Rhythm: Normal rate and regular rhythm.      Heart sounds: No murmur heard.  Pulmonary:      Effort: Pulmonary effort is normal. No respiratory distress.      Breath sounds: Normal breath sounds. No wheezing or rales.   Abdominal:      General: Abdomen is flat. Bowel sounds are normal. There is no distension.      Tenderness: There is no abdominal tenderness. There is no guarding or rebound.     Musculoskeletal:      Cervical back: Neck supple.      Right lower leg: No edema.      Left lower leg: No edema.      Comments: Right mid lateral thigh indicated as area of discomfort is without swelling, mass changes in overlying skin changes, soft compartments, right femoral and popliteal pulses 2+, dorsal pedal pulses 2+ bilaterally, intact right hip knee ankle and foot strength and dexterity, sensation intact, no midline spinal tenderness at cervical, thoracic or lumbar areas, lower midline scar.  Deep and reflexes 2/4 at knees and ankles bilaterally     Skin:     General: Skin is warm and dry.     Neurological:      General: No focal deficit  present.      Mental Status: She is alert and oriented to person, place, and time.      Cranial Nerves: No cranial nerve deficit.      Sensory: No sensory deficit.      Motor: No weakness.      Coordination: Coordination normal.     Psychiatric:         Mood and Affect: Mood normal.         Behavior: Behavior normal.         Results Reviewed       Procedure Component Value Units Date/Time    APTT [[id number]]  (Abnormal) Collected: 06/26/25 1310    Lab Status: Final result Specimen: Blood from Arm, Right Updated: 06/26/25 1336     PTT 41 seconds     Protime-INR [[id number]]  (Abnormal) Collected: 06/26/25 1310    Lab Status: Final result Specimen: Blood from Arm, Right Updated: 06/26/25 1336     Protime 28.9 seconds      INR 2.73    Narrative:      INR Therapeutic Range    Indication                                             INR Range      Atrial Fibrillation                                               2.0-3.0  Hypercoagulable State                                    2.0.2.3  Left Ventricular Asist Device                            2.0-3.0  Mechanical Heart Valve                                  -    Aortic(with afib, MI, embolism, HF, LA enlargement,    and/or coagulopathy)                                     2.0-3.0 (2.5-3.5)     Mitral                                                             2.5-3.5  Prosthetic/Bioprosthetic Heart Valve               2.0-3.0  Venous thromboembolism (VTE: VT, PE        2.0-3.0    Basic metabolic panel [[id number]]  (Abnormal) Collected: 06/26/25 1310    Lab Status: Final result Specimen: Blood from Arm, Right Updated: 06/26/25 1336     Sodium 137 mmol/L      Potassium 3.9 mmol/L      Chloride 103 mmol/L      CO2 25 mmol/L      ANION GAP 9 mmol/L      BUN 24 mg/dL      Creatinine 1.17 mg/dL      Glucose 218 mg/dL      Calcium 9.3 mg/dL      eGFR 42 ml/min/1.73sq m     Narrative:      [employer] guidelines for Chronic Kidney Disease (CKD):     Stage 1  with normal or high GFR (GFR > 90 mL/min/1.73 square meters)    Stage 2 Mild CKD (GFR = 60-89 mL/min/1.73 square meters)    Stage 3A Moderate CKD (GFR = 45-59 mL/min/1.73 square meters)    Stage 3B Moderate CKD (GFR = 30-44 mL/min/1.73 square meters)    Stage 4 Severe CKD (GFR = 15-29 mL/min/1.73 square meters)    Stage 5 End Stage CKD (GFR <15 mL/min/1.73 square meters)  Note: GFR calculation is accurate only with a steady state creatinine    CBC and differential [[id number]] Collected: 06/26/25 1310    Lab Status: Final result Specimen: Blood from Arm, Right Updated: 06/26/25 1318     WBC 8.44 Thousand/uL      RBC 3.92 Million/uL      Hemoglobin 12.7 g/dL      Hematocrit 38.4 %      MCV 98 fL      MCH 32.4 pg      MCHC 33.1 g/dL      RDW 13.1 %      MPV 10.5 fL      Platelets 259 Thousands/uL      nRBC 0 /100 WBCs      Segmented % 51 %      Immature Grans % 1 %      Lymphocytes % 31 %      Monocytes % 11 %      Eosinophils Relative 5 %      Basophils Relative 1 %      Absolute Neutrophils 4.44 Thousands/µL      Absolute Immature Grans 0.05 Thousand/uL      Absolute Lymphocytes 2.60 Thousands/µL      Absolute Monocytes 0.90 Thousand/µL      Eosinophils Absolute 0.40 Thousand/µL      Basophils Absolute 0.05 Thousands/µL             CTA dissection protocol chest abdomen pelvis w wo contrast   Final Interpretation by Fabrizio Dominguez MD (06/26 5298)      No aortic dissection or intramural hematoma.   There is mild atherosclerotic disease without stenosis   No acute process seen.         Computerized Assisted Algorithm (CAA) may have aided analysis of applicable images.         Workstation performed: [id number]         VAS lower limb venous duplex study, unilateral/limited    (Results Pending)       Procedures    ED Medication and Procedure Management   Prior to Admission Medications   Prescriptions Last Dose Informant Patient Reported? Taking?   ALPRAZolam (XANAX) 1 mg tablet   No No   Sig: Take 1 tablet (1 mg  total) by mouth daily as needed for anxiety for up to 10 days   Calcium Carbonate-Vit D-Min (CALTRATE 600+D PLUS PO)   Yes No   Sig: Caltrate 600 plus D   1 PO QD   Cholecalciferol (VITAMIN D3 PO)   Yes No   Sig: Take by mouth   Magnesium 250 MG TABS   Yes No   Multiple Vitamins-Minerals (CENTRUM SILVER PO)   Yes No   Sig: Centrum Silver   1 PO QD   Multiple Vitamins-Minerals (PRESERVISION AREDS 2 PO)   Yes No   Sig: Take by mouth   famotidine (PEPCID) 20 mg tablet   No No   Sig: Take 1 tablet (20 mg total) by mouth 2 (two) times a day   losartan (COZAAR) 50 mg tablet   Yes No   Sig: Take 50 mg by mouth daily   meclizine (ANTIVERT) 25 mg tablet   Yes No   Sig: meclizine 25 mg tablet   take 1 tablet by mouth three times a day if needed for dizziness   metoprolol succinate (TOPROL-XL) 50 mg 24 hr tablet   No No   Sig: Take 1 tablet (50 mg total) by mouth daily   rosuvastatin (CRESTOR) 10 MG tablet   No No   Sig: Take 1 tablet (10 mg total) by mouth daily   warfarin (COUMADIN) 2 mg tablet   No No   Sig: Take 1 tablet (2 mg total) by mouth daily      Facility-Administered Medications: None     Patient's Medications   Discharge Prescriptions    No medications on file       ED SEPSIS DOCUMENTATION   Time reflects when diagnosis was documented in both MDM as applicable and the Disposition within this note       Time User Action Codes Description Comment    6/26/2025  3:11 PM Jorden Morris Add [M79.604] Right leg pain                    [1]   Past Medical History:  Diagnosis Date    Arachnoid cyst     of the sella    Arthritis     Atrial fibrillation (HCC)     Cataract     Depression     Diverticular disease     GERD (gastroesophageal reflux disease)     Glaucoma     Hyperlipidemia     Hypertension     Macular degeneration     Polyarthropathy    [2]   Past Surgical History:  Procedure Laterality Date    APPENDECTOMY      CARDIAC CATHETERIZATION  1997    CATARACT EXTRACTION, BILATERAL  2022    CHOLECYSTECTOMY       COLECTOMY      DISCECTOMY SPINE CERVICAL ANTERIOR      HYSTERECTOMY W/ SALPINGO-OOPHERECTOMY      LUMBAR LAMINECTOMY      OTHER SURGICAL HISTORY      spur removal    OTHER SURGICAL HISTORY  05/06/2021    Trans sphenoid decompression of arachnoid cyst of the sella with subcutaneous fat packing    SKIN LESION EXCISION  04/25/2023    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    TONSILLECTOMY     [3]   Family History  Problem Relation Name Age of Onset    Coronary artery disease Mother      Colon cancer Father      Coronary artery disease Son Nilesh     No Known Problems Son Gomez    [4]   Social History  Tobacco Use    Smoking status: Never    Smokeless tobacco: Never   Vaping Use    Vaping status: Never Used   Substance Use Topics    Alcohol use: Yes     Comment: social    Drug use: Never        Jorden Morris DO  06/26/25 1517

## 2025-07-11 DIAGNOSIS — E78.2 MIXED HYPERLIPIDEMIA: ICD-10-CM

## 2025-07-11 RX ORDER — ROSUVASTATIN CALCIUM 10 MG/1
10 TABLET, COATED ORAL DAILY
Qty: 90 TABLET | Refills: 0 | Status: SHIPPED | OUTPATIENT
Start: 2025-07-11

## 2025-08-06 ENCOUNTER — CONSULT (OUTPATIENT)
Dept: PAIN MEDICINE | Facility: CLINIC | Age: 85
End: 2025-08-06
Attending: EMERGENCY MEDICINE
Payer: MEDICARE

## 2025-08-06 VITALS — HEIGHT: 63 IN | WEIGHT: 139 LBS | BODY MASS INDEX: 24.63 KG/M2

## 2025-08-06 DIAGNOSIS — M47.26 OTHER SPONDYLOSIS WITH RADICULOPATHY, LUMBAR REGION: Primary | ICD-10-CM

## 2025-08-06 DIAGNOSIS — G89.4 CHRONIC PAIN SYNDROME: ICD-10-CM

## 2025-08-06 DIAGNOSIS — M96.1 POSTLAMINECTOMY SYNDROME: ICD-10-CM

## 2025-08-06 DIAGNOSIS — M79.604 RIGHT LEG PAIN: ICD-10-CM

## 2025-08-06 DIAGNOSIS — N18.31 STAGE 3A CHRONIC KIDNEY DISEASE (HCC): ICD-10-CM

## 2025-08-06 PROCEDURE — G2211 COMPLEX E/M VISIT ADD ON: HCPCS | Performed by: ANESTHESIOLOGY

## 2025-08-06 PROCEDURE — 99204 OFFICE O/P NEW MOD 45 MIN: CPT | Performed by: ANESTHESIOLOGY

## 2025-08-06 RX ORDER — ACETAMINOPHEN 500 MG
500 TABLET ORAL EVERY 8 HOURS
COMMUNITY

## 2025-08-06 RX ORDER — ANTIOX #8/OM3/DHA/EPA/LUT/ZEAX 250-2.5 MG
CAPSULE ORAL
COMMUNITY

## 2025-08-06 RX ORDER — LOTILANER OPHTHALMIC SOLUTION 2.5 MG/ML
SOLUTION/ DROPS OPHTHALMIC
COMMUNITY

## 2025-08-06 RX ORDER — PREDNISONE 20 MG/1
20 TABLET ORAL DAILY
COMMUNITY
Start: 2025-06-27 | End: 2025-08-06

## 2025-08-06 RX ORDER — PERPHENAZINE 16 MG
TABLET ORAL
COMMUNITY